# Patient Record
Sex: FEMALE | Race: BLACK OR AFRICAN AMERICAN | NOT HISPANIC OR LATINO | Employment: UNEMPLOYED | URBAN - METROPOLITAN AREA
[De-identification: names, ages, dates, MRNs, and addresses within clinical notes are randomized per-mention and may not be internally consistent; named-entity substitution may affect disease eponyms.]

---

## 2017-02-14 ENCOUNTER — HOSPITAL ENCOUNTER (OUTPATIENT)
Dept: ULTRASOUND IMAGING | Facility: HOSPITAL | Age: 47
Discharge: HOME | End: 2017-02-14
Payer: COMMERCIAL

## 2017-02-14 DIAGNOSIS — E04.1 NONTOXIC SINGLE THYROID NODULE: ICD-10-CM

## 2017-02-14 PROCEDURE — 76536 US EXAM OF HEAD AND NECK: CPT

## 2017-02-15 ENCOUNTER — ALLSCRIPTS OFFICE VISIT (OUTPATIENT)
Dept: OTHER | Facility: OTHER | Age: 47
End: 2017-02-15

## 2017-02-15 LAB — HCG, QUALITATIVE (HISTORICAL): NEGATIVE

## 2017-05-15 ENCOUNTER — ALLSCRIPTS OFFICE VISIT (OUTPATIENT)
Dept: OTHER | Facility: OTHER | Age: 47
End: 2017-05-15

## 2017-05-16 ENCOUNTER — ALLSCRIPTS OFFICE VISIT (OUTPATIENT)
Dept: OTHER | Facility: OTHER | Age: 47
End: 2017-05-16

## 2017-05-16 DIAGNOSIS — R42 DIZZINESS AND GIDDINESS: ICD-10-CM

## 2017-05-16 DIAGNOSIS — M51.36 OTHER INTERVERTEBRAL DISC DEGENERATION, LUMBAR REGION: ICD-10-CM

## 2017-05-16 DIAGNOSIS — E04.1 NONTOXIC SINGLE THYROID NODULE: ICD-10-CM

## 2018-01-10 NOTE — MISCELLANEOUS
Provider Comments  Provider Comments:   pt no show for tubal consult      Signatures   Electronically signed by : Ceci Alexandra, ; Jan 20 2016  3:30PM EST                       (Author)    Electronically signed by :  GRIS Barnes; Jan 25 2016  1:55PM EST                       (Acknowledgement)    Electronically signed by : Wolfgang Copeland MD; Jan 25 2016  1:55PM EST

## 2018-01-13 VITALS — DIASTOLIC BLOOD PRESSURE: 84 MMHG | WEIGHT: 145 LBS | BODY MASS INDEX: 26.52 KG/M2 | SYSTOLIC BLOOD PRESSURE: 150 MMHG

## 2018-01-13 VITALS
BODY MASS INDEX: 28.52 KG/M2 | WEIGHT: 155 LBS | HEIGHT: 62 IN | DIASTOLIC BLOOD PRESSURE: 78 MMHG | SYSTOLIC BLOOD PRESSURE: 112 MMHG

## 2018-01-13 NOTE — MISCELLANEOUS
Provider Comments  Provider Comments:   pt no show for annual      Signatures   Electronically signed by : Bassam Hargrove, ; Jan 18 2016 11:27AM EST                       (Author)    Electronically signed by :  GRIS Sheridan; Jan 25 2016  1:46PM EST                       (Acknowledgement)

## 2018-01-15 VITALS
TEMPERATURE: 96.3 F | HEART RATE: 68 BPM | DIASTOLIC BLOOD PRESSURE: 72 MMHG | BODY MASS INDEX: 28.19 KG/M2 | WEIGHT: 153.19 LBS | SYSTOLIC BLOOD PRESSURE: 114 MMHG | OXYGEN SATURATION: 100 % | HEIGHT: 62 IN | RESPIRATION RATE: 18 BRPM

## 2020-03-12 ENCOUNTER — LAB (OUTPATIENT)
Dept: LAB | Facility: HOSPITAL | Age: 50
End: 2020-03-12
Payer: COMMERCIAL

## 2020-03-12 ENCOUNTER — OFFICE VISIT (OUTPATIENT)
Dept: FAMILY MEDICINE CLINIC | Facility: CLINIC | Age: 50
End: 2020-03-12

## 2020-03-12 VITALS
DIASTOLIC BLOOD PRESSURE: 96 MMHG | WEIGHT: 174 LBS | BODY MASS INDEX: 32.85 KG/M2 | TEMPERATURE: 98 F | OXYGEN SATURATION: 97 % | SYSTOLIC BLOOD PRESSURE: 136 MMHG | HEART RATE: 63 BPM | RESPIRATION RATE: 16 BRPM | HEIGHT: 61 IN

## 2020-03-12 DIAGNOSIS — Z12.11 COLON CANCER SCREENING: ICD-10-CM

## 2020-03-12 DIAGNOSIS — Z87.828 HISTORY OF TORN MENISCUS OF LEFT KNEE: ICD-10-CM

## 2020-03-12 DIAGNOSIS — I10 ESSENTIAL HYPERTENSION: ICD-10-CM

## 2020-03-12 DIAGNOSIS — Z12.31 BREAST CANCER SCREENING BY MAMMOGRAM: Primary | ICD-10-CM

## 2020-03-12 DIAGNOSIS — G89.29 CHRONIC BILATERAL THORACIC BACK PAIN: ICD-10-CM

## 2020-03-12 DIAGNOSIS — M54.6 CHRONIC BILATERAL THORACIC BACK PAIN: ICD-10-CM

## 2020-03-12 DIAGNOSIS — F31.70 BIPOLAR AFFECTIVE DISORDER IN REMISSION (HCC): ICD-10-CM

## 2020-03-12 DIAGNOSIS — G89.29 CHRONIC PAIN OF LEFT KNEE: ICD-10-CM

## 2020-03-12 DIAGNOSIS — M51.36 LUMBAR DEGENERATIVE DISC DISEASE: ICD-10-CM

## 2020-03-12 DIAGNOSIS — Z72.0 TOBACCO USE: ICD-10-CM

## 2020-03-12 DIAGNOSIS — E55.9 VITAMIN D DEFICIENCY: ICD-10-CM

## 2020-03-12 DIAGNOSIS — M54.50 CHRONIC LOW BACK PAIN WITHOUT SCIATICA, UNSPECIFIED BACK PAIN LATERALITY: ICD-10-CM

## 2020-03-12 DIAGNOSIS — G89.29 CHRONIC LOW BACK PAIN WITHOUT SCIATICA, UNSPECIFIED BACK PAIN LATERALITY: ICD-10-CM

## 2020-03-12 DIAGNOSIS — Z11.3 ROUTINE SCREENING FOR STI (SEXUALLY TRANSMITTED INFECTION): ICD-10-CM

## 2020-03-12 DIAGNOSIS — E66.09 CLASS 1 OBESITY DUE TO EXCESS CALORIES WITH SERIOUS COMORBIDITY AND BODY MASS INDEX (BMI) OF 32.0 TO 32.9 IN ADULT: ICD-10-CM

## 2020-03-12 DIAGNOSIS — M17.0 PRIMARY OSTEOARTHRITIS OF BOTH KNEES: ICD-10-CM

## 2020-03-12 DIAGNOSIS — M25.562 CHRONIC PAIN OF LEFT KNEE: ICD-10-CM

## 2020-03-12 PROBLEM — F32.A ANXIETY AND DEPRESSION: Status: ACTIVE | Noted: 2018-04-26

## 2020-03-12 PROBLEM — F41.9 ANXIETY AND DEPRESSION: Status: ACTIVE | Noted: 2018-04-26

## 2020-03-12 LAB
25(OH)D3 SERPL-MCNC: 14.2 NG/ML (ref 30–100)
ALBUMIN SERPL BCP-MCNC: 3.9 G/DL (ref 3–5.2)
ALP SERPL-CCNC: 97 U/L (ref 43–122)
ALT SERPL W P-5'-P-CCNC: 11 U/L (ref 9–52)
ANION GAP SERPL CALCULATED.3IONS-SCNC: 4 MMOL/L (ref 5–14)
AST SERPL W P-5'-P-CCNC: 20 U/L (ref 14–36)
BASOPHILS # BLD AUTO: 0.1 THOUSANDS/ΜL (ref 0–0.1)
BASOPHILS NFR BLD AUTO: 3 % (ref 0–1)
BILIRUB SERPL-MCNC: 0.2 MG/DL
BUN SERPL-MCNC: 16 MG/DL (ref 5–25)
CALCIUM SERPL-MCNC: 9.2 MG/DL (ref 8.4–10.2)
CHLORIDE SERPL-SCNC: 108 MMOL/L (ref 97–108)
CO2 SERPL-SCNC: 27 MMOL/L (ref 22–30)
CREAT SERPL-MCNC: 0.95 MG/DL (ref 0.6–1.2)
EOSINOPHIL # BLD AUTO: 0.2 THOUSAND/ΜL (ref 0–0.4)
EOSINOPHIL NFR BLD AUTO: 4 % (ref 0–6)
ERYTHROCYTE [DISTWIDTH] IN BLOOD BY AUTOMATED COUNT: 15.3 %
GFR SERPL CREATININE-BSD FRML MDRD: 80 ML/MIN/1.73SQ M
GLUCOSE P FAST SERPL-MCNC: 83 MG/DL (ref 70–99)
HCT VFR BLD AUTO: 39.1 % (ref 36–46)
HCV AB SER QL: NORMAL
HGB BLD-MCNC: 13 G/DL (ref 12–16)
LYMPHOCYTES # BLD AUTO: 3.2 THOUSANDS/ΜL (ref 0.5–4)
LYMPHOCYTES NFR BLD AUTO: 55 % (ref 25–45)
MCH RBC QN AUTO: 29.7 PG (ref 26–34)
MCHC RBC AUTO-ENTMCNC: 33.2 G/DL (ref 31–36)
MCV RBC AUTO: 90 FL (ref 80–100)
MONOCYTES # BLD AUTO: 0.3 THOUSAND/ΜL (ref 0.2–0.9)
MONOCYTES NFR BLD AUTO: 6 % (ref 1–10)
NEUTROPHILS # BLD AUTO: 1.9 THOUSANDS/ΜL (ref 1.8–7.8)
NEUTS SEG NFR BLD AUTO: 33 % (ref 45–65)
PLATELET # BLD AUTO: 299 THOUSANDS/UL (ref 150–450)
PMV BLD AUTO: 9.1 FL (ref 8.9–12.7)
POTASSIUM SERPL-SCNC: 4.1 MMOL/L (ref 3.6–5)
PROT SERPL-MCNC: 7.3 G/DL (ref 5.9–8.4)
RBC # BLD AUTO: 4.36 MILLION/UL (ref 4–5.2)
SODIUM SERPL-SCNC: 139 MMOL/L (ref 137–147)
TSH SERPL DL<=0.05 MIU/L-ACNC: 1.65 UIU/ML (ref 0.47–4.68)
WBC # BLD AUTO: 5.9 THOUSAND/UL (ref 4.5–11)

## 2020-03-12 PROCEDURE — 85025 COMPLETE CBC W/AUTO DIFF WBC: CPT

## 2020-03-12 PROCEDURE — 82306 VITAMIN D 25 HYDROXY: CPT

## 2020-03-12 PROCEDURE — 3075F SYST BP GE 130 - 139MM HG: CPT | Performed by: PHYSICIAN ASSISTANT

## 2020-03-12 PROCEDURE — 84443 ASSAY THYROID STIM HORMONE: CPT

## 2020-03-12 PROCEDURE — 86803 HEPATITIS C AB TEST: CPT

## 2020-03-12 PROCEDURE — 4004F PT TOBACCO SCREEN RCVD TLK: CPT | Performed by: PHYSICIAN ASSISTANT

## 2020-03-12 PROCEDURE — 99214 OFFICE O/P EST MOD 30 MIN: CPT | Performed by: PHYSICIAN ASSISTANT

## 2020-03-12 PROCEDURE — 87389 HIV-1 AG W/HIV-1&-2 AB AG IA: CPT

## 2020-03-12 PROCEDURE — 80053 COMPREHEN METABOLIC PANEL: CPT

## 2020-03-12 PROCEDURE — 36415 COLL VENOUS BLD VENIPUNCTURE: CPT

## 2020-03-12 PROCEDURE — 3080F DIAST BP >= 90 MM HG: CPT | Performed by: PHYSICIAN ASSISTANT

## 2020-03-12 RX ORDER — NICOTINE 21 MG/24HR
1 PATCH, TRANSDERMAL 24 HOURS TRANSDERMAL EVERY 24 HOURS
Qty: 28 PATCH | Refills: 2 | Status: SHIPPED | OUTPATIENT
Start: 2020-03-12 | End: 2022-07-15 | Stop reason: ALTCHOICE

## 2020-03-12 RX ORDER — CLONIDINE 0.1 MG/24H
PATCH, EXTENDED RELEASE TRANSDERMAL
COMMUNITY
End: 2020-03-12 | Stop reason: SDUPTHER

## 2020-03-12 RX ORDER — CLONIDINE 0.1 MG/24H
1 PATCH, EXTENDED RELEASE TRANSDERMAL WEEKLY
Qty: 4 PATCH | Refills: 5 | Status: SHIPPED | OUTPATIENT
Start: 2020-03-12 | End: 2020-08-31

## 2020-03-12 RX ORDER — NICOTINE 21 MG/24HR
PATCH, TRANSDERMAL 24 HOURS TRANSDERMAL
COMMUNITY
Start: 2017-05-16 | End: 2020-03-12

## 2020-03-12 RX ORDER — PAROXETINE 10 MG/1
1 TABLET, FILM COATED ORAL DAILY
COMMUNITY
Start: 2017-02-15 | End: 2020-03-12

## 2020-03-12 RX ORDER — GABAPENTIN 600 MG/1
1200 TABLET ORAL 2 TIMES DAILY
COMMUNITY
Start: 2020-02-15 | End: 2021-04-02 | Stop reason: SDUPTHER

## 2020-03-12 RX ORDER — CYCLOBENZAPRINE HCL 10 MG
10 TABLET ORAL 3 TIMES DAILY PRN
Qty: 90 TABLET | Refills: 1 | Status: SHIPPED | OUTPATIENT
Start: 2020-03-12 | End: 2020-10-12

## 2020-03-12 RX ORDER — CYCLOBENZAPRINE HCL 10 MG
TABLET ORAL
COMMUNITY
Start: 2019-02-12 | End: 2020-03-12 | Stop reason: SDUPTHER

## 2020-03-12 NOTE — PROGRESS NOTES
Assessment/Plan:    B[facility] disorder ([facility] )  Continue seeing psych    HTN (hypertension)  Restart clonidine patch  She just stopped it a few days ago    Lumbar degenerative disc disease  She will continue flexeril and she needs to restart PT  Hs she never finished it in the past    Class 1 obesity due to excess calories with serious comorbidity and body mass index (BMI) of 32 0 to 32 9 in adult  BMI Counseling: Body mass index is 32 88 kg/m²  The BMI is above normal  Nutrition recommendations include reducing intake of saturated fat and trans fat and reducing intake of cholesterol  Tobacco use  Tobacco Cessation Counseling: Tobacco cessation counseling and education was provided  The patient is sincerely urged to quit consumption of tobacco  She is ready to quit tobacco  The numerous health risks of tobacco consumption were discussed  Prescribed the following medications: nicotine patch  Return in about 2 months (around 5/12/2020) for back   There are no Patient Instructions on file for this visit  Problem List Items Addressed This Visit        Cardiovascular and Mediastinum    HTN (hypertension)     Restart clonidine patch  She just stopped it a few days ago         Relevant Medications    cloNIDine (CATAPRES-TTS-1) 0 1 mg/24 hr    Other Relevant Orders    Comprehensive metabolic panel (Completed)       Musculoskeletal and Integument    Lumbar degenerative disc disease     She will continue flexeril and she needs to restart PT   Hs she never finished it in the past            Other    Bipolar disorder ([facility] )     Continue seeing psych         Relevant Medications    Lurasidone HCl (Latuda) 60 MG TABS    nicotine polacrilex (NICORETTE) 4 mg gum    nicotine (NICODERM CQ) 21 mg/24 hr TD 24 hr patch    Other Relevant Orders    CBC and differential (Completed)    TSH, 3rd generation (Completed)    Class 1 obesity due to excess calories with serious comorbidity and body mass index (BMI) of 32 0 to 32 9 in adult     BMI Counseling: Body mass index is 32 88 kg/m²  The BMI is above normal  Nutrition recommendations include reducing intake of saturated fat and trans fat and reducing intake of cholesterol  Tobacco use     Tobacco Cessation Counseling: Tobacco cessation counseling and education was provided  The patient is sincerely urged to quit consumption of tobacco  She is ready to quit tobacco  The numerous health risks of tobacco consumption were discussed  Prescribed the following medications: nicotine patch  Relevant Medications    nicotine (NICODERM CQ) 21 mg/24 hr TD 24 hr patch      Other Visit Diagnoses     Breast cancer screening by mammogram    -  Primary    Relevant Orders    Mammo screening bilateral w cad    Colon cancer screening        Relevant Orders    Ambulatory referral to Gastroenterology    Chronic pain of left knee        Relevant Orders    Ambulatory referral to Orthopedic Surgery    History of torn meniscus of left knee        Relevant Orders    Ambulatory referral to Orthopedic Surgery    Primary osteoarthritis of both knees        Relevant Medications    diclofenac sodium (VOLTAREN) 1 %    Chronic bilateral thoracic back pain        Relevant Medications    cyclobenzaprine (FLEXERIL) 10 mg tablet    Other Relevant Orders    Ambulatory referral to Physical Therapy    Chronic low back pain without sciatica, unspecified back pain laterality        Relevant Medications    cyclobenzaprine (FLEXERIL) 10 mg tablet    Other Relevant Orders    Ambulatory referral to Physical Therapy    Routine screening for STI (sexually transmitted infection)        Relevant Orders    Human Immunodeficiency Virus 1/2 Antigen / Antibody ( Fourth Generation) with Reflex Testing    Hepatitis C antibody (Completed)    Vitamin D deficiency        Relevant Orders    Comprehensive metabolic panel (Completed)    Vitamin D 25 hydroxy (Completed)            Subjective:     Bartolo Andrade is a 44 y o  female who  has a past medical history of Anxiety and PTSD (post-traumatic stress disorder)  who presented to the office today for follow up visit  Patient is transferring from [facility]  She had torn meniscus in left knee s/p repair in January 2019  She is still ahving a lot of pain  Her psychiatry referred her to get gel injections  She is not able to exercise to lose weigt because of this  Sometimes the left knee gives out  It does swell at times  she also still continues to get pain in her back  She is not having this for years  She did start doing physical therapy but then when she was found have a torn meniscus stop that  She was on Flexeril, gabapentin and tramadol on the past   The tramadol helped most   She has this for some time however  Pain is in lumbar and middle thoracic spine  It does not typically radiate  No bowel or bladder dysfunction  She is seeing psychiatry  She is seeing [facility]  She is doing well and is more motivated to get her life together  She wants to lose weight and quit smoking  She is doing gum and 14mcg patch   She wants to start 21mg patch again  The generic patch is always getting irritated on her arm and falling off  She does get pain in the feet  It is mostly on left foot  No tingling in the feet  No polyuria or polydipsia  She was told to get checked for diabetes  The following portions of the patient's history were reviewed and updated as appropriate:   She  has a past medical history of Anxiety and PTSD (post-traumatic stress disorder)    She   Patient Active Problem List    Diagnosis Date Noted    Class 1 obesity due to excess calories with serious comorbidity and body mass index (BMI) of 32 0 to 32 9 in adult 03/13/2020    Tobacco use 03/13/2020    HTN (hypertension) 02/12/2019    Anxiety and depression 04/26/2018    Low serum vitamin D 10/04/2015    B[facility] ([facility] ) 10/02/2015    Gastroesophageal reflux disease without esophagitis 10/02/2015    Lumbar degenerative disc disease 10/02/2015    Migraines 10/02/2015     She  has no past surgical history on file  Her family history is not on file  She  reports that she has been smoking  She has been smoking about 0 25 packs per day  She has never used smokeless tobacco  She reports that she has current or past drug history  Drug: Marijuana  She reports that she does not drink alcohol  Current Outpatient Medications   Medication Sig Dispense Refill    ALPRAZolam (XANAX) 1 mg tablet ALPRAZolam 1 MG Oral Tablet  Quantity: 120;  Refills: 0   , M D ;  Started 25-June-2015 Active      cloNIDine (CATAPRES-TTS-1) 0 1 mg/24 hr Place 1 patch (0 1 mg total) on the skin once a week 4 patch 5    cyclobenzaprine (FLEXERIL) 10 mg tablet Take 1 tablet (10 mg total) by mouth 3 (three) times a day as needed for muscle spasms 90 tablet 1    gabapentin (NEURONTIN) 600 MG tablet Take 1,200 mg by mouth 2 (two) times a day      nicotine polacrilex (NICORETTE) 4 mg gum       OMEPRAZOLE PO Omeprazole 20 MG Oral Capsule Delayed Release take 1 capsule by mouth once daily  Quantity: 30;  Refills: 1    Ewa Stevens PAC;  Started 2-Oct-2015 Active      traZODone (DESYREL) 100 mg tablet TraZODone HCl - 100 MG Oral Tablet  Quantity: 30;  Refills: 0   , M D ;  Started 17-Apr-2015 Active      diclofenac sodium (VOLTAREN) 1 % Apply 2 g topically 4 (four) times a day 100 g 5    Lurasidone HCl (Latuda) 60 MG TABS Latuda 60 mg tablet      nicotine (NICODERM CQ) 21 mg/24 hr TD 24 hr patch Place 1 patch on the skin every 24 hours 28 patch 2     No current facility-administered medications for this visit        Current Outpatient Medications on File Prior to Visit   Medication Sig    ALPRAZolam (XANAX) 1 mg tablet ALPRAZolam 1 MG Oral Tablet  Quantity: 120;  Refills: 0   , M D ;  Started 25-June-2015 Active    gabapentin (NEURONTIN) 600 MG tablet Take 1,200 mg by mouth 2 (two) times a day    nicotine polacrilex (NICORETTE) 4 mg gum     OMEPRAZOLE PO Omeprazole 20 MG Oral Capsule Delayed Release take 1 capsule by mouth once daily  Quantity: 30;  Refills: 1    Ewa Stevens PAC;  Started 2-Oct-2015 Active    traZODone (DESYREL) 100 mg tablet TraZODone HCl - 100 MG Oral Tablet  Quantity: 30;  Refills: 0   , M D ;  Started 17-Apr-2015 Active    Lurasidone HCl (Latuda) 60 MG TABS Latuda 60 mg tablet     No current facility-administered medications on file prior to visit  She has No Known Allergies       Current Outpatient Medications on File Prior to Visit   Medication Sig Dispense Refill    ALPRAZolam (XANAX) 1 mg tablet ALPRAZolam 1 MG Oral Tablet  Quantity: 120;  Refills: 0   , M D ;  Started 25-June-2015 Active      gabapentin (NEURONTIN) 600 MG tablet Take 1,200 mg by mouth 2 (two) times a day      nicotine polacrilex (NICORETTE) 4 mg gum       OMEPRAZOLE PO Omeprazole 20 MG Oral Capsule Delayed Release take 1 capsule by mouth once daily  Quantity: 30;  Refills: 1    VenancioarabellaEwa PAC;  Started 2-Oct-2015 Active      traZODone (DESYREL) 100 mg tablet TraZODone HCl - 100 MG Oral Tablet  Quantity: 30;  Refills: 0   , M D ;  Started 17-Apr-2015 Active      Lurasidone HCl (Latuda) 60 MG TABS Latuda 60 mg tablet       No current facility-administered medications on file prior to visit  Review of Systems   Constitutional: Negative for activity change, appetite change, chills, fatigue and unexpected weight change  HENT: Negative for dental problem, ear pain, hearing loss and sore throat  Eyes: Negative for visual disturbance  Respiratory: Negative for cough and wheezing  Cardiovascular: Negative for chest pain  Gastrointestinal: Negative for abdominal pain, constipation, diarrhea and vomiting  Genitourinary: Negative for difficulty urinating and dysuria  Musculoskeletal: Positive for arthralgias and back pain  Negative for myalgias  Skin: Negative for rash     Neurological: Negative for dizziness and headaches  Psychiatric/Behavioral: Negative for behavioral problems and dysphoric mood  The patient is not nervous/anxious  Objective:    /96 (BP Location: Left arm, Patient Position: Sitting, Cuff Size: Large)   Pulse 63   Temp 98 °F (36 7 °C) (Temporal)   Resp 16   Ht 5' 1" (1 549 m)   Wt 78 9 kg (174 lb)   SpO2 97%   BMI 32 88 kg/m²     Physical Exam   Constitutional: She is oriented to person, place, and time  She appears well-developed and well-nourished  No distress  HENT:   Head: Normocephalic and atraumatic  Right Ear: External ear normal    Left Ear: External ear normal    Eyes: Conjunctivae are normal    Neck: Normal range of motion  Neck supple  No thyromegaly present  Cardiovascular: Normal rate, regular rhythm and normal heart sounds  No murmur heard  Pulmonary/Chest: Effort normal and breath sounds normal  No respiratory distress  She has no wheezes  Abdominal: Soft  Bowel sounds are normal  She exhibits no distension and no mass  There is no tenderness  There is no rebound and no guarding  Musculoskeletal: She exhibits tenderness (lumbar L3-5 and T3-6)  Tender left medial knee and small amount of swelling   Lymphadenopathy:     She has no cervical adenopathy  Neurological: She is alert and oriented to person, place, and time  Psychiatric: She has a normal mood and affect  Her behavior is normal    Nursing note and vitals reviewed        Ewa Stevens PA-C  03/13/20  3:27 PM

## 2020-03-13 PROBLEM — E66.09 CLASS 1 OBESITY DUE TO EXCESS CALORIES WITH SERIOUS COMORBIDITY AND BODY MASS INDEX (BMI) OF 32.0 TO 32.9 IN ADULT: Status: ACTIVE | Noted: 2020-03-13

## 2020-03-13 PROBLEM — E66.811 CLASS 1 OBESITY DUE TO EXCESS CALORIES WITH SERIOUS COMORBIDITY AND BODY MASS INDEX (BMI) OF 32.0 TO 32.9 IN ADULT: Status: ACTIVE | Noted: 2020-03-13

## 2020-03-13 PROBLEM — Z72.0 TOBACCO USE: Status: ACTIVE | Noted: 2020-03-13

## 2020-03-13 LAB — HIV 1+2 AB+HIV1 P24 AG SERPL QL IA: NORMAL

## 2020-03-13 NOTE — ASSESSMENT & PLAN NOTE
BMI Counseling: Body mass index is 32 88 kg/m²  The BMI is above normal  Nutrition recommendations include reducing intake of saturated fat and trans fat and reducing intake of cholesterol

## 2020-03-17 DIAGNOSIS — E55.9 VITAMIN D DEFICIENCY: Primary | ICD-10-CM

## 2020-03-17 RX ORDER — ERGOCALCIFEROL 1.25 MG/1
50000 CAPSULE ORAL WEEKLY
Qty: 4 CAPSULE | Refills: 2 | Status: SHIPPED | OUTPATIENT
Start: 2020-03-17 | End: 2020-05-15

## 2020-03-18 ENCOUNTER — TELEPHONE (OUTPATIENT)
Dept: FAMILY MEDICINE CLINIC | Facility: CLINIC | Age: 50
End: 2020-03-18

## 2020-03-18 DIAGNOSIS — J30.9 ALLERGIC RHINITIS, UNSPECIFIED SEASONALITY, UNSPECIFIED TRIGGER: Primary | ICD-10-CM

## 2020-03-18 RX ORDER — FLUTICASONE PROPIONATE 50 MCG
2 SPRAY, SUSPENSION (ML) NASAL DAILY
Qty: 1 BOTTLE | Refills: 3 | Status: SHIPPED | OUTPATIENT
Start: 2020-03-18 | End: 2021-11-15

## 2020-03-25 ENCOUNTER — APPOINTMENT (OUTPATIENT)
Dept: RADIOLOGY | Facility: MEDICAL CENTER | Age: 50
End: 2020-03-25
Payer: COMMERCIAL

## 2020-03-25 ENCOUNTER — OFFICE VISIT (OUTPATIENT)
Dept: OBGYN CLINIC | Facility: MEDICAL CENTER | Age: 50
End: 2020-03-25
Payer: COMMERCIAL

## 2020-03-25 VITALS — TEMPERATURE: 97.8 F | WEIGHT: 174 LBS | HEIGHT: 61 IN | BODY MASS INDEX: 32.85 KG/M2

## 2020-03-25 DIAGNOSIS — M25.561 ACUTE PAIN OF BOTH KNEES: ICD-10-CM

## 2020-03-25 DIAGNOSIS — M17.12 PRIMARY OSTEOARTHRITIS OF LEFT KNEE: Primary | ICD-10-CM

## 2020-03-25 DIAGNOSIS — M25.562 ACUTE PAIN OF BOTH KNEES: ICD-10-CM

## 2020-03-25 DIAGNOSIS — M17.0 PRIMARY OSTEOARTHRITIS OF BOTH KNEES: ICD-10-CM

## 2020-03-25 PROCEDURE — 99243 OFF/OP CNSLTJ NEW/EST LOW 30: CPT | Performed by: ORTHOPAEDIC SURGERY

## 2020-03-25 PROCEDURE — 73564 X-RAY EXAM KNEE 4 OR MORE: CPT

## 2020-03-25 NOTE — PATIENT INSTRUCTIONS
Plan :  1  I explained the natural history of the  problem to the  patient - there is no surgical intervention necessary and should improve with nonoperative treatment  2  Activity modification - avoid hyperflexion like bending, kneeling, squatting,  stairs as  much as humanly possible  Change position frequently to straighten your leg if you are sitting for a long period of time  3  Pain should be the warning guide to your activities - both during and after exercises  Stop doing your sport or activity if you are getting significant pain  4  Ice in large trash bag or bag of frozen peas for 15 min 4x a day, if needed, for pain or swelling  Heat is not indicated  5  Take Advil, Aleve, or Tylenol on an as-needed basis for pain  6  Do the isometric quad and hamstring exercises that you were shown diligently at  home  7  Gliding activities (skiing machine, elliptical, swimming) are allowed, but do not do pounding activities  ( treadmill, aerobics, distance walking )  8  A cane in the right hand will help decrease some of the pain in the left knee   9  We will try to obtain preauthorization for viscosupplementation to the more symptomatic left knee

## 2020-03-25 NOTE — PROGRESS NOTES
Chief Complaint   Patient presents with    Left Knee - Pain           Assessment:  DJD of knees bilateral-left greater than right    Plan :  1  I explained the natural history of the  problem to the  patient - there is no surgical intervention necessary and should improve with nonoperative treatment  2  Activity modification - avoid hyperflexion like bending, kneeling, squatting,  stairs as  much as humanly possible  Change position frequently to straighten your leg if you are sitting for a long period of time  3  Pain should be the warning guide to your activities - both during and after exercises  Stop doing your sport or activity if you are getting significant pain  4  Ice in large trash bag or bag of frozen peas for 15 min 4x a day, if needed, for pain or swelling  Heat is not indicated  5  Take Advil, Aleve, or Tylenol on an as-needed basis for pain  6  Do the isometric quad and hamstring exercises that you were shown diligently at  home  7  Gliding activities (skiing machine, elliptical, swimming) are allowed, but do not do pounding activities  ( treadmill, aerobics, distance walking )  8  A cane in the right hand will help decrease some of the pain in the left knee   9  We will try to obtain preauthorization for viscosupplementation to the more symptomatic left knee  HPI:   Patient is a 64-year-old [place] American female who presents today with chief complaint of bilateral knee pain, left more significant than right  She comes for 2nd opinion and on consultation from her family doctor  On today's presentation she complains of anterior and medial knee pain that is exacerbated by damp weather, going up and down stairs, kneeling, squatting, and prolonged weight-bearing activity  She reports intermittent swelling, but denies any bruising, numbness, tingling, or mechanical symptoms    She also complains of a occasional feelings of instability, and she states that her left knee has given out on her in the past   She admits a previous surgical history that includes arthroscopic meniscectomy performed in the left knee on 1/15/2019  Following that procedure, she did have formal dry land physical therapy which she said did not help her symptoms  She had a course of aquatic therapy, with which she says she did feel improvement  She has also had cortisone injections which were not helpful, and discussed viscosupplementation injections with her previous doctor  Unfortunately, before moving forward with gel shots, she had a disagreement with her surgeon and no longer wishes to be treated under his practice  PMHx:         Past Medical History:   Diagnosis Date    Anxiety     PTSD (post-traumatic stress disorder)        History reviewed  No pertinent surgical history  History reviewed  No pertinent family history      Social History     Socioeconomic History    Marital status: Single     Spouse name: Not on file    Number of children: Not on file    Years of education: Not on file    Highest education level: Not on file   Occupational History    Not on file   Social Needs    Financial resource strain: Not on file    Food insecurity:     Worry: Not on file     Inability: Not on file    Transportation needs:     Medical: Not on file     Non-medical: Not on file   Tobacco Use    Smoking status: Light Tobacco Smoker     Packs/day: 0 25    Smokeless tobacco: Never Used   Substance and Sexual Activity    Alcohol use: No    Drug use: Yes     Types: Marijuana    Sexual activity: Not on file   Lifestyle    Physical activity:     Days per week: Not on file     Minutes per session: Not on file    Stress: Not on file   Relationships    Social connections:     Talks on phone: Not on file     Gets together: Not on file     Attends Buddhist service: Not on file     Active member of club or organization: Not on file     Attends meetings of clubs or organizations: Not on file     Relationship status: Not on file    Intimate partner violence:     Fear of current or ex partner: Not on file     Emotionally abused: Not on file     Physically abused: Not on file     Forced sexual activity: Not on file   Other Topics Concern    Not on file   Social History Narrative    Not on file       Current Outpatient Medications   Medication Sig Dispense Refill    ALPRAZolam (XANAX) 1 mg tablet ALPRAZolam 1 MG Oral Tablet  Quantity: 120;  Refills: 0   , M D ;  Started 25-June-2015 Active      cloNIDine (CATAPRES-TTS-1) 0 1 mg/24 hr Place 1 patch (0 1 mg total) on the skin once a week 4 patch 5    cyclobenzaprine (FLEXERIL) 10 mg tablet Take 1 tablet (10 mg total) by mouth 3 (three) times a day as needed for muscle spasms 90 tablet 1    diclofenac sodium (VOLTAREN) 1 % Apply 2 g topically 4 (four) times a day 100 g 5    ergocalciferol (VITAMIN D2) 50,000 units Take 1 capsule (50,000 Units total) by mouth once a week 4 capsule 2    fluticasone (FLONASE) 50 mcg/act nasal spray 2 sprays into each nostril daily 1 Bottle 3    gabapentin (NEURONTIN) 600 MG tablet Take 1,200 mg by mouth 2 (two) times a day      Lurasidone HCl (Latuda) 60 MG TABS Latuda 60 mg tablet      nicotine (NICODERM CQ) 21 mg/24 hr TD 24 hr patch Place 1 patch on the skin every 24 hours 28 patch 2    nicotine polacrilex (NICORETTE) 4 mg gum       OMEPRAZOLE PO Omeprazole 20 MG Oral Capsule Delayed Release take 1 capsule by mouth once daily  Quantity: 30;  Refills: 1    Ewa Stevens PAC;  Started 2-Oct-2015 Active      traZODone (DESYREL) 100 mg tablet TraZODone HCl - 100 MG Oral Tablet  Quantity: 30;  Refills: 0   , M D ;  Started 17-Apr-2015 Active       No current facility-administered medications for this visit  Allergies: Patient has no known allergies  ROS:  Positive for musculoskeletal complaints as noted above  The remaining 11/12 systems on the intake sheet that I reviewed were negative      PE:  Temp 97 8 °F (36 6 °C)   Ht 5' 1" (1 549 m)   Wt 78 9 kg (174 lb)   BMI 32 88 kg/m²   Constitutional: The patient was  oriented to person, place, and time  Well-developed and well-nourished  In no acute distress  HEENT: Vision intact  Hearing normal  Swallowing normal   Head: Normocephalic  Cardiovascular: Intact distal pulses  Pulse regular  Pulmonary/Chest: Effort normal  No respiratory distress  Neurological: Alert and oriented to person, place, and time  Skin: Skin is warm  Psychiatric: Normal mood and affect  Ortho Exam:    Patient ambulates with mild observable limp favoring the right lower extremity  Physical examination of the bilateral knees show mild varus deformity in both weight-bearing and nonweightbearing postures  Skin is warm and dry to touch with no signs of erythema, ecchymosis, or infection  The left knee has well-healed surgical scars consistent with previous medical history of arthroscopic meniscectomy  She demonstrates active knee ROM 5°-100° on the left lower extremity as compared to 0°-120° on the right lower extremity  She has tenderness to palpation over the medial joint line and pes anserine bursae bilaterally  Both knees are stable to varus, valgus, anterior, and posterior stress  There is no popliteal adenopathy noted bilaterally  Negative calf squeeze bilaterally  2+ TP pulse  Sensation light touch intact distally  Studies reviewed:  I personally reviewed weight-bearing x-rays of both knees  She has medial compartment narrowing much more severe in the left knee than the right with sclerosis and some flattening  She has some early osteophyte formation of the patellofemoral views with preservation of the joint space in the anterior compartment  The lateral compartment is also preserved    There were no loose bodies or lytic areas seen    Scribe Attestation    I,:   Jazmine Woo am acting as a scribe while in the presence of the attending physician :        I,:   Luis Carlos Dale MD personally performed the services described in this documentation    as scribed in my presence :

## 2020-03-25 NOTE — LETTER
March 25, 2020     Ewa Stevens PA-C  [address]  [address] [facility]    Patient: Bayron Will   YOB: 1970   Date of Visit: 3/25/2020       Dear Ms Stevens: Thank you for referring Bayron Will to me for evaluation  Below are my notes for this consultation  If you have questions, please do not hesitate to call me  I look forward to following your patient along with you  Sincerely,        Cayla Vazquez MD        CC: No Recipients  Cayla Vazquez MD  3/25/2020 12:33 PM  Sign at close encounter  Chief Complaint   Patient presents with    Left Knee - Pain           Assessment:  DJD of knees bilateral-left greater than right    Plan :  1  I explained the natural history of the  problem to the  patient - there is no surgical intervention necessary and should improve with nonoperative treatment  2  Activity modification - avoid hyperflexion like bending, kneeling, squatting,  stairs as  much as humanly possible  Change position frequently to straighten your leg if you are sitting for a long period of time  3  Pain should be the warning guide to your activities - both during and after exercises  Stop doing your sport or activity if you are getting significant pain  4  Ice in large trash bag or bag of frozen peas for 15 min 4x a day, if needed, for pain or swelling  Heat is not indicated  5  Take Advil, Aleve, or Tylenol on an as-needed basis for pain  6  Do the isometric quad and hamstring exercises that you were shown diligently at  home  7  Gliding activities (skiing machine, elliptical, swimming) are allowed, but do not do pounding activities  ( treadmill, aerobics, distance walking )  8  A cane in the right hand will help decrease some of the pain in the left knee   9  We will try to obtain preauthorization for viscosupplementation to the more symptomatic left knee      HPI:   Patient is a 66-year-old [place] American female who presents today with chief complaint of bilateral knee pain, left more significant than right  She comes for 2nd opinion and on consultation from her family doctor  On today's presentation she complains of anterior and medial knee pain that is exacerbated by damp weather, going up and down stairs, kneeling, squatting, and prolonged weight-bearing activity  She reports intermittent swelling, but denies any bruising, numbness, tingling, or mechanical symptoms  She also complains of a occasional feelings of instability, and she states that her left knee has given out on her in the past   She admits a previous surgical history that includes arthroscopic meniscectomy performed in the left knee on 1/15/2019  Following that procedure, she did have formal dry land physical therapy which she said did not help her symptoms  She had a course of aquatic therapy, with which she says she did feel improvement  She has also had cortisone injections which were not helpful, and discussed viscosupplementation injections with her previous doctor  Unfortunately, before moving forward with gel shots, she had a disagreement with her surgeon and no longer wishes to be treated under his practice  PMHx:         Past Medical History:   Diagnosis Date    Anxiety     PTSD (post-traumatic stress disorder)        History reviewed  No pertinent surgical history  History reviewed  No pertinent family history      Social History     Socioeconomic History    Marital status: Single     Spouse name: Not on file    Number of children: Not on file    Years of education: Not on file    Highest education level: Not on file   Occupational History    Not on file   Social Needs    Financial resource strain: Not on file    Food insecurity:     Worry: Not on file     Inability: Not on file    Transportation needs:     Medical: Not on file     Non-medical: Not on file   Tobacco Use    Smoking status: Light Tobacco Smoker     Packs/day: 0 25    Smokeless tobacco: Never Used Substance and Sexual Activity    Alcohol use: No    Drug use: Yes     Types: Marijuana    Sexual activity: Not on file   Lifestyle    Physical activity:     Days per week: Not on file     Minutes per session: Not on file    Stress: Not on file   Relationships    Social connections:     Talks on phone: Not on file     Gets together: Not on file     Attends Buddhism service: Not on file     Active member of club or organization: Not on file     Attends meetings of clubs or organizations: Not on file     Relationship status: Not on file    Intimate partner violence:     Fear of current or ex partner: Not on file     Emotionally abused: Not on file     Physically abused: Not on file     Forced sexual activity: Not on file   Other Topics Concern    Not on file   Social History Narrative    Not on file       Current Outpatient Medications   Medication Sig Dispense Refill    ALPRAZolam (XANAX) 1 mg tablet ALPRAZolam 1 MG Oral Tablet  Quantity: 120;  Refills: 0   , M D ;  Started 25-June-2015 Active      cloNIDine (CATAPRES-TTS-1) 0 1 mg/24 hr Place 1 patch (0 1 mg total) on the skin once a week 4 patch 5    cyclobenzaprine (FLEXERIL) 10 mg tablet Take 1 tablet (10 mg total) by mouth 3 (three) times a day as needed for muscle spasms 90 tablet 1    diclofenac sodium (VOLTAREN) 1 % Apply 2 g topically 4 (four) times a day 100 g 5    ergocalciferol (VITAMIN D2) 50,000 units Take 1 capsule (50,000 Units total) by mouth once a week 4 capsule 2    fluticasone (FLONASE) 50 mcg/act nasal spray 2 sprays into each nostril daily 1 Bottle 3    gabapentin (NEURONTIN) 600 MG tablet Take 1,200 mg by mouth 2 (two) times a day      Lurasidone HCl (Latuda) 60 MG TABS Latuda 60 mg tablet      nicotine (NICODERM CQ) 21 mg/24 hr TD 24 hr patch Place 1 patch on the skin every 24 hours 28 patch 2    nicotine polacrilex (NICORETTE) 4 mg gum       OMEPRAZOLE PO Omeprazole 20 MG Oral Capsule Delayed Release take 1 capsule by mouth once daily  Quantity: 30;  Refills: 1    Ewa Stevens PAC;  Started 2-Oct-2015 Active      traZODone (DESYREL) 100 mg tablet TraZODone HCl - 100 MG Oral Tablet  Quantity: 30;  Refills: 0   , M D ;  Started 17-Apr-2015 Active       No current facility-administered medications for this visit  Allergies: Patient has no known allergies  ROS:  Positive for musculoskeletal complaints as noted above  The remaining 11/12 systems on the intake sheet that I reviewed were negative  PE:  Temp 97 8 °F (36 6 °C)   Ht 5' 1" (1 549 m)   Wt 78 9 kg (174 lb)   BMI 32 88 kg/m²    Constitutional: The patient was  oriented to person, place, and time  Well-developed and well-nourished  In no acute distress  HEENT: Vision intact  Hearing normal  Swallowing normal   Head: Normocephalic  Cardiovascular: Intact distal pulses  Pulse regular  Pulmonary/Chest: Effort normal  No respiratory distress  Neurological: Alert and oriented to person, place, and time  Skin: Skin is warm  Psychiatric: Normal mood and affect  Ortho Exam:    Patient ambulates with mild observable limp favoring the right lower extremity  Physical examination of the bilateral knees show mild varus deformity in both weight-bearing and nonweightbearing postures  Skin is warm and dry to touch with no signs of erythema, ecchymosis, or infection  The left knee has well-healed surgical scars consistent with previous medical history of arthroscopic meniscectomy  She demonstrates active knee ROM 5°-100° on the left lower extremity as compared to 0°-120° on the right lower extremity  She has tenderness to palpation over the medial joint line and pes anserine bursae bilaterally  Both knees are stable to varus, valgus, anterior, and posterior stress  There is no popliteal adenopathy noted bilaterally  Negative calf squeeze bilaterally  2+ TP pulse  Sensation light touch intact distally      Studies reviewed:  I personally reviewed weight-bearing x-rays of both knees  She has medial compartment narrowing much more severe in the left knee than the right with sclerosis and some flattening  She has some early osteophyte formation of the patellofemoral views with preservation of the joint space in the anterior compartment  The lateral compartment is also preserved    There were no loose bodies or lytic areas seen    Scribe Attestation    I,:   Gillian Prakash am acting as a scribe while in the presence of the attending physician :        I,:   Minor Montero MD personally performed the services described in this documentation    as scribed in my presence :

## 2020-04-14 ENCOUNTER — TELEPHONE (OUTPATIENT)
Dept: FAMILY MEDICINE CLINIC | Facility: CLINIC | Age: 50
End: 2020-04-14

## 2020-04-14 DIAGNOSIS — M51.36 LUMBAR DEGENERATIVE DISC DISEASE: ICD-10-CM

## 2020-04-14 DIAGNOSIS — K21.9 GASTROESOPHAGEAL REFLUX DISEASE WITHOUT ESOPHAGITIS: Primary | ICD-10-CM

## 2020-04-14 RX ORDER — CELECOXIB 200 MG/1
200 CAPSULE ORAL DAILY
Qty: 30 CAPSULE | Refills: 2 | Status: SHIPPED | OUTPATIENT
Start: 2020-04-14 | End: 2020-05-27 | Stop reason: SDUPTHER

## 2020-04-14 RX ORDER — OMEPRAZOLE 20 MG/1
20 CAPSULE, DELAYED RELEASE ORAL DAILY
Qty: 90 CAPSULE | Refills: 1 | Status: SHIPPED | OUTPATIENT
Start: 2020-04-14 | End: 2020-05-27 | Stop reason: SDUPTHER

## 2020-04-20 ENCOUNTER — EVALUATION (OUTPATIENT)
Dept: PHYSICAL THERAPY | Facility: MEDICAL CENTER | Age: 50
End: 2020-04-20
Payer: COMMERCIAL

## 2020-04-20 DIAGNOSIS — M54.50 LUMBAR PAIN: ICD-10-CM

## 2020-04-20 DIAGNOSIS — G89.29 CHRONIC BILATERAL THORACIC BACK PAIN: Primary | ICD-10-CM

## 2020-04-20 DIAGNOSIS — M54.6 CHRONIC BILATERAL THORACIC BACK PAIN: Primary | ICD-10-CM

## 2020-04-20 PROCEDURE — 97161 PT EVAL LOW COMPLEX 20 MIN: CPT | Performed by: PHYSICAL THERAPIST

## 2020-04-27 ENCOUNTER — OFFICE VISIT (OUTPATIENT)
Dept: PHYSICAL THERAPY | Facility: MEDICAL CENTER | Age: 50
End: 2020-04-27
Payer: COMMERCIAL

## 2020-04-27 ENCOUNTER — TELEPHONE (OUTPATIENT)
Dept: PAIN MEDICINE | Facility: MEDICAL CENTER | Age: 50
End: 2020-04-27

## 2020-04-27 DIAGNOSIS — M54.50 LUMBAR PAIN: ICD-10-CM

## 2020-04-27 DIAGNOSIS — M54.6 CHRONIC BILATERAL THORACIC BACK PAIN: Primary | ICD-10-CM

## 2020-04-27 DIAGNOSIS — G89.29 CHRONIC BILATERAL THORACIC BACK PAIN: Primary | ICD-10-CM

## 2020-04-27 PROCEDURE — 97112 NEUROMUSCULAR REEDUCATION: CPT | Performed by: PHYSICAL THERAPIST

## 2020-04-27 PROCEDURE — 97140 MANUAL THERAPY 1/> REGIONS: CPT | Performed by: PHYSICAL THERAPIST

## 2020-04-27 PROCEDURE — 97110 THERAPEUTIC EXERCISES: CPT | Performed by: PHYSICAL THERAPIST

## 2020-05-04 ENCOUNTER — OFFICE VISIT (OUTPATIENT)
Dept: PHYSICAL THERAPY | Facility: MEDICAL CENTER | Age: 50
End: 2020-05-04
Payer: COMMERCIAL

## 2020-05-04 DIAGNOSIS — G89.29 CHRONIC BILATERAL THORACIC BACK PAIN: Primary | ICD-10-CM

## 2020-05-04 DIAGNOSIS — M54.50 LUMBAR PAIN: ICD-10-CM

## 2020-05-04 DIAGNOSIS — M54.6 CHRONIC BILATERAL THORACIC BACK PAIN: Primary | ICD-10-CM

## 2020-05-04 PROCEDURE — 97112 NEUROMUSCULAR REEDUCATION: CPT | Performed by: PHYSICAL THERAPIST

## 2020-05-04 PROCEDURE — 97110 THERAPEUTIC EXERCISES: CPT | Performed by: PHYSICAL THERAPIST

## 2020-05-04 PROCEDURE — 97140 MANUAL THERAPY 1/> REGIONS: CPT | Performed by: PHYSICAL THERAPIST

## 2020-05-11 ENCOUNTER — HOSPITAL ENCOUNTER (OUTPATIENT)
Dept: MAMMOGRAPHY | Facility: CLINIC | Age: 50
Discharge: HOME | End: 2020-05-11
Payer: COMMERCIAL

## 2020-05-11 ENCOUNTER — APPOINTMENT (OUTPATIENT)
Dept: PHYSICAL THERAPY | Facility: MEDICAL CENTER | Age: 50
End: 2020-05-11
Payer: COMMERCIAL

## 2020-05-11 VITALS — BODY MASS INDEX: 32.85 KG/M2 | HEIGHT: 61 IN | WEIGHT: 174 LBS

## 2020-05-11 DIAGNOSIS — Z12.31 BREAST CANCER SCREENING BY MAMMOGRAM: ICD-10-CM

## 2020-05-11 PROCEDURE — 77067 SCR MAMMO BI INCL CAD: CPT

## 2020-05-11 PROCEDURE — 77063 BREAST TOMOSYNTHESIS BI: CPT

## 2020-05-14 DIAGNOSIS — E55.9 VITAMIN D DEFICIENCY: ICD-10-CM

## 2020-05-15 RX ORDER — ERGOCALCIFEROL 1.25 MG/1
CAPSULE ORAL
Qty: 4 CAPSULE | Refills: 2 | Status: SHIPPED | OUTPATIENT
Start: 2020-05-15 | End: 2020-06-05

## 2020-05-18 ENCOUNTER — OFFICE VISIT (OUTPATIENT)
Dept: GASTROENTEROLOGY | Facility: MEDICAL CENTER | Age: 50
End: 2020-05-18
Payer: COMMERCIAL

## 2020-05-18 ENCOUNTER — TELEPHONE (OUTPATIENT)
Dept: OTHER | Facility: OTHER | Age: 50
End: 2020-05-18

## 2020-05-18 DIAGNOSIS — K21.9 GASTROESOPHAGEAL REFLUX DISEASE WITHOUT ESOPHAGITIS: Primary | ICD-10-CM

## 2020-05-18 DIAGNOSIS — Z12.11 COLON CANCER SCREENING: ICD-10-CM

## 2020-05-18 DIAGNOSIS — Z12.11 ENCOUNTER FOR SCREENING COLONOSCOPY: ICD-10-CM

## 2020-05-18 PROCEDURE — 99202 OFFICE O/P NEW SF 15 MIN: CPT | Performed by: PHYSICIAN ASSISTANT

## 2020-05-18 RX ORDER — POLYETHYLENE GLYCOL 3350 17 G/17G
POWDER, FOR SOLUTION ORAL
Qty: 238 G | Refills: 0 | Status: SHIPPED | OUTPATIENT
Start: 2020-05-18 | End: 2020-06-09 | Stop reason: ALTCHOICE

## 2020-05-26 ENCOUNTER — TELEPHONE (OUTPATIENT)
Dept: GASTROENTEROLOGY | Facility: MEDICAL CENTER | Age: 50
End: 2020-05-26

## 2020-05-27 ENCOUNTER — TELEMEDICINE (OUTPATIENT)
Dept: FAMILY MEDICINE CLINIC | Facility: CLINIC | Age: 50
End: 2020-05-27

## 2020-05-27 VITALS — BODY MASS INDEX: 33.99 KG/M2 | WEIGHT: 180 LBS | HEIGHT: 61 IN

## 2020-05-27 DIAGNOSIS — K21.9 GASTROESOPHAGEAL REFLUX DISEASE WITHOUT ESOPHAGITIS: ICD-10-CM

## 2020-05-27 DIAGNOSIS — M51.36 LUMBAR DEGENERATIVE DISC DISEASE: ICD-10-CM

## 2020-05-27 DIAGNOSIS — E66.09 CLASS 1 OBESITY DUE TO EXCESS CALORIES WITH SERIOUS COMORBIDITY AND BODY MASS INDEX (BMI) OF 32.0 TO 32.9 IN ADULT: Primary | ICD-10-CM

## 2020-05-27 DIAGNOSIS — I10 ESSENTIAL HYPERTENSION: ICD-10-CM

## 2020-05-27 PROCEDURE — 99214 OFFICE O/P EST MOD 30 MIN: CPT | Performed by: PHYSICIAN ASSISTANT

## 2020-05-27 RX ORDER — OMEPRAZOLE 20 MG/1
20 CAPSULE, DELAYED RELEASE ORAL DAILY
Qty: 90 CAPSULE | Refills: 1 | Status: SHIPPED | OUTPATIENT
Start: 2020-05-27 | End: 2021-11-15

## 2020-05-27 RX ORDER — CELECOXIB 200 MG/1
200 CAPSULE ORAL DAILY
Qty: 30 CAPSULE | Refills: 2 | Status: SHIPPED | OUTPATIENT
Start: 2020-05-27 | End: 2020-09-02

## 2020-05-29 ENCOUNTER — ANESTHESIA EVENT (OUTPATIENT)
Dept: GASTROENTEROLOGY | Facility: MEDICAL CENTER | Age: 50
End: 2020-05-29

## 2020-06-01 ENCOUNTER — OFFICE VISIT (OUTPATIENT)
Dept: PHYSICAL THERAPY | Facility: MEDICAL CENTER | Age: 50
End: 2020-06-01
Payer: COMMERCIAL

## 2020-06-01 DIAGNOSIS — M54.50 LUMBAR PAIN: ICD-10-CM

## 2020-06-01 DIAGNOSIS — G89.29 CHRONIC BILATERAL THORACIC BACK PAIN: Primary | ICD-10-CM

## 2020-06-01 DIAGNOSIS — M54.6 CHRONIC BILATERAL THORACIC BACK PAIN: Primary | ICD-10-CM

## 2020-06-01 PROCEDURE — 97140 MANUAL THERAPY 1/> REGIONS: CPT | Performed by: PHYSICAL THERAPIST

## 2020-06-02 ENCOUNTER — TELEPHONE (OUTPATIENT)
Dept: OBGYN CLINIC | Facility: CLINIC | Age: 50
End: 2020-06-02

## 2020-06-03 ENCOUNTER — OFFICE VISIT (OUTPATIENT)
Dept: OBGYN CLINIC | Facility: CLINIC | Age: 50
End: 2020-06-03

## 2020-06-03 VITALS
SYSTOLIC BLOOD PRESSURE: 120 MMHG | TEMPERATURE: 97 F | HEIGHT: 61 IN | BODY MASS INDEX: 32.47 KG/M2 | HEART RATE: 70 BPM | WEIGHT: 172 LBS | DIASTOLIC BLOOD PRESSURE: 80 MMHG

## 2020-06-03 DIAGNOSIS — Z01.419 ENCOUNTER FOR ANNUAL ROUTINE GYNECOLOGICAL EXAMINATION: Primary | ICD-10-CM

## 2020-06-03 DIAGNOSIS — Z12.11 ENCOUNTER FOR SCREENING COLONOSCOPY: ICD-10-CM

## 2020-06-03 PROCEDURE — 99396 PREV VISIT EST AGE 40-64: CPT | Performed by: NURSE PRACTITIONER

## 2020-06-03 PROCEDURE — U0003 INFECTIOUS AGENT DETECTION BY NUCLEIC ACID (DNA OR RNA); SEVERE ACUTE RESPIRATORY SYNDROME CORONAVIRUS 2 (SARS-COV-2) (CORONAVIRUS DISEASE [COVID-19]), AMPLIFIED PROBE TECHNIQUE, MAKING USE OF HIGH THROUGHPUT TECHNOLOGIES AS DESCRIBED BY CMS-2020-01-R: HCPCS

## 2020-06-04 DIAGNOSIS — E55.9 VITAMIN D DEFICIENCY: ICD-10-CM

## 2020-06-05 LAB — SARS-COV-2 RNA SPEC QL NAA+PROBE: NOT DETECTED

## 2020-06-05 RX ORDER — ERGOCALCIFEROL 1.25 MG/1
CAPSULE ORAL
Qty: 4 CAPSULE | Refills: 2 | Status: SHIPPED | OUTPATIENT
Start: 2020-06-05 | End: 2020-12-09

## 2020-06-08 ENCOUNTER — OFFICE VISIT (OUTPATIENT)
Dept: PHYSICAL THERAPY | Facility: MEDICAL CENTER | Age: 50
End: 2020-06-08
Payer: COMMERCIAL

## 2020-06-08 DIAGNOSIS — G89.29 CHRONIC BILATERAL THORACIC BACK PAIN: Primary | ICD-10-CM

## 2020-06-08 DIAGNOSIS — M54.50 LUMBAR PAIN: ICD-10-CM

## 2020-06-08 DIAGNOSIS — M54.6 CHRONIC BILATERAL THORACIC BACK PAIN: Primary | ICD-10-CM

## 2020-06-08 PROCEDURE — 97140 MANUAL THERAPY 1/> REGIONS: CPT | Performed by: PHYSICAL THERAPIST

## 2020-06-08 PROCEDURE — 97112 NEUROMUSCULAR REEDUCATION: CPT | Performed by: PHYSICAL THERAPIST

## 2020-06-08 PROCEDURE — 97110 THERAPEUTIC EXERCISES: CPT | Performed by: PHYSICAL THERAPIST

## 2020-06-09 ENCOUNTER — TELEPHONE (OUTPATIENT)
Dept: OBGYN CLINIC | Facility: MEDICAL CENTER | Age: 50
End: 2020-06-09

## 2020-06-09 ENCOUNTER — HOSPITAL ENCOUNTER (OUTPATIENT)
Dept: GASTROENTEROLOGY | Facility: MEDICAL CENTER | Age: 50
Setting detail: OUTPATIENT SURGERY
Discharge: HOME | End: 2020-06-09
Admitting: INTERNAL MEDICINE
Payer: COMMERCIAL

## 2020-06-09 ENCOUNTER — ANESTHESIA (OUTPATIENT)
Dept: GASTROENTEROLOGY | Facility: MEDICAL CENTER | Age: 50
End: 2020-06-09

## 2020-06-09 VITALS
DIASTOLIC BLOOD PRESSURE: 83 MMHG | SYSTOLIC BLOOD PRESSURE: 136 MMHG | BODY MASS INDEX: 32.47 KG/M2 | TEMPERATURE: 98.4 F | OXYGEN SATURATION: 98 % | HEIGHT: 61 IN | RESPIRATION RATE: 16 BRPM | WEIGHT: 172 LBS | HEART RATE: 73 BPM

## 2020-06-09 DIAGNOSIS — K21.9 GASTROESOPHAGEAL REFLUX DISEASE WITHOUT ESOPHAGITIS: ICD-10-CM

## 2020-06-09 DIAGNOSIS — Z12.11 ENCOUNTER FOR SCREENING COLONOSCOPY: ICD-10-CM

## 2020-06-09 PROCEDURE — 43239 EGD BIOPSY SINGLE/MULTIPLE: CPT | Performed by: INTERNAL MEDICINE

## 2020-06-09 PROCEDURE — 88305 TISSUE EXAM BY PATHOLOGIST: CPT | Performed by: PATHOLOGY

## 2020-06-09 PROCEDURE — 45380 COLONOSCOPY AND BIOPSY: CPT | Performed by: INTERNAL MEDICINE

## 2020-06-09 RX ORDER — SODIUM CHLORIDE 9 MG/ML
125 INJECTION, SOLUTION INTRAVENOUS CONTINUOUS
Status: DISCONTINUED | OUTPATIENT
Start: 2020-06-09 | End: 2020-06-13 | Stop reason: HOSPADM

## 2020-06-09 RX ORDER — PROPOFOL 10 MG/ML
INJECTION, EMULSION INTRAVENOUS AS NEEDED
Status: DISCONTINUED | OUTPATIENT
Start: 2020-06-09 | End: 2020-06-09 | Stop reason: SURG

## 2020-06-09 RX ADMIN — PROPOFOL 50 MG: 10 INJECTION, EMULSION INTRAVENOUS at 11:33

## 2020-06-09 RX ADMIN — PROPOFOL 120 MG: 10 INJECTION, EMULSION INTRAVENOUS at 11:24

## 2020-06-09 RX ADMIN — PROPOFOL 50 MG: 10 INJECTION, EMULSION INTRAVENOUS at 11:39

## 2020-06-09 RX ADMIN — PROPOFOL 50 MG: 10 INJECTION, EMULSION INTRAVENOUS at 11:30

## 2020-06-09 RX ADMIN — SODIUM CHLORIDE 125 ML/HR: 0.9 INJECTION, SOLUTION INTRAVENOUS at 11:07

## 2020-06-09 RX ADMIN — PROPOFOL 50 MG: 10 INJECTION, EMULSION INTRAVENOUS at 11:27

## 2020-06-09 RX ADMIN — PROPOFOL 50 MG: 10 INJECTION, EMULSION INTRAVENOUS at 11:36

## 2020-06-09 RX ADMIN — PROPOFOL 50 MG: 10 INJECTION, EMULSION INTRAVENOUS at 11:25

## 2020-06-11 ENCOUNTER — TELEPHONE (OUTPATIENT)
Dept: GASTROENTEROLOGY | Facility: MEDICAL CENTER | Age: 50
End: 2020-06-11

## 2020-06-11 ENCOUNTER — TELEPHONE (OUTPATIENT)
Dept: OBGYN CLINIC | Facility: MEDICAL CENTER | Age: 50
End: 2020-06-11

## 2020-06-11 DIAGNOSIS — A04.8 H. PYLORI INFECTION: Primary | ICD-10-CM

## 2020-06-11 RX ORDER — TETRACYCLINE HYDROCHLORIDE 500 MG/1
500 CAPSULE ORAL 4 TIMES DAILY
Qty: 56 CAPSULE | Refills: 0 | Status: SHIPPED | OUTPATIENT
Start: 2020-06-11 | End: 2020-06-25

## 2020-06-11 RX ORDER — METRONIDAZOLE 500 MG/1
500 TABLET ORAL EVERY 8 HOURS SCHEDULED
Qty: 42 TABLET | Refills: 0 | Status: SHIPPED | OUTPATIENT
Start: 2020-06-11 | End: 2020-06-25

## 2020-06-11 RX ORDER — BISMUTH SUBSALICYLATE 262 MG/1
262 TABLET, CHEWABLE ORAL
Qty: 56 TABLET | Refills: 0 | Status: SHIPPED | OUTPATIENT
Start: 2020-06-11 | End: 2020-10-12

## 2020-06-11 RX ORDER — OMEPRAZOLE 20 MG/1
20 CAPSULE, DELAYED RELEASE ORAL 2 TIMES DAILY
Qty: 28 CAPSULE | Refills: 0 | Status: SHIPPED | OUTPATIENT
Start: 2020-06-11 | End: 2021-11-15

## 2020-06-15 ENCOUNTER — OFFICE VISIT (OUTPATIENT)
Dept: PHYSICAL THERAPY | Facility: MEDICAL CENTER | Age: 50
End: 2020-06-15
Payer: COMMERCIAL

## 2020-06-15 DIAGNOSIS — M54.6 CHRONIC BILATERAL THORACIC BACK PAIN: Primary | ICD-10-CM

## 2020-06-15 DIAGNOSIS — M54.50 LUMBAR PAIN: ICD-10-CM

## 2020-06-15 DIAGNOSIS — G89.29 CHRONIC BILATERAL THORACIC BACK PAIN: Primary | ICD-10-CM

## 2020-06-15 PROCEDURE — 97012 MECHANICAL TRACTION THERAPY: CPT | Performed by: PHYSICAL THERAPIST

## 2020-06-15 PROCEDURE — 97112 NEUROMUSCULAR REEDUCATION: CPT | Performed by: PHYSICAL THERAPIST

## 2020-06-15 PROCEDURE — 97140 MANUAL THERAPY 1/> REGIONS: CPT | Performed by: PHYSICAL THERAPIST

## 2020-06-15 PROCEDURE — 97110 THERAPEUTIC EXERCISES: CPT | Performed by: PHYSICAL THERAPIST

## 2020-06-16 ENCOUNTER — TELEPHONE (OUTPATIENT)
Dept: OBGYN CLINIC | Facility: MEDICAL CENTER | Age: 50
End: 2020-06-16

## 2020-06-17 ENCOUNTER — OFFICE VISIT (OUTPATIENT)
Dept: OBGYN CLINIC | Facility: MEDICAL CENTER | Age: 50
End: 2020-06-17
Payer: COMMERCIAL

## 2020-06-17 ENCOUNTER — DOCUMENTATION (OUTPATIENT)
Dept: GASTROENTEROLOGY | Facility: MEDICAL CENTER | Age: 50
End: 2020-06-17

## 2020-06-17 VITALS — BODY MASS INDEX: 32.47 KG/M2 | WEIGHT: 172 LBS | HEIGHT: 61 IN | TEMPERATURE: 97.7 F

## 2020-06-17 DIAGNOSIS — M54.50 CHRONIC RIGHT-SIDED LOW BACK PAIN, UNSPECIFIED WHETHER SCIATICA PRESENT: ICD-10-CM

## 2020-06-17 DIAGNOSIS — G89.29 CHRONIC RIGHT-SIDED LOW BACK PAIN, UNSPECIFIED WHETHER SCIATICA PRESENT: ICD-10-CM

## 2020-06-17 DIAGNOSIS — M17.12 PRIMARY OSTEOARTHRITIS OF LEFT KNEE: Primary | ICD-10-CM

## 2020-06-17 PROCEDURE — 99213 OFFICE O/P EST LOW 20 MIN: CPT | Performed by: ORTHOPAEDIC SURGERY

## 2020-06-17 PROCEDURE — 20610 DRAIN/INJ JOINT/BURSA W/O US: CPT | Performed by: ORTHOPAEDIC SURGERY

## 2020-06-17 RX ORDER — HYALURONATE SODIUM 10 MG/ML
20 SYRINGE (ML) INTRAARTICULAR
Status: COMPLETED | OUTPATIENT
Start: 2020-06-17 | End: 2020-06-17

## 2020-06-17 RX ADMIN — Medication 20 MG: at 14:19

## 2020-06-18 ENCOUNTER — TELEPHONE (OUTPATIENT)
Dept: GASTROENTEROLOGY | Facility: MEDICAL CENTER | Age: 50
End: 2020-06-18

## 2020-06-22 ENCOUNTER — OFFICE VISIT (OUTPATIENT)
Dept: PHYSICAL THERAPY | Facility: MEDICAL CENTER | Age: 50
End: 2020-06-22
Payer: COMMERCIAL

## 2020-06-22 DIAGNOSIS — G89.29 CHRONIC BILATERAL THORACIC BACK PAIN: Primary | ICD-10-CM

## 2020-06-22 DIAGNOSIS — M54.6 CHRONIC BILATERAL THORACIC BACK PAIN: Primary | ICD-10-CM

## 2020-06-22 DIAGNOSIS — M54.50 LUMBAR PAIN: ICD-10-CM

## 2020-06-22 PROCEDURE — 97140 MANUAL THERAPY 1/> REGIONS: CPT | Performed by: PHYSICAL THERAPIST

## 2020-06-22 PROCEDURE — 97112 NEUROMUSCULAR REEDUCATION: CPT | Performed by: PHYSICAL THERAPIST

## 2020-06-22 PROCEDURE — 97110 THERAPEUTIC EXERCISES: CPT | Performed by: PHYSICAL THERAPIST

## 2020-06-24 ENCOUNTER — OFFICE VISIT (OUTPATIENT)
Dept: OBGYN CLINIC | Facility: MEDICAL CENTER | Age: 50
End: 2020-06-24
Payer: COMMERCIAL

## 2020-06-24 VITALS — HEIGHT: 61 IN | TEMPERATURE: 97.3 F | BODY MASS INDEX: 32.47 KG/M2 | WEIGHT: 172 LBS

## 2020-06-24 DIAGNOSIS — M17.0 PRIMARY OSTEOARTHRITIS OF BOTH KNEES: Primary | ICD-10-CM

## 2020-06-24 PROCEDURE — 20610 DRAIN/INJ JOINT/BURSA W/O US: CPT | Performed by: ORTHOPAEDIC SURGERY

## 2020-06-29 ENCOUNTER — APPOINTMENT (OUTPATIENT)
Dept: PHYSICAL THERAPY | Facility: MEDICAL CENTER | Age: 50
End: 2020-06-29
Payer: COMMERCIAL

## 2020-07-01 ENCOUNTER — APPOINTMENT (OUTPATIENT)
Dept: PHYSICAL THERAPY | Facility: MEDICAL CENTER | Age: 50
End: 2020-07-01
Payer: COMMERCIAL

## 2020-07-02 PROBLEM — E66.09 CLASS 1 OBESITY DUE TO EXCESS CALORIES WITH SERIOUS COMORBIDITY AND BODY MASS INDEX (BMI) OF 32.0 TO 32.9 IN ADULT: Status: RESOLVED | Noted: 2020-03-13 | Resolved: 2020-07-02

## 2020-07-02 PROBLEM — E66.9 OBESITY (BMI 30.0-34.9): Status: ACTIVE | Noted: 2020-03-13

## 2020-07-02 PROBLEM — E66.811 CLASS 1 OBESITY DUE TO EXCESS CALORIES WITH SERIOUS COMORBIDITY AND BODY MASS INDEX (BMI) OF 32.0 TO 32.9 IN ADULT: Status: RESOLVED | Noted: 2020-03-13 | Resolved: 2020-07-02

## 2020-07-10 ENCOUNTER — OFFICE VISIT (OUTPATIENT)
Dept: OBGYN CLINIC | Facility: MEDICAL CENTER | Age: 50
End: 2020-07-10
Payer: COMMERCIAL

## 2020-07-10 VITALS
WEIGHT: 172.8 LBS | BODY MASS INDEX: 32.62 KG/M2 | DIASTOLIC BLOOD PRESSURE: 88 MMHG | HEIGHT: 61 IN | SYSTOLIC BLOOD PRESSURE: 145 MMHG | TEMPERATURE: 97.4 F | HEART RATE: 62 BPM

## 2020-07-10 DIAGNOSIS — M17.0 PRIMARY OSTEOARTHRITIS OF BOTH KNEES: Primary | ICD-10-CM

## 2020-07-10 DIAGNOSIS — M25.562 ACUTE PAIN OF BOTH KNEES: ICD-10-CM

## 2020-07-10 DIAGNOSIS — M25.561 ACUTE PAIN OF BOTH KNEES: ICD-10-CM

## 2020-07-10 PROCEDURE — 20610 DRAIN/INJ JOINT/BURSA W/O US: CPT | Performed by: EMERGENCY MEDICINE

## 2020-07-10 RX ORDER — HYALURONATE SODIUM 10 MG/ML
20 SYRINGE (ML) INTRAARTICULAR
Status: COMPLETED | OUTPATIENT
Start: 2020-07-10 | End: 2020-07-10

## 2020-07-10 RX ADMIN — Medication 20 MG: at 15:26

## 2020-07-10 NOTE — PROGRESS NOTES
Assessment/Plan:    Diagnoses and all orders for this visit:    Primary osteoarthritis of both knees  -     Large joint arthrocentesis: L knee    Acute pain of both knees  -     Large joint arthrocentesis: L knee        Return for 4-6 weeks for steroid injection left knee  Chief Complaint:     Chief Complaint   Patient presents with    Left Knee - Follow-up       Subjective:   Patient ID: Nehal Salgado is a 44 y o  female  Patient presents for left knee Euflexxa 3/3  She notes significant improvement in the past with steroid injection      Review of Systems    The following portions of the patient's chart were reviewed and updated as appropriate:    Allergy:  No Known Allergies      Past Medical History:   Diagnosis Date    Anxiety     Class 1 obesity due to excess calories with serious comorbidity and body mass index (BMI) of 32 0 to 32 9 in adult 3/13/2020    Depression     Hypertension     PTSD (post-traumatic stress disorder)        Past Surgical History:   Procedure Laterality Date    FACIAL FRACTURE SURGERY      TENDON REPAIR         Social History     Socioeconomic History    Marital status: Single     Spouse name: Not on file    Number of children: Not on file    Years of education: Not on file    Highest education level: Not on file   Occupational History    Not on file   Social Needs    Financial resource strain: Not on file    Food insecurity:     Worry: Not on file     Inability: Not on file    Transportation needs:     Medical: Not on file     Non-medical: Not on file   Tobacco Use    Smoking status: Light Tobacco Smoker     Packs/day: 0 25    Smokeless tobacco: Never Used   Substance and Sexual Activity    Alcohol use: Yes     Comment: social    Drug use: Yes     Types: Marijuana    Sexual activity: Yes     Partners: Male     Birth control/protection: None   Lifestyle    Physical activity:     Days per week: Not on file     Minutes per session: Not on file    Stress: Not on file   Relationships    Social connections:     Talks on phone: Not on file     Gets together: Not on file     Attends Zoroastrian service: Not on file     Active member of club or organization: Not on file     Attends meetings of clubs or organizations: Not on file     Relationship status: Not on file    Intimate partner violence:     Fear of current or ex partner: Not on file     Emotionally abused: Not on file     Physically abused: Not on file     Forced sexual activity: Not on file   Other Topics Concern    Not on file   Social History Narrative    Not on file       Family History   Problem Relation Age of Onset    No Known Problems Mother     No Known Problems Father     No Known Problems Sister     No Known Problems Daughter     Lung cancer Maternal Grandmother     No Known Problems Maternal Grandfather     No Known Problems Paternal Grandmother     No Known Problems Paternal Grandfather        Medications:    Current Outpatient Medications:     ALPRAZolam (XANAX) 1 mg tablet, ALPRAZolam 1 MG Oral Tablet  Quantity: 120;  Refills: 0   , M D ;  Started 25-June-2015 Active, Disp: , Rfl:     bismuth subsalicylate (PEPTO BISMOL) 262 MG chewable tablet, Chew 1 tablet (262 mg total) 4 (four) times a day (before meals and at bedtime), Disp: 56 tablet, Rfl: 0    celecoxib (CeleBREX) 200 mg capsule, Take 1 capsule (200 mg total) by mouth daily, Disp: 30 capsule, Rfl: 2    cloNIDine (CATAPRES-TTS-1) 0 1 mg/24 hr, Place 1 patch (0 1 mg total) on the skin once a week, Disp: 4 patch, Rfl: 5    cyclobenzaprine (FLEXERIL) 10 mg tablet, Take 1 tablet (10 mg total) by mouth 3 (three) times a day as needed for muscle spasms, Disp: 90 tablet, Rfl: 1    diclofenac sodium (VOLTAREN) 1 %, Apply 2 g topically 4 (four) times a day, Disp: 100 g, Rfl: 5    ergocalciferol (VITAMIN D2) 50,000 units, take 1 capsule by mouth every week, Disp: 4 capsule, Rfl: 2    fluticasone (FLONASE) 50 mcg/act nasal spray, 2 sprays into each nostril daily, Disp: 1 Bottle, Rfl: 3    gabapentin (NEURONTIN) 600 MG tablet, Take 1,200 mg by mouth 2 (two) times a day, Disp: , Rfl:     Lurasidone HCl (Latuda) 60 MG TABS, Latuda 60 mg tablet, Disp: , Rfl:     nicotine (NICODERM CQ) 21 mg/24 hr TD 24 hr patch, Place 1 patch on the skin every 24 hours, Disp: 28 patch, Rfl: 2    nicotine polacrilex (NICORETTE) 4 mg gum, , Disp: , Rfl:     omeprazole (PriLOSEC) 20 mg delayed release capsule, Take 1 capsule (20 mg total) by mouth daily Omeprazole 20 MG Oral Capsule Delayed Release take 1 capsule by mouth once daily  Quantity: 30;  Refills: 1    Ewa Stevens PAC;  Started 2-Oct-2015 Active, Disp: 90 capsule, Rfl: 1    omeprazole (PriLOSEC) 20 mg delayed release capsule, Take 1 capsule (20 mg total) by mouth 2 (two) times a day, Disp: 28 capsule, Rfl: 0    traZODone (DESYREL) 100 mg tablet, TraZODone HCl - 100 MG Oral Tablet  Quantity: 30;  Refills: 0   , M D ;  Started 17-Apr-2015 Active, Disp: , Rfl:     Patient Active Problem List   Diagnosis    Anxiety and depression    [facility]    Gastroesophageal reflux disease without esophagitis    HTN (hypertension)    Low serum vitamin D    Lumbar degenerative disc disease    Migraines    Obesity (BMI 30 0-34  9)    Tobacco use    Primary osteoarthritis of left knee    Encounter for screening colonoscopy       Objective:  /88 (BP Location: Left arm, Patient Position: Sitting, Cuff Size: Standard)   Pulse 62   Temp (!) 97 4 °F (36 3 °C)   Ht 5' 1" (1 549 m)   Wt 78 4 kg (172 lb 12 8 oz)   LMP 05/10/2016   BMI 32 65 kg/m²     Left Knee Exam     Other   Erythema: absent            Physical Exam      Neurologic Exam    Large joint arthrocentesis: L knee  Date/Time: 7/10/2020 3:26 PM  Consent given by: patient  Site marked: site marked  Timeout: Immediately prior to procedure a time out was called to verify the correct patient, procedure, equipment, support staff and site/side marked as required   Supporting Documentation  Indications: pain   Procedure Details  Location: knee - L knee  Preparation: Patient was prepped and draped in the usual sterile fashion  Needle size: 22 G  Ultrasound guidance: no  Approach: anterolateral  Medications administered: 20 mg Sodium Hyaluronate 20 MG/2ML    Patient tolerance: patient tolerated the procedure well with no immediate complications  Dressing:  Sterile dressing applied    Euflexxa #3          I have personally reviewed the written report of the pertinent studies

## 2020-07-13 ENCOUNTER — OFFICE VISIT (OUTPATIENT)
Dept: PHYSICAL THERAPY | Facility: MEDICAL CENTER | Age: 50
End: 2020-07-13
Payer: COMMERCIAL

## 2020-07-13 DIAGNOSIS — M54.6 CHRONIC BILATERAL THORACIC BACK PAIN: Primary | ICD-10-CM

## 2020-07-13 DIAGNOSIS — G89.29 CHRONIC BILATERAL THORACIC BACK PAIN: Primary | ICD-10-CM

## 2020-07-13 DIAGNOSIS — M54.50 LUMBAR PAIN: ICD-10-CM

## 2020-07-13 PROCEDURE — 97112 NEUROMUSCULAR REEDUCATION: CPT | Performed by: PHYSICAL THERAPIST

## 2020-07-13 PROCEDURE — 97110 THERAPEUTIC EXERCISES: CPT | Performed by: PHYSICAL THERAPIST

## 2020-07-13 PROCEDURE — 97140 MANUAL THERAPY 1/> REGIONS: CPT | Performed by: PHYSICAL THERAPIST

## 2020-07-15 ENCOUNTER — TELEPHONE (OUTPATIENT)
Dept: OBGYN CLINIC | Facility: MEDICAL CENTER | Age: 50
End: 2020-07-15

## 2020-07-15 ENCOUNTER — OFFICE VISIT (OUTPATIENT)
Dept: PHYSICAL THERAPY | Facility: MEDICAL CENTER | Age: 50
End: 2020-07-15
Payer: COMMERCIAL

## 2020-07-15 DIAGNOSIS — M25.562 ACUTE PAIN OF BOTH KNEES: ICD-10-CM

## 2020-07-15 DIAGNOSIS — M54.6 CHRONIC BILATERAL THORACIC BACK PAIN: Primary | ICD-10-CM

## 2020-07-15 DIAGNOSIS — G89.29 CHRONIC BILATERAL THORACIC BACK PAIN: Primary | ICD-10-CM

## 2020-07-15 DIAGNOSIS — M54.50 LUMBAR PAIN: ICD-10-CM

## 2020-07-15 DIAGNOSIS — M17.0 PRIMARY OSTEOARTHRITIS OF BOTH KNEES: Primary | ICD-10-CM

## 2020-07-15 DIAGNOSIS — M25.561 ACUTE PAIN OF BOTH KNEES: ICD-10-CM

## 2020-07-15 PROCEDURE — 97112 NEUROMUSCULAR REEDUCATION: CPT | Performed by: PHYSICAL THERAPIST

## 2020-07-15 PROCEDURE — 97110 THERAPEUTIC EXERCISES: CPT | Performed by: PHYSICAL THERAPIST

## 2020-07-15 PROCEDURE — 97140 MANUAL THERAPY 1/> REGIONS: CPT | Performed by: PHYSICAL THERAPIST

## 2020-07-15 NOTE — TELEPHONE ENCOUNTER
Patient came in to check on records, no records have been received  Patient will call for them      Dr Celia Tyler- patient is requesting PT for her legs stated she is having some pain and feels PT would help   Would need referral

## 2020-07-15 NOTE — PROGRESS NOTES
Daily Note     Today's date: 7/15/2020  Patient name: Frantz Anguiano  : 1970  MRN: [id number]  Referring provider: Brooklyn Hernandes PA-C  Dx:   Encounter Diagnosis     ICD-10-CM    1  Chronic bilateral thoracic back pain M54 6     G89 29    2  Lumbar pain M54 5                   Subjective: Nola Lesch reports that her neck has been feeling very good, low back feels stiff today       Objective: See treatment diary below      Assessment: Tolerated treatment well  Patient with reduced low back and mid back stiffness reported today  Able to increase scapular exercises without increase or spasm in back  Progress sa able      Plan: Continue per plan of care        Precautions: None      Manuals 4/27 5/4 6/1 6/8 6/15 6/22 7/13 7/15     Supine mid thoracic PA thrust AF AF    AF AF AF     UT release   AF AF AF  AF AF     SOR   AF AF AF  AF AF     Seated CTJ thrust    AF AF        Neuro Re-Ed             Bridges 2X10 3X10    3X10 purple 3X10   purple   3X10     Supine clamshells Green  3X10 Green  3X10    Purple  3X10 purple  3X10 purple  3x10     Scapular retractions Pink  3X10 Pink  3X10  Green  3X10 Green  3X10 [id number] green 3X10 green  3X10     Mod plank with hip abduciton      2X 10       TB extension        Blue  3x10                               Ther Ex             LTR 10 sec  X10 10 secX  10  10 sec  X10  10 sec  X 10 10 sec   X 10  10 sec   X10 10 sec  X 10      Sidelying lumbar rotation stretch 10 sec  X10 10 sec  X10           Thoracic rotation 30 30  30 30 30 30 30     Hip adduction  5 sec  X20           Thoracic extension    10 sec  X 10 10 sec   X10  10 sec   10 10 sec  X 10 10 sec  X x10                                             Ther Activity                                       Gait Training                                       Modalities             traction     65#/ 30#  10 min  70#  35#  10 min

## 2020-07-24 ENCOUNTER — OFFICE VISIT (OUTPATIENT)
Dept: PHYSICAL THERAPY | Facility: MEDICAL CENTER | Age: 50
End: 2020-07-24
Payer: COMMERCIAL

## 2020-07-24 DIAGNOSIS — M54.6 CHRONIC BILATERAL THORACIC BACK PAIN: Primary | ICD-10-CM

## 2020-07-24 DIAGNOSIS — G89.29 CHRONIC BILATERAL THORACIC BACK PAIN: Primary | ICD-10-CM

## 2020-07-24 PROCEDURE — 97012 MECHANICAL TRACTION THERAPY: CPT | Performed by: PHYSICAL THERAPIST

## 2020-07-24 PROCEDURE — 97110 THERAPEUTIC EXERCISES: CPT | Performed by: PHYSICAL THERAPIST

## 2020-07-24 PROCEDURE — 97140 MANUAL THERAPY 1/> REGIONS: CPT | Performed by: PHYSICAL THERAPIST

## 2020-07-24 PROCEDURE — 97010 HOT OR COLD PACKS THERAPY: CPT | Performed by: PHYSICAL THERAPIST

## 2020-07-24 NOTE — PROGRESS NOTES
Daily Note     Today's date: 2020  Patient name: Kendrick Kaminski  : 1970  MRN: [id number]  Referring provider: Paul Armendariz PA-C  Dx:   Encounter Diagnosis     ICD-10-CM    1  Chronic bilateral thoracic back pain M54 6     G89 29                   Subjective: Pt reports having mild middle and low back pain today  Objective: See treatment diary below      Assessment: Tolerated treatment well  Patient would benefit from continued PT  Pt exhibits good lumbar mobility and mild hypomobility in cervical and thoracic spine  Pt gets relief from manual therapy and traction  Continue to progress strengthening and mobility as tolerated  Plan: Continue per plan of care        Precautions: None      Manuals 4/27 5/4 6/1 6/8 6/15 6/22 7/13 7/15 7/24    Supine mid thoracic PA thrust AF AF    AF AF AF     UT release   AF AF AF  AF AF CK    SOR   AF AF AF  AF AF CK    Seated CTJ thrust    AF AF    CK    Neuro Re-Ed             Bridges 2X10 3X10    3X10 purple 3X10   purple   3X10 3x10    Supine clamshells Green  3X10 Green  3X10    Purple  3X10 purple  3X10 purple  3x10 purple 3x10    Scapular retractions Pink  3X10 Pink  3X10  Green  3X10 Green  3X10 [id number] green 3X10 green  3X10     Mod plank with hip abduciton      2X 10       TB extension        Blue  3x10                               Ther Ex             LTR 10 sec  X10 10 secX  10  10 sec  X10  10 sec  X 10 10 sec   X 10  10 sec   X10 10 sec  X 10  10x10    Sidelying lumbar rotation stretch 10 sec  X10 10 sec  X10           Thoracic rotation 30 30  30 30 30 30 30 30    Hip adduction  5 sec  X20           Thoracic extension    10 sec  X 10 10 sec   X10  10 sec   10 10 sec  X 10 10 sec  X x10                                             Ther Activity                                       Gait Training                                       Modalities             traction     65#/ 30#  10 min  70#  35#  10 min    70/30, 15min

## 2020-07-27 ENCOUNTER — APPOINTMENT (OUTPATIENT)
Dept: PHYSICAL THERAPY | Facility: MEDICAL CENTER | Age: 50
End: 2020-07-27
Payer: COMMERCIAL

## 2020-08-31 ENCOUNTER — OFFICE VISIT (OUTPATIENT)
Dept: OBGYN CLINIC | Facility: MEDICAL CENTER | Age: 50
End: 2020-08-31
Payer: COMMERCIAL

## 2020-08-31 VITALS
DIASTOLIC BLOOD PRESSURE: 87 MMHG | HEART RATE: 64 BPM | BODY MASS INDEX: 32.47 KG/M2 | HEIGHT: 61 IN | SYSTOLIC BLOOD PRESSURE: 153 MMHG | WEIGHT: 172 LBS

## 2020-08-31 DIAGNOSIS — M25.561 CHRONIC PAIN OF BOTH KNEES: Primary | ICD-10-CM

## 2020-08-31 DIAGNOSIS — M25.562 CHRONIC PAIN OF LEFT KNEE: ICD-10-CM

## 2020-08-31 DIAGNOSIS — M25.562 CHRONIC PAIN OF BOTH KNEES: Primary | ICD-10-CM

## 2020-08-31 DIAGNOSIS — M17.0 PRIMARY OSTEOARTHRITIS OF BOTH KNEES: ICD-10-CM

## 2020-08-31 DIAGNOSIS — I10 ESSENTIAL HYPERTENSION: ICD-10-CM

## 2020-08-31 DIAGNOSIS — G89.29 CHRONIC PAIN OF BOTH KNEES: Primary | ICD-10-CM

## 2020-08-31 DIAGNOSIS — G89.29 CHRONIC PAIN OF LEFT KNEE: ICD-10-CM

## 2020-08-31 PROCEDURE — 3008F BODY MASS INDEX DOCD: CPT | Performed by: NURSE PRACTITIONER

## 2020-08-31 PROCEDURE — 99213 OFFICE O/P EST LOW 20 MIN: CPT | Performed by: EMERGENCY MEDICINE

## 2020-08-31 PROCEDURE — 3077F SYST BP >= 140 MM HG: CPT | Performed by: EMERGENCY MEDICINE

## 2020-08-31 PROCEDURE — 4004F PT TOBACCO SCREEN RCVD TLK: CPT | Performed by: EMERGENCY MEDICINE

## 2020-08-31 PROCEDURE — 20610 DRAIN/INJ JOINT/BURSA W/O US: CPT | Performed by: EMERGENCY MEDICINE

## 2020-08-31 PROCEDURE — 3079F DIAST BP 80-89 MM HG: CPT | Performed by: EMERGENCY MEDICINE

## 2020-08-31 PROCEDURE — 3008F BODY MASS INDEX DOCD: CPT | Performed by: EMERGENCY MEDICINE

## 2020-08-31 PROCEDURE — 3008F BODY MASS INDEX DOCD: CPT | Performed by: ORTHOPAEDIC SURGERY

## 2020-08-31 RX ORDER — CLONIDINE 0.1 MG/24H
PATCH, EXTENDED RELEASE TRANSDERMAL
Qty: 4 PATCH | Refills: 1 | Status: SHIPPED | OUTPATIENT
Start: 2020-08-31 | End: 2020-10-21

## 2020-08-31 RX ORDER — TRIAMCINOLONE ACETONIDE 40 MG/ML
40 INJECTION, SUSPENSION INTRA-ARTICULAR; INTRAMUSCULAR
Status: COMPLETED | OUTPATIENT
Start: 2020-08-31 | End: 2020-08-31

## 2020-08-31 RX ORDER — LIDOCAINE HYDROCHLORIDE 10 MG/ML
4 INJECTION, SOLUTION INFILTRATION; PERINEURAL
Status: COMPLETED | OUTPATIENT
Start: 2020-08-31 | End: 2020-08-31

## 2020-08-31 RX ADMIN — LIDOCAINE HYDROCHLORIDE 4 ML: 10 INJECTION, SOLUTION INFILTRATION; PERINEURAL at 13:38

## 2020-08-31 RX ADMIN — TRIAMCINOLONE ACETONIDE 40 MG: 40 INJECTION, SUSPENSION INTRA-ARTICULAR; INTRAMUSCULAR at 13:38

## 2020-08-31 NOTE — PATIENT INSTRUCTIONS
Check out Arthritis  org      Arthritis   AMBULATORY CARE:   Arthritis  is a disease that causes inflammation in one or more joints  There are many types of arthritis, such as osteoarthritis, rheumatoid arthritis, and septic arthritis  Some types cause inflammation in the joints  Other types wear away the cartilage between joints  This makes the bones of the joint rub together when you move the joint  Your symptoms may be constant, or symptoms may come and go  Arthritis often gets worse over time and can cause permanent joint damage  Common signs and symptoms of arthritis:   · Pain, swelling, or stiffness in the joint    · Limited range of motion in the joint    · Warmth or redness over the joint    · Tenderness when you touch the joint    · Stiff joints in the morning that loosen with movement    · A creaking or grinding sound when you move the joint    · Fever  Seek care immediately if:   · You have a fever and severe joint pain or swelling  · You cannot move the affected joint  · You have severe joint pain you cannot tolerate  Contact your healthcare provider if:   · Your pain or swelling does not get better with treatment  · You have questions or concerns about your condition or care  Treatment  will depend on the type of arthritis you have and if it is severe  You may need any of the following:  · Acetaminophen  decreases pain and fever  It is available without a doctor's order  Ask how much to take and how often to take it  Follow directions  Acetaminophen can cause liver damage if not taken correctly  · NSAIDs , such as ibuprofen, help decrease swelling, pain, and fever  This medicine is available with or without a doctor's order  NSAIDs can cause stomach bleeding or kidney problems in certain people  If you take blood thinner medicine, always ask your healthcare provider if NSAIDs are safe for you  Always read the medicine label and follow directions      · Steroid medicine  helps reduce swelling and pain  · Surgery  may be needed to repair or replace a damaged joint  Manage arthritis:   · Rest your painful joint so it can heal   Your healthcare provider may recommend crutches or a walker if the affected joint is in a leg  · Apply ice or heat to the joint  Both can help decrease swelling and pain  Ice may also help prevent tissue damage  Use an ice pack, or put crushed ice in a plastic bag  Cover it with a towel and place it on your joint for 15 to 20 minutes every hour or as directed  You can apply heat for 20 minutes every 2 hours  Heat treatment includes hot packs or heat lamps  · Elevate your joint  Elevation helps reduce swelling and pain  Raise your joint above the level of your heart as often as you can  Prop your painful joint on pillows to keep it above your heart comfortably  · Go to physical or occupational therapy as directed  A physical therapist can teach you exercises to improve flexibility and range of motion  You may also be shown non-weight-bearing exercises that are safe for your joints, such as swimming  Exercise can help keep your joints flexible and reduce pain  An occupational therapist can help you learn to do your daily activities when your joints are stiff or sore  · Maintain a healthy weight  Extra weight puts increased pressure on your joints  Ask your healthcare provider what you should weigh  If you need to lose weight, he can help you create a weight loss program  Weight loss can help reduce pain and increase your ability to do your activities  The amount of exercise you do may vary each day, depending on your symptoms  · Wear flat or low-heeled shoes  This will help decrease pain and reduce pressure on your ankle, knee, and hip joints  · Use support devices as directed  You may be given splints to wear on your hands to help your joints rest and to decrease inflammation   While you sleep, use a pillow that is firm enough to support your neck and head  Other equipment  that may help you move and prevent falls:  · Orthotic shoes or insoles  help support your feet when you walk  · Crutches, a cane, or a walker  may help decrease your risk for falling  They also decrease stress on affected joints  · Devices to prevent falls  include raised toilet seats and bathtub bars to help you get up from sitting  Handrails can be placed in areas where you need balance and support  Follow up with your healthcare provider or rheumatologist as directed:  Write down your questions so you remember to ask them during your visits  © 2017 [employer]  Information is for End User's use only and may not be sold, redistributed or otherwise used for commercial purposes  All illustrations and images included in CareNotes® are the copyrighted property of [employer] , [employer]  or [employer]  The above information is an  only  It is not intended as medical advice for individual conditions or treatments  Talk to your doctor, nurse or pharmacist before following any medical regimen to see if it is safe and effective for you  Steroid Joint Injection   AMBULATORY CARE:   What you need to know about steroid joint injection:  A steroid joint injection is a procedure to inject steroid medicine into a joint  Steroid medicine decreases pain and inflammation  The injection may also contain an anesthetic (numbing medicine) to decrease pain  It may be done to treat conditions such as arthritis, gout, or carpal tunnel syndrome  The injections may be given in your knee, ankle, shoulder, elbow, wrist, or ankle  How to prepare for steroid joint injection:  Your healthcare provider will talk to you about how to prepare for this procedure  He will tell you what medicines to take or not take on the day of your procedure  You may need to stop taking blood thinners several days before your procedure     What will happen during steroid joint injection:  You may be given local anesthesia to numb the area where the injection will be given  With local anesthesia, you may still feel pressure during the procedure, but you should not feel any pain  Your healthcare provider may use ultrasound or fluoroscopy (a type of x-ray) to guide the needle to the right area  He will then inject the steroid into your joint  A bandage will be placed on the injection site  What will happen after steroid joint injection:  You may have redness, warmth, or sweating in your face and chest right after the steroid injection  Steroids can affect blood sugar levels  If you have diabetes, you should check your blood sugars closely in the first 24 hours after your procedure  Risks of steroid joint injection:  You may get an infection in your joint  The injection may also cause more pain during the first 24 to 36 hours  You may need more than one injection to feel pain relief  The skin near the injection site may be damaged and become discolored or indented  This can happen if the steroid is placed too close to your skin  A tendon near the injection site may rupture or a nerve can be damaged  Contact your healthcare provider if:   · You have fever or chills  · You have redness or swelling in the injection site  · You have more pain than usual in your joint for more than 72 hours  · You have questions or concerns about your condition or care  Medicines:   · Pain medicine  may be given  Ask how to take this medicine safely  · Take your medicine as directed  Contact your healthcare provider if you think your medicine is not helping or if you have side effects  Tell him or her if you are allergic to any medicine  Keep a list of the medicines, vitamins, and herbs you take  Include the amounts, and when and why you take them  Bring the list or the pill bottles to follow-up visits  Carry your medicine list with you in case of an emergency    Self-care:   · Leave the bandage on for 8 to 12 hours  Care for your wound as directed  · Rest the area  as directed  You may need to decrease weight on certain joints, such as the knee, for a period of time  Ask when you can return to your daily activities  · Elevate  your limb where the steroid injection was given  Elevate the limb above the level of your heart as often as you can  This will help decrease swelling and pain  Prop your limb on pillows or blankets to keep it elevated comfortably  · Apply ice  on your joint for 15 to 20 minutes every hour or as directed  Use an ice pack, or put crushed ice in a plastic bag  Cover it with a towel  Ice helps prevent tissue damage and decreases swelling and pain  Follow up with your healthcare provider as directed:  Write down your questions so you remember to ask them during your visits  © 2017 [employer] Information is for End User's use only and may not be sold, redistributed or otherwise used for commercial purposes  All illustrations and images included in CareNotes® are the copyrighted property of [employer]  or [employer]  The above information is an  only  It is not intended as medical advice for individual conditions or treatments  Talk to your doctor, nurse or pharmacist before following any medical regimen to see if it is safe and effective for you

## 2020-09-02 DIAGNOSIS — M51.36 LUMBAR DEGENERATIVE DISC DISEASE: ICD-10-CM

## 2020-09-02 RX ORDER — CELECOXIB 200 MG/1
CAPSULE ORAL
Qty: 30 CAPSULE | Refills: 2 | Status: SHIPPED | OUTPATIENT
Start: 2020-09-02 | End: 2020-12-09

## 2020-10-12 ENCOUNTER — OFFICE VISIT (OUTPATIENT)
Dept: FAMILY MEDICINE CLINIC | Facility: CLINIC | Age: 50
End: 2020-10-12

## 2020-10-12 VITALS
HEIGHT: 61 IN | RESPIRATION RATE: 16 BRPM | OXYGEN SATURATION: 98 % | HEART RATE: 76 BPM | DIASTOLIC BLOOD PRESSURE: 86 MMHG | BODY MASS INDEX: 33.23 KG/M2 | WEIGHT: 176 LBS | SYSTOLIC BLOOD PRESSURE: 148 MMHG | TEMPERATURE: 97 F

## 2020-10-12 DIAGNOSIS — I10 ESSENTIAL HYPERTENSION: ICD-10-CM

## 2020-10-12 DIAGNOSIS — G43.709 CHRONIC MIGRAINE WITHOUT AURA WITHOUT STATUS MIGRAINOSUS, NOT INTRACTABLE: ICD-10-CM

## 2020-10-12 DIAGNOSIS — Z23 ENCOUNTER FOR IMMUNIZATION: Primary | ICD-10-CM

## 2020-10-12 DIAGNOSIS — M51.36 LUMBAR DEGENERATIVE DISC DISEASE: ICD-10-CM

## 2020-10-12 DIAGNOSIS — M17.12 PRIMARY OSTEOARTHRITIS OF LEFT KNEE: ICD-10-CM

## 2020-10-12 DIAGNOSIS — Z72.0 TOBACCO USE: ICD-10-CM

## 2020-10-12 DIAGNOSIS — E66.9 OBESITY (BMI 30.0-34.9): ICD-10-CM

## 2020-10-12 PROCEDURE — 4004F PT TOBACCO SCREEN RCVD TLK: CPT | Performed by: PHYSICIAN ASSISTANT

## 2020-10-12 PROCEDURE — 90682 RIV4 VACC RECOMBINANT DNA IM: CPT

## 2020-10-12 PROCEDURE — 90471 IMMUNIZATION ADMIN: CPT

## 2020-10-12 PROCEDURE — 99214 OFFICE O/P EST MOD 30 MIN: CPT | Performed by: PHYSICIAN ASSISTANT

## 2020-10-12 PROCEDURE — 3008F BODY MASS INDEX DOCD: CPT | Performed by: PHYSICIAN ASSISTANT

## 2020-10-12 RX ORDER — TOPIRAMATE 25 MG/1
25 TABLET ORAL DAILY
Qty: 30 TABLET | Refills: 2 | Status: SHIPPED | OUTPATIENT
Start: 2020-10-12 | End: 2021-01-11

## 2020-10-12 RX ORDER — TIZANIDINE 2 MG/1
2 TABLET ORAL EVERY 8 HOURS PRN
Qty: 90 TABLET | Refills: 1 | Status: SHIPPED | OUTPATIENT
Start: 2020-10-12 | End: 2021-03-12 | Stop reason: SDUPTHER

## 2020-10-20 DIAGNOSIS — I10 ESSENTIAL HYPERTENSION: ICD-10-CM

## 2020-10-21 RX ORDER — CLONIDINE 0.1 MG/24H
PATCH, EXTENDED RELEASE TRANSDERMAL
Qty: 4 PATCH | Refills: 5 | Status: SHIPPED | OUTPATIENT
Start: 2020-10-21 | End: 2021-04-06

## 2020-10-22 ENCOUNTER — TELEPHONE (OUTPATIENT)
Dept: FAMILY MEDICINE CLINIC | Facility: CLINIC | Age: 50
End: 2020-10-22

## 2020-10-26 ENCOUNTER — OFFICE VISIT (OUTPATIENT)
Dept: OBGYN CLINIC | Facility: MEDICAL CENTER | Age: 50
End: 2020-10-26
Payer: COMMERCIAL

## 2020-10-26 VITALS
DIASTOLIC BLOOD PRESSURE: 87 MMHG | BODY MASS INDEX: 32.1 KG/M2 | SYSTOLIC BLOOD PRESSURE: 147 MMHG | HEIGHT: 61 IN | HEART RATE: 77 BPM | WEIGHT: 170 LBS

## 2020-10-26 DIAGNOSIS — M25.562 CHRONIC PAIN OF BOTH KNEES: Primary | ICD-10-CM

## 2020-10-26 DIAGNOSIS — G89.29 CHRONIC PAIN OF BOTH KNEES: Primary | ICD-10-CM

## 2020-10-26 DIAGNOSIS — M17.0 PRIMARY OSTEOARTHRITIS OF BOTH KNEES: ICD-10-CM

## 2020-10-26 DIAGNOSIS — M25.561 CHRONIC PAIN OF BOTH KNEES: Primary | ICD-10-CM

## 2020-10-26 PROCEDURE — 3077F SYST BP >= 140 MM HG: CPT | Performed by: EMERGENCY MEDICINE

## 2020-10-26 PROCEDURE — 3008F BODY MASS INDEX DOCD: CPT | Performed by: PHYSICIAN ASSISTANT

## 2020-10-26 PROCEDURE — 3008F BODY MASS INDEX DOCD: CPT | Performed by: EMERGENCY MEDICINE

## 2020-10-26 PROCEDURE — 99213 OFFICE O/P EST LOW 20 MIN: CPT | Performed by: EMERGENCY MEDICINE

## 2020-10-26 PROCEDURE — 3079F DIAST BP 80-89 MM HG: CPT | Performed by: EMERGENCY MEDICINE

## 2020-12-07 DIAGNOSIS — E55.9 VITAMIN D DEFICIENCY: ICD-10-CM

## 2020-12-08 ENCOUNTER — TELEPHONE (OUTPATIENT)
Dept: OBGYN CLINIC | Facility: HOSPITAL | Age: 50
End: 2020-12-08

## 2020-12-08 NOTE — TELEPHONE ENCOUNTER
Patient is calling wondering if she would still be able to go to PT after her injections on Thursday

## 2020-12-09 DIAGNOSIS — M51.36 LUMBAR DEGENERATIVE DISC DISEASE: ICD-10-CM

## 2020-12-09 RX ORDER — ERGOCALCIFEROL 1.25 MG/1
CAPSULE ORAL
Qty: 4 CAPSULE | Refills: 2 | Status: SHIPPED | OUTPATIENT
Start: 2020-12-09 | End: 2021-03-12

## 2020-12-09 RX ORDER — CELECOXIB 200 MG/1
CAPSULE ORAL
Qty: 30 CAPSULE | Refills: 2 | Status: SHIPPED | OUTPATIENT
Start: 2020-12-09 | End: 2021-11-15

## 2020-12-11 ENCOUNTER — TELEPHONE (OUTPATIENT)
Dept: FAMILY MEDICINE CLINIC | Facility: CLINIC | Age: 50
End: 2020-12-11

## 2020-12-11 DIAGNOSIS — R09.81 SINUS CONGESTION: Primary | ICD-10-CM

## 2020-12-11 NOTE — TELEPHONE ENCOUNTER
I called patient and relayed message  She is having issues with the injections so I suggested she made an appt to discuss  She is scheduled for Monday

## 2021-01-11 DIAGNOSIS — G43.709 CHRONIC MIGRAINE WITHOUT AURA WITHOUT STATUS MIGRAINOSUS, NOT INTRACTABLE: ICD-10-CM

## 2021-01-11 RX ORDER — TOPIRAMATE 25 MG/1
TABLET ORAL
Qty: 30 TABLET | Refills: 3 | Status: SHIPPED | OUTPATIENT
Start: 2021-01-11 | End: 2021-05-05

## 2021-01-20 ENCOUNTER — OFFICE VISIT (OUTPATIENT)
Dept: OBGYN CLINIC | Facility: MEDICAL CENTER | Age: 51
End: 2021-01-20
Payer: COMMERCIAL

## 2021-01-20 VITALS
SYSTOLIC BLOOD PRESSURE: 128 MMHG | WEIGHT: 170 LBS | HEART RATE: 106 BPM | HEIGHT: 61 IN | BODY MASS INDEX: 32.1 KG/M2 | DIASTOLIC BLOOD PRESSURE: 85 MMHG

## 2021-01-20 DIAGNOSIS — G89.29 CHRONIC PAIN OF BOTH KNEES: Primary | ICD-10-CM

## 2021-01-20 DIAGNOSIS — M54.50 CHRONIC RIGHT-SIDED LOW BACK PAIN, UNSPECIFIED WHETHER SCIATICA PRESENT: ICD-10-CM

## 2021-01-20 DIAGNOSIS — M62.559 ATROPHY OF QUADRICEPS FEMORIS MUSCLE: ICD-10-CM

## 2021-01-20 DIAGNOSIS — M25.562 CHRONIC PAIN OF BOTH KNEES: Primary | ICD-10-CM

## 2021-01-20 DIAGNOSIS — G89.29 CHRONIC RIGHT-SIDED LOW BACK PAIN, UNSPECIFIED WHETHER SCIATICA PRESENT: ICD-10-CM

## 2021-01-20 DIAGNOSIS — M25.561 CHRONIC PAIN OF BOTH KNEES: Primary | ICD-10-CM

## 2021-01-20 DIAGNOSIS — M17.0 PRIMARY OSTEOARTHRITIS OF BOTH KNEES: ICD-10-CM

## 2021-01-20 PROCEDURE — 99213 OFFICE O/P EST LOW 20 MIN: CPT | Performed by: EMERGENCY MEDICINE

## 2021-01-20 PROCEDURE — 4004F PT TOBACCO SCREEN RCVD TLK: CPT | Performed by: EMERGENCY MEDICINE

## 2021-01-20 PROCEDURE — 20610 DRAIN/INJ JOINT/BURSA W/O US: CPT | Performed by: EMERGENCY MEDICINE

## 2021-01-20 RX ORDER — HYALURONATE SODIUM 10 MG/ML
20 SYRINGE (ML) INTRAARTICULAR
Status: COMPLETED | OUTPATIENT
Start: 2021-01-20 | End: 2021-01-20

## 2021-01-20 RX ADMIN — Medication 20 MG: at 15:47

## 2021-01-20 NOTE — PROGRESS NOTES
Assessment/Plan:    Diagnoses and all orders for this visit:    Chronic pain of both knees  -     Ambulatory referral to Physical Therapy; Future    Primary osteoarthritis of both knees  -     Ambulatory referral to Physical Therapy; Future    Chronic right-sided low back pain, unspecified whether sciatica present  -     Ambulatory referral to Physical Therapy; Future    Atrophy of quadriceps femoris muscle    Other orders  -     Large joint arthrocentesis    Patient to see Pain Management for chronic back pain  She does have left quadriceps muscle atrophy and extension weakness on exam   This could be contributing to her OA pain  She will restart land PT, continue aqua therapy  Return for as scheduled  B/L Euflexxa 2/3    Chief Complaint:     Chief Complaint   Patient presents with    Left Knee - Follow-up    Right Knee - Follow-up       Subjective:   Patient ID: Megha Jj is a 44 y o  female  Patient returns for b/l knee Euflexxa injections 1/3  She only had 1-2 days of benefit from steroid injection 8/2020  She is participating in water therapy, hopes to restart land PT  She does have chronic back pain and is seeing Pain Management February Previous note:  Patient returns with improvement of b/l knee pains, she has been benefiting from medial  brace and [facility]  She did undergo Left knee Visco injections this summer and left knee steroid injection in August   She says her right knee is bothering her more than her left  Previous note:  Patient returns for chronic left knee pain and osteoarthritis last seen she received of viscosupplementation injections which provided mild benefit  She is here today for a steroid injection  She has not started formal physical therapy as of yet  Review of Systems    The following portions of the patient's chart were reviewed and updated as appropriate:    Allergy:  No Known Allergies      Past Medical History: Diagnosis Date    Anxiety     Class 1 obesity due to excess calories with serious comorbidity and body mass index (BMI) of 32 0 to 32 9 in adult 3/13/2020    Depression     Hypertension     PTSD (post-traumatic stress disorder)        Past Surgical History:   Procedure Laterality Date    FACIAL FRACTURE SURGERY      TENDON REPAIR         Social History     Socioeconomic History    Marital status: Single     Spouse name: Not on file    Number of children: Not on file    Years of education: Not on file    Highest education level: Not on file   Occupational History    Not on file   Social Needs    Financial resource strain: Not on file    Food insecurity     Worry: Not on file     Inability: Not on file    Transportation needs     Medical: Not on file     Non-medical: Not on file   Tobacco Use    Smoking status: Light Tobacco Smoker     Packs/day: 0 25    Smokeless tobacco: Never Used   Substance and Sexual Activity    Alcohol use: Yes     Comment: social    Drug use: Yes     Types: Marijuana    Sexual activity: Yes     Partners: Male     Birth control/protection: None   Lifestyle    Physical activity     Days per week: Not on file     Minutes per session: Not on file    Stress: Not on file   Relationships    Social connections     Talks on phone: Not on file     Gets together: Not on file     Attends [employer] service: Not on file     Active member of club or organization: Not on file     Attends meetings of clubs or organizations: Not on file     Relationship status: Not on file    Intimate partner violence     Fear of current or ex partner: Not on file     Emotionally abused: Not on file     Physically abused: Not on file     Forced sexual activity: Not on file   Other Topics Concern    Not on file   Social History Narrative    Not on file       Family History   Problem Relation Age of Onset    No Known Problems Mother     No Known Problems Father     No Known Problems Sister     No Known Problems Daughter     Lung cancer Maternal Grandmother     No Known Problems Maternal Grandfather     No Known Problems Paternal Grandmother     No Known Problems Paternal Grandfather        Medications:    Current Outpatient Medications:     ALPRAZolam (XANAX) 1 mg tablet, ALPRAZolam 1 MG Oral Tablet  Quantity: 120;  Refills: 0   , M D ;  Started 25-June-2015 Active, Disp: , Rfl:     celecoxib (CeleBREX) 200 mg capsule, take 1 capsule by mouth once daily, Disp: 30 capsule, Rfl: 2    cloNIDine (CATAPRES-TTS-1) 0 1 mg/24 hr, APPLY 1 PATCH ON THE SKIN ONCE A WEEK, Disp: 4 patch, Rfl: 5    diclofenac sodium (VOLTAREN) 1 %, Apply 2 g topically 4 (four) times a day, Disp: 100 g, Rfl: 5    ergocalciferol (VITAMIN D2) 50,000 units, take 1 capsule by mouth every week, Disp: 4 capsule, Rfl: 2    fluticasone (FLONASE) 50 mcg/act nasal spray, 2 sprays into each nostril daily, Disp: 1 Bottle, Rfl: 3    gabapentin (NEURONTIN) 600 MG tablet, Take 1,200 mg by mouth 2 (two) times a day, Disp: , Rfl:     Lurasidone HCl (Latuda) 60 MG TABS, Latuda 60 mg tablet, Disp: , Rfl:     nicotine (NICODERM CQ) 21 mg/24 hr TD 24 hr patch, Place 1 patch on the skin every 24 hours, Disp: 28 patch, Rfl: 2    nicotine polacrilex (NICORETTE) 4 mg gum, , Disp: , Rfl:     omeprazole (PriLOSEC) 20 mg delayed release capsule, Take 1 capsule (20 mg total) by mouth daily Omeprazole 20 MG Oral Capsule Delayed Release take 1 capsule by mouth once daily  Quantity: 30;  Refills: 1    Ewa Stevens PAC;  Started 2-Oct-2015 Active, Disp: 90 capsule, Rfl: 1    omeprazole (PriLOSEC) 20 mg delayed release capsule, Take 1 capsule (20 mg total) by mouth 2 (two) times a day, Disp: 28 capsule, Rfl: 0    tiZANidine (ZANAFLEX) 2 mg tablet, Take 1 tablet (2 mg total) by mouth every 8 (eight) hours as needed for muscle spasms, Disp: 90 tablet, Rfl: 1    topiramate (TOPAMAX) 25 mg tablet, take 1 tablet by mouth once daily, Disp: 30 tablet, Rfl: 3   traZODone (DESYREL) 100 mg tablet, TraZODone HCl - 100 MG Oral Tablet  Quantity: 30;  Refills: 0   , M D ;  Started 17-Apr-2015 Active, Disp: , Rfl:     Patient Active Problem List   Diagnosis    Anxiety and depression    Bipolar disorder ([facility] [address] )    Gastroesophageal reflux disease without esophagitis    HTN (hypertension)    Low serum vitamin D    Lumbar degenerative disc disease    Migraines    Obesity (BMI 30 0-34  9)    Tobacco use    Primary osteoarthritis of left knee    Encounter for screening colonoscopy       Objective:  /85   Pulse (!) 106   Ht 5' 1" (1 549 m)   Wt 77 1 kg (170 lb)   LMP 05/10/2016   BMI 32 12 kg/m²     Right Knee Exam     Range of Motion   The patient has normal right knee ROM  Other   Erythema: absent      Left Knee Exam     Range of Motion   The patient has normal left knee ROM  Other   Erythema: absent    Comments:  Muscle wasting quadriceps with knee extension weakness             Physical Exam      Neurologic Exam    Large joint arthrocentesis: bilateral knee  Universal Protocol:  Consent: Verbal consent obtained  Risks and benefits: risks, benefits and alternatives were discussed  Consent given by: patient  Time out: Immediately prior to procedure a "time out" was called to verify the correct patient, procedure, equipment, support staff and site/side marked as required    Timeout called at: 1/20/2021 3:44 PM   Patient understanding: patient states understanding of the procedure being performed  Test results: test results available and properly labeled  Site marked: the operative site was marked  Patient identity confirmed: verbally with patient    Supporting Documentation  Indications: pain   Procedure Details  Location: knee - bilateral knee  Preparation: Patient was prepped and draped in the usual sterile fashion  Needle size: 22 G  Ultrasound guidance: no  Approach: anterolateral    Medications (Right): 20 mg Sodium Hyaluronate 20 MG/2MLMedications (Left): 20 mg Sodium Hyaluronate 20 MG/2ML   Patient tolerance: patient tolerated the procedure well with no immediate complications  Dressing:  Sterile dressing applied          I have personally reviewed the written report of the pertinent studies

## 2021-01-27 ENCOUNTER — OFFICE VISIT (OUTPATIENT)
Dept: OBGYN CLINIC | Facility: MEDICAL CENTER | Age: 51
End: 2021-01-27
Payer: COMMERCIAL

## 2021-01-27 VITALS
WEIGHT: 170 LBS | SYSTOLIC BLOOD PRESSURE: 148 MMHG | DIASTOLIC BLOOD PRESSURE: 65 MMHG | HEIGHT: 61 IN | HEART RATE: 45 BPM | BODY MASS INDEX: 32.1 KG/M2

## 2021-01-27 DIAGNOSIS — G89.29 CHRONIC PAIN OF BOTH KNEES: Primary | ICD-10-CM

## 2021-01-27 DIAGNOSIS — M25.562 CHRONIC PAIN OF BOTH KNEES: Primary | ICD-10-CM

## 2021-01-27 DIAGNOSIS — M25.561 CHRONIC PAIN OF BOTH KNEES: Primary | ICD-10-CM

## 2021-01-27 DIAGNOSIS — M17.0 PRIMARY OSTEOARTHRITIS OF BOTH KNEES: ICD-10-CM

## 2021-01-27 PROCEDURE — 20610 DRAIN/INJ JOINT/BURSA W/O US: CPT | Performed by: EMERGENCY MEDICINE

## 2021-01-27 PROCEDURE — 3008F BODY MASS INDEX DOCD: CPT | Performed by: EMERGENCY MEDICINE

## 2021-01-27 RX ORDER — HYALURONATE SODIUM 10 MG/ML
20 SYRINGE (ML) INTRAARTICULAR
Status: COMPLETED | OUTPATIENT
Start: 2021-01-27 | End: 2021-01-27

## 2021-01-27 RX ADMIN — Medication 20 MG: at 14:21

## 2021-01-27 NOTE — PROGRESS NOTES
Assessment/Plan:    Diagnoses and all orders for this visit:    Chronic pain of both knees  -     Large joint arthrocentesis: bilateral knee    Primary osteoarthritis of both knees  -     Large joint arthrocentesis: bilateral knee        No follow-ups on file  Chief Complaint:     Chief Complaint   Patient presents with    Right Knee - Follow-up    Left Knee - Follow-up       Subjective:   Patient ID: Valentin Arreola is a 44 y o  female  Patient presents for Euflexxa 2/3 bilateral knees, she did notice increased pain of the right knee however no fevers chills or other worrisome symptoms      Review of Systems    The following portions of the patient's chart were reviewed and updated as appropriate:    Allergy:  No Known Allergies      Past Medical History:   Diagnosis Date    Anxiety     Class 1 obesity due to excess calories with serious comorbidity and body mass index (BMI) of 32 0 to 32 9 in adult 3/13/2020    Depression     Hypertension     PTSD (post-traumatic stress disorder)        Past Surgical History:   Procedure Laterality Date    FACIAL FRACTURE SURGERY      TENDON REPAIR         Social History     Socioeconomic History    Marital status: Single     Spouse name: Not on file    Number of children: Not on file    Years of education: Not on file    Highest education level: Not on file   Occupational History    Not on file   Social Needs    Financial resource strain: Not on file    Food insecurity     Worry: Not on file     Inability: Not on file    Transportation needs     Medical: Not on file     Non-medical: Not on file   Tobacco Use    Smoking status: Light Tobacco Smoker     Packs/day: 0 25    Smokeless tobacco: Never Used   Substance and Sexual Activity    Alcohol use: Yes     Comment: social    Drug use: Yes     Types: Marijuana    Sexual activity: Yes     Partners: Male     Birth control/protection: None   Lifestyle    Physical activity     Days per week: Not on file Minutes per session: Not on file    Stress: Not on file   Relationships    Social connections     Talks on phone: Not on file     Gets together: Not on file     Attends Confucianism service: Not on file     Active member of club or organization: Not on file     Attends meetings of clubs or organizations: Not on file     Relationship status: Not on file    Intimate partner violence     Fear of current or ex partner: Not on file     Emotionally abused: Not on file     Physically abused: Not on file     Forced sexual activity: Not on file   Other Topics Concern    Not on file   Social History Narrative    Not on file       Family History   Problem Relation Age of Onset    No Known Problems Mother     No Known Problems Father     No Known Problems Sister     No Known Problems Daughter     Lung cancer Maternal Grandmother     No Known Problems Maternal Grandfather     No Known Problems Paternal Grandmother     No Known Problems Paternal Grandfather        Medications:    Current Outpatient Medications:     ALPRAZolam (XANAX) 1 mg tablet, ALPRAZolam 1 MG Oral Tablet  Quantity: 120;  Refills: 0   , M D ;  Started 25-June-2015 Active, Disp: , Rfl:     celecoxib (CeleBREX) 200 mg capsule, take 1 capsule by mouth once daily, Disp: 30 capsule, Rfl: 2    cloNIDine (CATAPRES-TTS-1) 0 1 mg/24 hr, APPLY 1 PATCH ON THE SKIN ONCE A WEEK, Disp: 4 patch, Rfl: 5    diclofenac sodium (VOLTAREN) 1 %, Apply 2 g topically 4 (four) times a day, Disp: 100 g, Rfl: 5    ergocalciferol (VITAMIN D2) 50,000 units, take 1 capsule by mouth every week, Disp: 4 capsule, Rfl: 2    fluticasone (FLONASE) 50 mcg/act nasal spray, 2 sprays into each nostril daily, Disp: 1 Bottle, Rfl: 3    gabapentin (NEURONTIN) 600 MG tablet, Take 1,200 mg by mouth 2 (two) times a day, Disp: , Rfl:     Lurasidone HCl (Latuda) 60 MG TABS, Latuda 60 mg tablet, Disp: , Rfl:     nicotine (NICODERM CQ) 21 mg/24 hr TD 24 hr patch, Place 1 patch on the skin every 24 hours, Disp: 28 patch, Rfl: 2    nicotine polacrilex (NICORETTE) 4 mg gum, , Disp: , Rfl:     omeprazole (PriLOSEC) 20 mg delayed release capsule, Take 1 capsule (20 mg total) by mouth daily Omeprazole 20 MG Oral Capsule Delayed Release take 1 capsule by mouth once daily  Quantity: 30;  Refills: 1    Ewa Stevens PAC;  Started 2-Oct-2015 Active, Disp: 90 capsule, Rfl: 1    omeprazole (PriLOSEC) 20 mg delayed release capsule, Take 1 capsule (20 mg total) by mouth 2 (two) times a day, Disp: 28 capsule, Rfl: 0    tiZANidine (ZANAFLEX) 2 mg tablet, Take 1 tablet (2 mg total) by mouth every 8 (eight) hours as needed for muscle spasms, Disp: 90 tablet, Rfl: 1    topiramate (TOPAMAX) 25 mg tablet, take 1 tablet by mouth once daily, Disp: 30 tablet, Rfl: 3    traZODone (DESYREL) 100 mg tablet, TraZODone HCl - 100 MG Oral Tablet  Quantity: 30;  Refills: 0   , M D ;  Started 17-Apr-2015 Active, Disp: , Rfl:     Patient Active Problem List   Diagnosis    Anxiety and depression    Bipolar disorder ([facility] [address] )    Gastroesophageal reflux disease without esophagitis    HTN (hypertension)    Low serum vitamin D    Lumbar degenerative disc disease    Migraines    Obesity (BMI 30 0-34  9)    Tobacco use    Primary osteoarthritis of left knee    Encounter for screening colonoscopy       Objective:  /65   Pulse (!) 45   Ht 5' 1" (1 549 m)   Wt 77 1 kg (170 lb)   LMP 05/10/2016   BMI 32 12 kg/m²     Right Knee Exam     Other   Erythema: absent      Left Knee Exam     Other   Erythema: absent            Physical Exam      Neurologic Exam    Large joint arthrocentesis: bilateral knee  Universal Protocol:  Consent: Verbal consent obtained  Risks and benefits: risks, benefits and alternatives were discussed  Consent given by: patient  Time out: Immediately prior to procedure a "time out" was called to verify the correct patient, procedure, equipment, support staff and site/side marked as required    Timeout called at: 1/27/2021 2:20 PM   Patient understanding: patient states understanding of the procedure being performed  Test results: test results available and properly labeled  Site marked: the operative site was marked  Patient identity confirmed: verbally with patient    Supporting Documentation  Indications: pain   Procedure Details  Location: knee - bilateral knee  Preparation: Patient was prepped and draped in the usual sterile fashion  Needle size: 22 G  Ultrasound guidance: no  Approach: anterolateral    Medications (Right): 20 mg Sodium Hyaluronate 20 MG/2MLMedications (Left): 20 mg Sodium Hyaluronate 20 MG/2ML   Patient tolerance: patient tolerated the procedure well with no immediate complications  Dressing:  Sterile dressing applied          I have personally reviewed the written report of the pertinent studies

## 2021-01-29 ENCOUNTER — TELEPHONE (OUTPATIENT)
Dept: FAMILY MEDICINE CLINIC | Facility: CLINIC | Age: 51
End: 2021-01-29

## 2021-01-29 NOTE — TELEPHONE ENCOUNTER
SIGNATURES NEEDED FOR [facility] FORM RECEIVED VIA FAX  WILL BE PLACED IN YOUR BIN AT ASSIGNED DELIVERY TIMES        PT/OT/ST Forms

## 2021-02-10 ENCOUNTER — OFFICE VISIT (OUTPATIENT)
Dept: OBGYN CLINIC | Facility: MEDICAL CENTER | Age: 51
End: 2021-02-10
Payer: COMMERCIAL

## 2021-02-10 ENCOUNTER — TELEPHONE (OUTPATIENT)
Dept: FAMILY MEDICINE CLINIC | Facility: CLINIC | Age: 51
End: 2021-02-10

## 2021-02-10 VITALS
BODY MASS INDEX: 32.1 KG/M2 | HEIGHT: 61 IN | DIASTOLIC BLOOD PRESSURE: 88 MMHG | SYSTOLIC BLOOD PRESSURE: 149 MMHG | HEART RATE: 91 BPM | WEIGHT: 170 LBS

## 2021-02-10 DIAGNOSIS — M25.561 CHRONIC PAIN OF BOTH KNEES: Primary | ICD-10-CM

## 2021-02-10 DIAGNOSIS — M25.562 CHRONIC PAIN OF BOTH KNEES: Primary | ICD-10-CM

## 2021-02-10 DIAGNOSIS — M17.0 PRIMARY OSTEOARTHRITIS OF BOTH KNEES: ICD-10-CM

## 2021-02-10 DIAGNOSIS — G89.29 CHRONIC PAIN OF BOTH KNEES: Primary | ICD-10-CM

## 2021-02-10 PROCEDURE — 20610 DRAIN/INJ JOINT/BURSA W/O US: CPT | Performed by: EMERGENCY MEDICINE

## 2021-02-10 RX ORDER — HYALURONATE SODIUM 10 MG/ML
20 SYRINGE (ML) INTRAARTICULAR
Status: COMPLETED | OUTPATIENT
Start: 2021-02-10 | End: 2021-02-10

## 2021-02-10 RX ADMIN — Medication 20 MG: at 15:51

## 2021-02-10 NOTE — PROGRESS NOTES
Assessment/Plan:    Diagnoses and all orders for this visit:    Chronic pain of both knees  -     Large joint arthrocentesis: bilateral knee    Primary osteoarthritis of both knees  -     Large joint arthrocentesis: bilateral knee    No benefit from previous steroid injections  Call in 5 months if patient wishes to repeat visco    Return if symptoms worsen or fail to improve  Chief Complaint:     Chief Complaint   Patient presents with    Left Knee - Follow-up    Right Knee - Follow-up       Subjective:   Patient ID: Mary Kendall is a 44 y o  female  B/L euflexxa 3/3      Review of Systems    The following portions of the patient's chart were reviewed and updated as appropriate:    Allergy:  No Known Allergies      Past Medical History:   Diagnosis Date    Anxiety     Class 1 obesity due to excess calories with serious comorbidity and body mass index (BMI) of 32 0 to 32 9 in adult 3/13/2020    Depression     Hypertension     PTSD (post-traumatic stress disorder)        Past Surgical History:   Procedure Laterality Date    FACIAL FRACTURE SURGERY      TENDON REPAIR         Social History     Socioeconomic History    Marital status: Single     Spouse name: Not on file    Number of children: Not on file    Years of education: Not on file    Highest education level: Not on file   Occupational History    Not on file   Social Needs    Financial resource strain: Not on file    Food insecurity     Worry: Not on file     Inability: Not on file    Transportation needs     Medical: Not on file     Non-medical: Not on file   Tobacco Use    Smoking status: Light Tobacco Smoker     Packs/day: 0 25    Smokeless tobacco: Never Used   Substance and Sexual Activity    Alcohol use: Yes     Comment: social    Drug use: Yes     Types: Marijuana    Sexual activity: Yes     Partners: Male     Birth control/protection: None   Lifestyle    Physical activity     Days per week: Not on file     Minutes per session: Not on file    Stress: Not on file   Relationships    Social connections     Talks on phone: Not on file     Gets together: Not on file     Attends Mormon service: Not on file     Active member of club or organization: Not on file     Attends meetings of clubs or organizations: Not on file     Relationship status: Not on file    Intimate partner violence     Fear of current or ex partner: Not on file     Emotionally abused: Not on file     Physically abused: Not on file     Forced sexual activity: Not on file   Other Topics Concern    Not on file   Social History Narrative    Not on file       Family History   Problem Relation Age of Onset    No Known Problems Mother     No Known Problems Father     No Known Problems Sister     No Known Problems Daughter     Lung cancer Maternal Grandmother     No Known Problems Maternal Grandfather     No Known Problems Paternal Grandmother     No Known Problems Paternal Grandfather        Medications:    Current Outpatient Medications:     ALPRAZolam (XANAX) 1 mg tablet, ALPRAZolam 1 MG Oral Tablet  Quantity: 120;  Refills: 0   , M D ;  Started 25-June-2015 Active, Disp: , Rfl:     celecoxib (CeleBREX) 200 mg capsule, take 1 capsule by mouth once daily, Disp: 30 capsule, Rfl: 2    cloNIDine (CATAPRES-TTS-1) 0 1 mg/24 hr, APPLY 1 PATCH ON THE SKIN ONCE A WEEK, Disp: 4 patch, Rfl: 5    diclofenac sodium (VOLTAREN) 1 %, Apply 2 g topically 4 (four) times a day, Disp: 100 g, Rfl: 5    ergocalciferol (VITAMIN D2) 50,000 units, take 1 capsule by mouth every week, Disp: 4 capsule, Rfl: 2    fluticasone (FLONASE) 50 mcg/act nasal spray, 2 sprays into each nostril daily, Disp: 1 Bottle, Rfl: 3    gabapentin (NEURONTIN) 600 MG tablet, Take 1,200 mg by mouth 2 (two) times a day, Disp: , Rfl:     Lurasidone HCl (Latuda) 60 MG TABS, Latuda 60 mg tablet, Disp: , Rfl:     nicotine (NICODERM CQ) 21 mg/24 hr TD 24 hr patch, Place 1 patch on the skin every 24 hours, Disp: 28 patch, Rfl: 2    nicotine polacrilex (NICORETTE) 4 mg gum, , Disp: , Rfl:     omeprazole (PriLOSEC) 20 mg delayed release capsule, Take 1 capsule (20 mg total) by mouth daily Omeprazole 20 MG Oral Capsule Delayed Release take 1 capsule by mouth once daily  Quantity: 30;  Refills: 1    Ewa Stevens PAC;  Started 2-Oct-2015 Active, Disp: 90 capsule, Rfl: 1    omeprazole (PriLOSEC) 20 mg delayed release capsule, Take 1 capsule (20 mg total) by mouth 2 (two) times a day, Disp: 28 capsule, Rfl: 0    tiZANidine (ZANAFLEX) 2 mg tablet, Take 1 tablet (2 mg total) by mouth every 8 (eight) hours as needed for muscle spasms, Disp: 90 tablet, Rfl: 1    topiramate (TOPAMAX) 25 mg tablet, take 1 tablet by mouth once daily, Disp: 30 tablet, Rfl: 3    traZODone (DESYREL) 100 mg tablet, TraZODone HCl - 100 MG Oral Tablet  Quantity: 30;  Refills: 0   , M D ;  Started 17-Apr-2015 Active, Disp: , Rfl:     Patient Active Problem List   Diagnosis    Anxiety and depression    B[facility] disorder ([facility] [address] )    Gastroesophageal reflux disease without esophagitis    HTN (hypertension)    Low serum vitamin D    Lumbar degenerative disc disease    Migraines    Obesity (BMI 30 0-34  9)    Tobacco use    Primary osteoarthritis of left knee    Encounter for screening colonoscopy    Primary osteoarthritis of both knees    Chronic pain of both knees       Objective:  /88   Pulse 91   Ht 5' 1" (1 549 m)   Wt 77 1 kg (170 lb)   LMP 05/10/2016   BMI 32 12 kg/m²     Right Knee Exam     Other   Erythema: absent      Left Knee Exam     Other   Erythema: absent            Physical Exam      Neurologic Exam    Large joint arthrocentesis: bilateral knee  Universal Protocol:  Consent: Verbal consent obtained    Risks and benefits: risks, benefits and alternatives were discussed  Consent given by: patient  Time out: Immediately prior to procedure a "time out" was called to verify the correct patient, procedure, equipment, support staff and site/side marked as required  Timeout called at: 2/10/2021 3:50 PM   Patient understanding: patient states understanding of the procedure being performed  Test results: test results available and properly labeled  Site marked: the operative site was marked  Patient identity confirmed: verbally with patient    Supporting Documentation  Indications: pain   Procedure Details  Location: knee - bilateral knee  Preparation: Patient was prepped and draped in the usual sterile fashion  Needle size: 22 G  Ultrasound guidance: no  Approach: anterolateral    Medications (Right): 20 mg Sodium Hyaluronate 20 MG/2MLMedications (Left): 20 mg Sodium Hyaluronate 20 MG/2ML   Patient tolerance: patient tolerated the procedure well with no immediate complications  Dressing:  Sterile dressing applied          I have personally reviewed the written report of the pertinent studies

## 2021-02-10 NOTE — TELEPHONE ENCOUNTER
SIGNATURES NEEDED FOR [facility]  FORM RECEIVED VIA FAX  WILL BE PLACED IN YOUR BIN AT ASSIGNED DELIVERY TIMES        ENCOUNTER # [id number]

## 2021-02-12 ENCOUNTER — CONSULT (OUTPATIENT)
Dept: PAIN MEDICINE | Facility: MEDICAL CENTER | Age: 51
End: 2021-02-12
Payer: COMMERCIAL

## 2021-02-12 VITALS
TEMPERATURE: 98 F | BODY MASS INDEX: 32.66 KG/M2 | DIASTOLIC BLOOD PRESSURE: 106 MMHG | SYSTOLIC BLOOD PRESSURE: 168 MMHG | HEART RATE: 101 BPM | RESPIRATION RATE: 16 BRPM | HEIGHT: 61 IN | WEIGHT: 173 LBS

## 2021-02-12 DIAGNOSIS — G57.01 PIRIFORMIS SYNDROME OF RIGHT SIDE: ICD-10-CM

## 2021-02-12 DIAGNOSIS — M46.1 SACROILIITIS (HCC): Primary | ICD-10-CM

## 2021-02-12 PROCEDURE — 99244 OFF/OP CNSLTJ NEW/EST MOD 40: CPT | Performed by: PHYSICAL MEDICINE & REHABILITATION

## 2021-02-12 PROCEDURE — 4004F PT TOBACCO SCREEN RCVD TLK: CPT | Performed by: PHYSICAL MEDICINE & REHABILITATION

## 2021-02-12 NOTE — PATIENT INSTRUCTIONS
Piriformis Syndrome   WHAT YOU NEED TO KNOW:   Piriformis syndrome is sciatic nerve pain caused by an injured or overused piriformis muscle  This is a muscle inside your buttocks that helps you move your leg  The pain is caused when this muscle pinches your sciatic nerve  You may feel the pain in your hip or down your leg  DISCHARGE INSTRUCTIONS:   Medicines: You may need any of the following:  · Prescription medicines  may be used to relax your muscles and decrease pain and swelling  · NSAIDs  help decrease swelling and pain  This medicine is available with or without a doctor's order  NSAIDs can cause stomach bleeding or kidney problems in certain people  If you take blood thinner medicine, always ask your healthcare provider if NSAIDs are safe for you  Always read the medicine label and follow directions  · Acetaminophen  decreases pain  It is available without a doctor's order  Ask how much to take and how often to take it  Follow directions  Acetaminophen can cause liver damage if not taken correctly  · Take your medicine as directed  Contact your healthcare provider if you think your medicine is not helping or if you have side effects  Tell him or her if you are allergic to any medicine  Keep a list of the medicines, vitamins, and herbs you take  Include the amounts, and when and why you take them  Bring the list or the pill bottles to follow-up visits  Carry your medicine list with you in case of an emergency  Follow up with your healthcare provider as directed: You may need to return for more tests  You may also be referred to a physical therapist  Write down your questions so you remember to ask them during your visits  Manage your symptoms of piriformis syndrome:   · Rest as directed  Avoid activities that make your pain worse  · Apply ice to the buttock on your injured side  Use an ice pack, or put crushed ice in a plastic bag  Cover it with a towel   Leave the ice on for 15 to 20 minutes every hour, or as directed  Ice helps prevent tissue damage and decreases swelling and pain  · Apply heat to the buttock on your injured side  Use heating pads for 20 to 30 minutes every 2 hours for as many days as directed  Heat helps decrease pain and muscle spasms  · Stretch as directed  Lie on your back with your knees bent  Place the ankle of your injured leg on the knee of your other leg  Gently pull your bent leg toward your chest, until you feel a stretch in the buttock of your injured leg  A physical therapist may show you other exercises to stretch and strengthen your hip muscles  Contact your healthcare provider if:   · Your pain worsens or returns, even with treatment  · You have questions or concerns about your condition or care  Return to the emergency department if:   · You cannot move your leg or foot  © Copyright [address] Information is for End User's use only and may not be sold, redistributed or otherwise used for commercial purposes  All illustrations and images included in CareNotes® are the copyrighted property of A D A M [employer] Inc  or [facility] [employer]   The above information is an  only  It is not intended as medical advice for individual conditions or treatments  Talk to your doctor, nurse or pharmacist before following any medical regimen to see if it is safe and effective for you

## 2021-02-12 NOTE — PROGRESS NOTES
Assessment  1  Sacroiliitis ([facility] [address] )    2  Piriformis syndrome of right side        Plan  Ms Edgar Soto   Is a pleasant 47-year-old female who presents for initial evaluation regarding several years duration of low back pain with radiating symptoms into the right buttock as well as isolated neck pain  During today's evaluation her worst pain appears to be coming from the low back and buttock  She is currently demonstrating clinical evidence of suspected low back pain that is multifactorial in nature with a majority of her pain generators appear to be coming from the SI joint as well as the  Piriformis musculature  At this time we will   1  Plan for a right-sided SI joint injection as well as piriformis injection under fluoro guidance  2  Advised to continue with Tylenol no more than 3000 mg per day  3  Complete risks and benefits including bleeding, infection, tissue reaction, nerve injury and allergic reaction were discussed  The approach was demonstrated using models and literature was provided  Verbal and written consent was obtained  My impressions and treatment recommendations were discussed in detail with the patient who verbalized understanding and had no further questions  Discharge instructions were provided  I personally saw and examined the patient and I agree with the above discussed plan of care  Orders Placed This Encounter   Procedures    [place] spine and pain procedure     Standing Status:   Future     Standing Expiration Date:   2/12/2025     Order Specific Question:   Reason for Exam:     Answer:   Right SIJ and right piriformis inj     Order Specific Question:   Is the patient pregnant? Answer:   No     Order Specific Question:   Anticoagulant hold needed? Answer:   No     No orders of the defined types were placed in this encounter        History of Present Illness    Janine Saeed is a 44 y o  female Presents to [facility] for initial evaluation regarding several years duration of neck, midback and low back pain  Patient denies any significant inciting event or recent trauma  Today reports severe pain rated 7 to 10/10 and interfering with daily activities  Pain is constant 100% of the time with no specific pattern of morning, afternoon or evening  Describes symptoms of sharp, throbbing, aching pain  Also complains of upper and lower extremity weakness but denies falls  Uses a cane for ambulation  Pain is worse with standing, bending, sitting, walking, exercise  She has had moderate relief with nerve injections, physical therapy, chiropractic treatments and no significant relief with heat  Patient has tried a multitude of oral medications including opiates MS Contin, oxycodone, Darvocet as well as over-the-counter NSAIDs including Tylenol, Celebrex, ibuprofen and membrane stabilizing agents amitriptyline, gabapentin, carbamazepine  Presents today for initial evaluation  I have personally reviewed and/or updated the patient's past medical history, past surgical history, family history, social history, current medications, allergies, and vital signs today  Review of Systems   Constitutional: Positive for unexpected weight change  Negative for fever  HENT: Negative for trouble swallowing  Eyes: Negative for visual disturbance  Respiratory: Negative for shortness of breath and wheezing  Cardiovascular: Negative for chest pain and palpitations  Gastrointestinal: Negative for constipation, diarrhea, nausea and vomiting  Endocrine: Negative for cold intolerance, heat intolerance and polydipsia  Genitourinary: Negative for difficulty urinating and frequency  Musculoskeletal: Positive for myalgias, neck pain and neck stiffness  Negative for arthralgias, gait problem and joint swelling  Skin: Negative for rash  Neurological: Positive for dizziness and headaches  Negative for seizures, syncope and weakness     Hematological: Does not bruise/bleed easily  Psychiatric/Behavioral: Positive for dysphoric mood  The patient is nervous/anxious  All other systems reviewed and are negative  Patient Active Problem List   Diagnosis    Anxiety and depression    Bipolar disorder ([address] )    Gastroesophageal reflux disease without esophagitis    HTN (hypertension)    Low serum vitamin D    Lumbar degenerative disc disease    Migraines    Obesity (BMI 30 0-34  9)    Tobacco use    Primary osteoarthritis of left knee    Encounter for screening colonoscopy    Primary osteoarthritis of both knees    Chronic pain of both knees       Past Medical History:   Diagnosis Date    Anxiety     Arthritis     Chronic neck and back pain     Class 1 obesity due to excess calories with serious comorbidity and body mass index (BMI) of 32 0 to 32 9 in adult 3/13/2020    Depression     GERD (gastroesophageal reflux disease)     Hypertension     PTSD (post-traumatic stress disorder)        Past Surgical History:   Procedure Laterality Date    FACIAL FRACTURE SURGERY      FRACTURE SURGERY      ORTHOPEDIC SURGERY      TENDON REPAIR         Family History   Problem Relation Age of Onset    No Known Problems Mother     No Known Problems Father     No Known Problems Sister     No Known Problems Daughter     Lung cancer Maternal Grandmother     No Known Problems Maternal Grandfather     No Known Problems Paternal Grandmother     No Known Problems Paternal Grandfather        Social History     Occupational History    Not on file   Tobacco Use    Smoking status: Light Tobacco Smoker     Packs/day: 0 25    Smokeless tobacco: Never Used   Substance and Sexual Activity    Alcohol use: Yes     Comment: social    Drug use: Yes     Types: Marijuana    Sexual activity: Yes     Partners: Male     Birth control/protection: None       Current Outpatient Medications on File Prior to Visit   Medication Sig    ALPRAZolam (XANAX) 1 mg tablet ALPRAZolam 1 MG Oral Tablet Quantity: 120;  Refills: 0   , M D ;  Started 25-June-2015 Active    celecoxib (CeleBREX) 200 mg capsule take 1 capsule by mouth once daily    cloNIDine (CATAPRES-TTS-1) 0 1 mg/24 hr APPLY 1 PATCH ON THE SKIN ONCE A WEEK    diclofenac sodium (VOLTAREN) 1 % Apply 2 g topically 4 (four) times a day    ergocalciferol (VITAMIN D2) 50,000 units take 1 capsule by mouth every week    fluticasone (FLONASE) 50 mcg/act nasal spray 2 sprays into each nostril daily    gabapentin (NEURONTIN) 600 MG tablet Take 1,200 mg by mouth 2 (two) times a day    Lurasidone HCl (Latuda) 60 MG TABS Latuda 60 mg tablet    nicotine (NICODERM CQ) 21 mg/24 hr TD 24 hr patch Place 1 patch on the skin every 24 hours    nicotine polacrilex (NICORETTE) 4 mg gum     omeprazole (PriLOSEC) 20 mg delayed release capsule Take 1 capsule (20 mg total) by mouth daily Omeprazole 20 MG Oral Capsule Delayed Release take 1 capsule by mouth once daily  Quantity: 30;  Refills: 1    Ewa Stevens PAC;  Started 2-Oct-2015 Active    tiZANidine (ZANAFLEX) 2 mg tablet Take 1 tablet (2 mg total) by mouth every 8 (eight) hours as needed for muscle spasms    topiramate (TOPAMAX) 25 mg tablet take 1 tablet by mouth once daily    traZODone (DESYREL) 100 mg tablet TraZODone HCl - 100 MG Oral Tablet  Quantity: 30;  Refills: 0   , M D ;  Started 17-Apr-2015 Active    omeprazole (PriLOSEC) 20 mg delayed release capsule Take 1 capsule (20 mg total) by mouth 2 (two) times a day (Patient not taking: Reported on 2/12/2021)     No current facility-administered medications on file prior to visit  No Known Allergies    Physical Exam    BP (!) 168/106   Pulse 101   Temp 98 °F (36 7 °C)   Resp 16   Ht 5' 1" (1 549 m)   Wt 78 5 kg (173 lb)   LMP 05/10/2016   BMI 32 69 kg/m²     Constitutional: normal, well developed, well nourished, alert, in no distress and non-toxic and no overt pain behavior    Eyes: anicteric  HEENT: grossly intact  Neck: supple, symmetric, trachea midline and no masses   Pulmonary:even and unlabored  Cardiovascular:No edema or pitting edema present  Skin:Normal without rashes or lesions and well hydrated  Psychiatric:Mood and affect appropriate  Neurologic:  Cranial nerves grossly intact  Musculoskeletal:  Gait normal, tenderness to palpation right-sided lumbar paraspinals and distal to PSIS as well as glutes, decreased active and passive range of motion with lumbar flexion and extension limited by pain, MMT 5/5 bilateral lower extremities, sensation grossly intact to light touch, DTRs within normal limits,  POSITIVE Tammie finger Right-sided  POSITIVE Roberto's test  Right-sided  POSITIVE Gaenslen's maneuver  Right-sided   POSITIVE  FADIR right-sided    Imaging  Impression:  1  No acute cervical spine abnormality  Mild degenerative changes  Workstation:[id number]   Result Narrative   Three-view cervical spine    Clinical history: Injury, fall    5 views of the cervical spine are performed  There is relative maintenance of  normal vertebral body height and alignment  There is disc space narrowing at  C5-C6 with small anterior osteophytes  No prevertebral soft tissue swelling is  evident  Interspinous distance is well-preserved  There is mild posterior  spondylosis at C4-C5 and C5-C6  There is uncovertebral hypertrophy at several  levels  The neural foramen appear widely patent at all levels except for mild  left-sided neural foraminal narrowing at C4-C5  No odontoid fracture seen  The  imaged lung apices are clear  Other Result Information   Interface, Rad Results In - 07/20/2018  2:07 PM EDT  Three-view cervical spine    Clinical history: Injury, fall    5 views of the cervical spine are performed  There is relative maintenance of  normal vertebral body height and alignment  There is disc space narrowing at  C5-C6 with small anterior osteophytes  No prevertebral soft tissue swelling is  evident  Interspinous distance is well-preserved   There is mild posterior  spondylosis at C4-C5 and C5-C6  There is uncovertebral hypertrophy at several  levels  The neural foramen appear widely patent at all levels except for mild  left-sided neural foraminal narrowing at C4-C5  No odontoid fracture seen  The  imaged lung apices are clear  IMPRESSION:  Impression:  1  No acute cervical spine abnormality  Mild degenerative changes

## 2021-03-05 ENCOUNTER — HOSPITAL ENCOUNTER (OUTPATIENT)
Dept: RADIOLOGY | Facility: MEDICAL CENTER | Age: 51
Discharge: HOME | End: 2021-03-05
Attending: PHYSICAL MEDICINE & REHABILITATION | Admitting: PHYSICAL MEDICINE & REHABILITATION
Payer: COMMERCIAL

## 2021-03-05 VITALS
OXYGEN SATURATION: 98 % | DIASTOLIC BLOOD PRESSURE: 95 MMHG | TEMPERATURE: 97.5 F | HEART RATE: 98 BPM | RESPIRATION RATE: 20 BRPM | SYSTOLIC BLOOD PRESSURE: 149 MMHG

## 2021-03-05 DIAGNOSIS — M46.1 SACROILIITIS (HCC): ICD-10-CM

## 2021-03-05 DIAGNOSIS — G57.01 PIRIFORMIS SYNDROME OF RIGHT SIDE: ICD-10-CM

## 2021-03-05 PROCEDURE — NC001 PR NO CHARGE: Performed by: PHYSICAL MEDICINE & REHABILITATION

## 2021-03-05 PROCEDURE — 27096 INJECT SACROILIAC JOINT: CPT | Performed by: PHYSICAL MEDICINE & REHABILITATION

## 2021-03-05 PROCEDURE — 20552 NJX 1/MLT TRIGGER POINT 1/2: CPT | Performed by: PHYSICAL MEDICINE & REHABILITATION

## 2021-03-05 RX ORDER — LIDOCAINE HYDROCHLORIDE 10 MG/ML
5 INJECTION, SOLUTION EPIDURAL; INFILTRATION; INTRACAUDAL; PERINEURAL ONCE
Status: COMPLETED | OUTPATIENT
Start: 2021-03-05 | End: 2021-03-05

## 2021-03-05 RX ORDER — BUPIVACAINE HCL/PF 2.5 MG/ML
10 VIAL (ML) INJECTION ONCE
Status: COMPLETED | OUTPATIENT
Start: 2021-03-05 | End: 2021-03-05

## 2021-03-05 RX ORDER — METHYLPREDNISOLONE ACETATE 80 MG/ML
80 INJECTION, SUSPENSION INTRA-ARTICULAR; INTRALESIONAL; INTRAMUSCULAR; PARENTERAL; SOFT TISSUE ONCE
Status: COMPLETED | OUTPATIENT
Start: 2021-03-05 | End: 2021-03-05

## 2021-03-05 RX ADMIN — Medication 4 ML: at 12:01

## 2021-03-05 RX ADMIN — METHYLPREDNISOLONE ACETATE 80 MG: 80 INJECTION, SUSPENSION INTRA-ARTICULAR; INTRALESIONAL; INTRAMUSCULAR; PARENTERAL; SOFT TISSUE at 12:01

## 2021-03-05 RX ADMIN — IOHEXOL 2 ML: 300 INJECTION, SOLUTION INTRAVENOUS at 12:00

## 2021-03-05 RX ADMIN — LIDOCAINE HYDROCHLORIDE 2 ML: 10 INJECTION, SOLUTION EPIDURAL; INFILTRATION; INTRACAUDAL; PERINEURAL at 11:59

## 2021-03-05 NOTE — DISCHARGE INSTRUCTIONS
Steroid Joint Injection   WHAT YOU NEED TO KNOW:   A steroid joint injection is a procedure to inject steroid medicine into a joint  Steroid medicine decreases pain and inflammation  The injection may also contain an anesthetic (numbing medicine) to decrease pain  It may be done to treat conditions such as arthritis, gout, or carpal tunnel syndrome  The injections may be given in your knee, ankle, shoulder, elbow, wrist, ankle or sacroiliac joint  1  Do not apply heat to any area that is numb  If you have discomfort or soreness at the injection site, you may apply ice today, 20 minutes on and 20 minutes off  Tomorrow you may use ice or warm, moist heat  Do not apply ice or heat directly to the skin  2  You may have an increase or change in the discomfort for 36-48 hours after your treatment  Apply ice and continue with any pain medicine you have been prescribed  3  Do not do anything strenuous today  You may shower, but no tub baths or hot tubs today  You may resume your normal activities tomorrow, but do not overdo it  Resume normal activities slowly when you are feeling better  4  If you experience redness, drainage or swelling at the injection site, or if you develop a fever above 100 degrees, please call [facility] at [phone] or go to the Emergency Room  5  Continue to take all routine medicines prescribed by your primary care physician unless otherwise instructed by our staff  Most blood thinners should be started again according to your regularly scheduled dosing  If you have any questions, please give our office a call  If you have a problem specifically related to your procedure, please call our office at [phone]  Problems not related to your procedure should be directed to your primary care physician

## 2021-03-05 NOTE — H&P
History of Present Illness: The patient is a 44 y o  female who presents with complaints of Right-sided low back and buttock pain    Patient Active Problem List   Diagnosis    Anxiety and depression    Bipolar disorder ([address] )    Gastroesophageal reflux disease without esophagitis    HTN (hypertension)    Low serum vitamin D    Lumbar degenerative disc disease    Migraines    Obesity (BMI 30 0-34  9)    Tobacco use    Primary osteoarthritis of left knee    Encounter for screening colonoscopy    Primary osteoarthritis of both knees    Chronic pain of both knees       Past Medical History:   Diagnosis Date    Anxiety     Arthritis     Chronic neck and back pain     Class 1 obesity due to excess calories with serious comorbidity and body mass index (BMI) of 32 0 to 32 9 in adult 3/13/2020    Depression     GERD (gastroesophageal reflux disease)     Hypertension     PTSD (post-traumatic stress disorder)        Past Surgical History:   Procedure Laterality Date    FACIAL FRACTURE SURGERY      FRACTURE SURGERY      ORTHOPEDIC SURGERY      TENDON REPAIR           Current Outpatient Medications:     ALPRAZolam (XANAX) 1 mg tablet, ALPRAZolam 1 MG Oral Tablet  Quantity: 120;  Refills: 0   , M D ;  Started 25-June-2015 Active, Disp: , Rfl:     celecoxib (CeleBREX) 200 mg capsule, take 1 capsule by mouth once daily, Disp: 30 capsule, Rfl: 2    cloNIDine (CATAPRES-TTS-1) 0 1 mg/24 hr, APPLY 1 PATCH ON THE SKIN ONCE A WEEK, Disp: 4 patch, Rfl: 5    diclofenac sodium (VOLTAREN) 1 %, Apply 2 g topically 4 (four) times a day, Disp: 100 g, Rfl: 5    ergocalciferol (VITAMIN D2) 50,000 units, take 1 capsule by mouth every week, Disp: 4 capsule, Rfl: 2    fluticasone (FLONASE) 50 mcg/act nasal spray, 2 sprays into each nostril daily, Disp: 1 Bottle, Rfl: 3    gabapentin (NEURONTIN) 600 MG tablet, Take 1,200 mg by mouth 2 (two) times a day, Disp: , Rfl:     Lurasidone HCl (Latuda) 60 MG TABS, Latuda 60 mg tablet, Disp: , Rfl:     nicotine (NICODERM CQ) 21 mg/24 hr TD 24 hr patch, Place 1 patch on the skin every 24 hours, Disp: 28 patch, Rfl: 2    nicotine polacrilex (NICORETTE) 4 mg gum, , Disp: , Rfl:     omeprazole (PriLOSEC) 20 mg delayed release capsule, Take 1 capsule (20 mg total) by mouth daily Omeprazole 20 MG Oral Capsule Delayed Release take 1 capsule by mouth once daily  Quantity: 30;  Refills: 1    Ewa Stevens PAC;  Started 2-Oct-2015 Active, Disp: 90 capsule, Rfl: 1    omeprazole (PriLOSEC) 20 mg delayed release capsule, Take 1 capsule (20 mg total) by mouth 2 (two) times a day (Patient not taking: Reported on 2/12/2021), Disp: 28 capsule, Rfl: 0    tiZANidine (ZANAFLEX) 2 mg tablet, Take 1 tablet (2 mg total) by mouth every 8 (eight) hours as needed for muscle spasms, Disp: 90 tablet, Rfl: 1    topiramate (TOPAMAX) 25 mg tablet, take 1 tablet by mouth once daily, Disp: 30 tablet, Rfl: 3    traZODone (DESYREL) 100 mg tablet, TraZODone HCl - 100 MG Oral Tablet  Quantity: 30;  Refills: 0   , M D ;  Started 17-Apr-2015 Active, Disp: , Rfl:     Current Facility-Administered Medications:     bupivacaine (PF) (MARCAINE) 0 25 % injection 10 mL, 10 mL, Intra-articular, Once, Janell Halsted, DO    iohexol (OMNIPAQUE) 300 mg/mL injection 50 mL, 50 mL, Intra-articular, Once, Janell Halsted, DO    lidocaine (PF) (XYLOCAINE-MPF) 1 % injection 5 mL, 5 mL, Infiltration, Once, Janell Halsted, DO    methylPREDNISolone acetate (DEPO-MEDROL) injection 80 mg, 80 mg, Intra-articular, Once, Janell Halsted, DO    No Known Allergies    Physical Exam: There were no vitals filed for this visit    General: Awake, Alert, Oriented x 3, Mood and affect appropriate  Respiratory: Respirations even and unlabored  Cardiovascular: Peripheral pulses intact; no edema  Musculoskeletal Exam:  Tenderness to palpation right-sided lumbar paraspinals and distal to PSIS    ASA Score: 2   Patient has been made explicitly aware that the injection will consist of steroid which may cause a temporary suppression in immune system activity  This, in turn, may increase the patient's risk of patricia corona virus  He was advised to continue with social distancing and self quarantine  Patient wishes to proceed with the injection       Assessment:   1  Sacroiliitis ([address] )    2   Piriformis syndrome of right side        Plan: Right SIJ and right piriformis inj

## 2021-03-08 ENCOUNTER — OFFICE VISIT (OUTPATIENT)
Dept: FAMILY MEDICINE CLINIC | Facility: CLINIC | Age: 51
End: 2021-03-08

## 2021-03-08 VITALS
HEART RATE: 58 BPM | BODY MASS INDEX: 33.99 KG/M2 | WEIGHT: 180 LBS | HEIGHT: 61 IN | SYSTOLIC BLOOD PRESSURE: 140 MMHG | DIASTOLIC BLOOD PRESSURE: 100 MMHG | TEMPERATURE: 98 F | RESPIRATION RATE: 16 BRPM | OXYGEN SATURATION: 99 %

## 2021-03-08 DIAGNOSIS — L50.1 IDIOPATHIC URTICARIA: Primary | ICD-10-CM

## 2021-03-08 DIAGNOSIS — R42 DIZZINESS: ICD-10-CM

## 2021-03-08 PROCEDURE — 3080F DIAST BP >= 90 MM HG: CPT | Performed by: FAMILY MEDICINE

## 2021-03-08 PROCEDURE — 3008F BODY MASS INDEX DOCD: CPT | Performed by: PHYSICAL MEDICINE & REHABILITATION

## 2021-03-08 PROCEDURE — 99213 OFFICE O/P EST LOW 20 MIN: CPT | Performed by: FAMILY MEDICINE

## 2021-03-08 PROCEDURE — 4004F PT TOBACCO SCREEN RCVD TLK: CPT | Performed by: FAMILY MEDICINE

## 2021-03-08 PROCEDURE — 3077F SYST BP >= 140 MM HG: CPT | Performed by: FAMILY MEDICINE

## 2021-03-08 PROCEDURE — 3008F BODY MASS INDEX DOCD: CPT | Performed by: FAMILY MEDICINE

## 2021-03-08 RX ORDER — LORATADINE 10 MG/1
10 TABLET ORAL 2 TIMES DAILY
Qty: 60 TABLET | Refills: 3 | Status: SHIPPED | OUTPATIENT
Start: 2021-03-08 | End: 2021-11-15

## 2021-03-08 RX ORDER — MECLIZINE HYDROCHLORIDE 25 MG/1
25 TABLET ORAL EVERY 8 HOURS PRN
Qty: 30 TABLET | Refills: 0 | Status: SHIPPED | OUTPATIENT
Start: 2021-03-08 | End: 2021-03-16 | Stop reason: SDUPTHER

## 2021-03-08 RX ORDER — ALPRAZOLAM 0.5 MG/1
0.5 TABLET ORAL 3 TIMES DAILY PRN
COMMUNITY
Start: 2021-02-27 | End: 2021-04-12 | Stop reason: SDUPTHER

## 2021-03-08 NOTE — PROGRESS NOTES
Assessment/Plan:    Idiopathic urticaria  - No raised lesions suggestive of hives were seen on physical examination however by history it is possible that this is what patient may have been experiencing  Will trial patient on Claritin 10 mg BID, no indication for allergy testing at this time in the absence of specific triggers based on choosing wisely campaign  Dizziness  - No abnormalities on physical exam and negative Roddie Degree although patient did experiencing reproducible dizziness when turning to the right side  Patient did have positive orthostatic vitals signs from lying to sitting  Patient advised to keep hydrated and may also use compression stockings  Will also trial patient on Meclizine  Diagnoses and all orders for this visit:    Idiopathic urticaria  -     loratadine (CLARITIN) 10 mg tablet; Take 1 tablet (10 mg total) by mouth 2 (two) times a day    Dizziness  -     meclizine (ANTIVERT) 25 mg tablet; Take 1 tablet (25 mg total) by mouth every 8 (eight) hours as needed for dizziness    Other orders  -     ALPRAZolam (XANAX) 0 5 mg tablet; Take 0 5 mg by mouth 3 (three) times a day as needed          Subjective:      Patient ID: John Santiago is a 44 y o  female  HPI  John Santiago is a 44year old female who presents today for a same-day visit  Today patient is concerned that she may be allergic to something  For the past few months she has noticed that she breaks into "bumps" which are incredibly itchy and scratches at continuously whenever she gets them  These lesions then spontaneously resolve  She cannot recall any specific trigger such as changes in soaps, detergents, foods etc  Patient also states that she has been experiencing intermittent episodes of dizziness whenever she bends down to  an object  She denies any head trauma recent ear infection or ringing of the ears  Review of Systems   Constitutional: Negative  HENT: Negative  Eyes: Negative      Respiratory: Negative  Cardiovascular: Negative  Gastrointestinal: Negative  Genitourinary: Negative  Musculoskeletal: Negative  Skin: Positive for rash  Neurological: Positive for dizziness  Psychiatric/Behavioral: Negative  Objective:      /100 (BP Location: Left arm, Patient Position: Sitting, Cuff Size: Large)   Pulse 58   Temp 98 °F (36 7 °C) (Temporal)   Resp 16   Ht 5' 1" (1 549 m)   Wt 81 6 kg (180 lb)   LMP 05/10/2016   SpO2 99%   BMI 34 01 kg/m²          Physical Exam  Constitutional:       General: She is not in acute distress  Appearance: Normal appearance  She is obese  She is not ill-appearing  HENT:      Head: Normocephalic and atraumatic  Mouth/Throat:      Mouth: Mucous membranes are moist    Eyes:      General:         Right eye: No discharge  Left eye: No discharge  Extraocular Movements: Extraocular movements intact  Pupils: Pupils are equal, round, and reactive to light  Neck:      Musculoskeletal: Normal range of motion  Cardiovascular:      Rate and Rhythm: Normal rate  Pulses: Normal pulses  Pulmonary:      Effort: Pulmonary effort is normal  No respiratory distress  Breath sounds: No wheezing  Abdominal:      Palpations: Abdomen is soft  Tenderness: There is no abdominal tenderness  Musculoskeletal: Normal range of motion  Skin:     Comments: No raised lesions noted but scattered areas of excoriation and hyperpigmentation seen on the back  Neurological:      General: No focal deficit present  Mental Status: She is alert and oriented to person, place, and time  Cranial Nerves: No cranial nerve deficit  Sensory: No sensory deficit  Motor: No weakness  Coordination: Coordination normal       Deep Tendon Reflexes: Reflexes normal       Comments: Carroll-Hallpike maneuver performed which was negative however patient did experience reproducible dizziness when turned to the right side

## 2021-03-09 PROBLEM — L50.1 IDIOPATHIC URTICARIA: Status: ACTIVE | Noted: 2021-03-09

## 2021-03-09 PROBLEM — R42 DIZZINESS: Status: ACTIVE | Noted: 2021-03-09

## 2021-03-09 NOTE — ASSESSMENT & PLAN NOTE
- No abnormalities on physical exam and negative Fayrene Axon although patient did experiencing reproducible dizziness when turning to the right side  Patient did have positive orthostatic vitals signs from lying to sitting  Patient advised to keep hydrated and may also use compression stockings  Will also trial patient on Meclizine

## 2021-03-09 NOTE — ASSESSMENT & PLAN NOTE
- No raised lesions suggestive of hives were seen on physical examination however by history it is possible that this is what patient may have been experiencing  Will trial patient on Claritin 10 mg BID, no indication for allergy testing at this time in the absence of specific triggers based on choosing wisely campaign

## 2021-03-10 DIAGNOSIS — E55.9 VITAMIN D DEFICIENCY: ICD-10-CM

## 2021-03-12 ENCOUNTER — TELEPHONE (OUTPATIENT)
Dept: PAIN MEDICINE | Facility: CLINIC | Age: 51
End: 2021-03-12

## 2021-03-12 DIAGNOSIS — M51.36 LUMBAR DEGENERATIVE DISC DISEASE: ICD-10-CM

## 2021-03-12 RX ORDER — CELECOXIB 200 MG/1
CAPSULE ORAL
Qty: 30 CAPSULE | Refills: 2 | OUTPATIENT
Start: 2021-03-12

## 2021-03-12 RX ORDER — TIZANIDINE 2 MG/1
2 TABLET ORAL EVERY 8 HOURS PRN
Qty: 90 TABLET | Refills: 1 | Status: SHIPPED | OUTPATIENT
Start: 2021-03-12 | End: 2021-12-10

## 2021-03-12 RX ORDER — ERGOCALCIFEROL 1.25 MG/1
CAPSULE ORAL
Qty: 4 CAPSULE | Refills: 2 | Status: SHIPPED | OUTPATIENT
Start: 2021-03-12 | End: 2021-05-28

## 2021-03-12 NOTE — TELEPHONE ENCOUNTER
When last here BP was really high  I dont recommend celebrex  Any anti-inflammatory can actually increase her blood pressure more

## 2021-03-14 DIAGNOSIS — R42 DIZZINESS: ICD-10-CM

## 2021-03-15 NOTE — TELEPHONE ENCOUNTER
Can you please ask patient if she has been taking the Meclizine and if so has it been helping?  Thank you

## 2021-03-16 DIAGNOSIS — R42 DIZZINESS: ICD-10-CM

## 2021-03-16 RX ORDER — MECLIZINE HYDROCHLORIDE 25 MG/1
25 TABLET ORAL EVERY 8 HOURS PRN
Qty: 30 TABLET | Refills: 0 | Status: SHIPPED | OUTPATIENT
Start: 2021-03-16 | End: 2021-11-15

## 2021-03-16 RX ORDER — MECLIZINE HYDROCHLORIDE 25 MG/1
TABLET ORAL
Qty: 30 TABLET | Refills: 0 | Status: SHIPPED | OUTPATIENT
Start: 2021-03-16 | End: 2021-04-07

## 2021-03-16 NOTE — TELEPHONE ENCOUNTER
Nida Damon,     I saw your patient for a same day visit and she was complaining of dizziness which seemed to be positional  I started her on Meclizine and she says it has been helping her       Thanks,     Monique Keene

## 2021-04-02 ENCOUNTER — TELEPHONE (OUTPATIENT)
Dept: FAMILY MEDICINE CLINIC | Facility: CLINIC | Age: 51
End: 2021-04-02

## 2021-04-02 DIAGNOSIS — F31.70 BIPOLAR AFFECTIVE DISORDER IN REMISSION (HCC): Primary | ICD-10-CM

## 2021-04-02 NOTE — TELEPHONE ENCOUNTER
She gets all these meds from psych   Can you find out if there is a problem with her psychiatrist  If she says there is no doctor then you need to call their office and make sure that is true

## 2021-04-02 NOTE — TELEPHONE ENCOUNTER
Pt called stating that [facility]/Psych doctor is on vacation for over a month and she was informed to ask for refill for meds through us until his reuturn  ALPRAZolam (XANAX) 0 5 mg tablet    traZODone (DESYREL) 100 mg tablet    gabapentin (NEURONTIN) 600 MG tablet     Lurasidone HCl (Latuda) 60 MG TABS       Please advice if this is possible     Correct pharmacy on file

## 2021-04-06 DIAGNOSIS — I10 ESSENTIAL HYPERTENSION: ICD-10-CM

## 2021-04-06 RX ORDER — CLONIDINE 0.1 MG/24H
PATCH, EXTENDED RELEASE TRANSDERMAL
Qty: 4 PATCH | Refills: 5 | Status: SHIPPED | OUTPATIENT
Start: 2021-04-06 | End: 2022-03-01

## 2021-04-07 DIAGNOSIS — R42 DIZZINESS: ICD-10-CM

## 2021-04-07 RX ORDER — GABAPENTIN 600 MG/1
1200 TABLET ORAL 2 TIMES DAILY
Qty: 60 TABLET | Refills: 0 | Status: SHIPPED | OUTPATIENT
Start: 2021-04-07 | End: 2021-04-19

## 2021-04-07 RX ORDER — ALPRAZOLAM 0.5 MG/1
0.5 TABLET ORAL 3 TIMES DAILY PRN
Qty: 45 TABLET | Refills: 0 | Status: SHIPPED | OUTPATIENT
Start: 2021-04-07 | End: 2021-11-15

## 2021-04-07 RX ORDER — MECLIZINE HYDROCHLORIDE 25 MG/1
TABLET ORAL
Qty: 30 TABLET | Refills: 0 | Status: SHIPPED | OUTPATIENT
Start: 2021-04-07 | End: 2021-04-19

## 2021-04-07 RX ORDER — LURASIDONE HYDROCHLORIDE 60 MG/1
1 TABLET, FILM COATED ORAL DAILY
Qty: 15 TABLET | Refills: 0 | Status: SHIPPED | OUTPATIENT
Start: 2021-04-07

## 2021-04-07 RX ORDER — TRAZODONE HYDROCHLORIDE 100 MG/1
100 TABLET ORAL
Qty: 15 TABLET | Refills: 0 | Status: SHIPPED | OUTPATIENT
Start: 2021-04-07 | End: 2021-04-12 | Stop reason: SDUPTHER

## 2021-04-09 ENCOUNTER — TELEPHONE (OUTPATIENT)
Dept: FAMILY MEDICINE CLINIC | Facility: CLINIC | Age: 51
End: 2021-04-09

## 2021-04-09 NOTE — TELEPHONE ENCOUNTER
Patient called requesting her refill on     ALPRAZolam (XANAX) 0 5 mg tablet      traZODone (DESYREL) 100 mg tablet     Patient states the pharmacy has not received the prescription   I personally called pharmacy and they said they have not received anything    Please advice

## 2021-04-12 ENCOUNTER — TELEPHONE (OUTPATIENT)
Dept: PAIN MEDICINE | Facility: MEDICAL CENTER | Age: 51
End: 2021-04-12

## 2021-04-12 DIAGNOSIS — F31.70 BIPOLAR AFFECTIVE DISORDER IN REMISSION (HCC): ICD-10-CM

## 2021-04-12 RX ORDER — ALPRAZOLAM 0.5 MG/1
0.5 TABLET ORAL 3 TIMES DAILY PRN
Qty: 45 TABLET | Refills: 0 | Status: SHIPPED | OUTPATIENT
Start: 2021-04-12

## 2021-04-12 RX ORDER — TRAZODONE HYDROCHLORIDE 100 MG/1
100 TABLET ORAL
Qty: 15 TABLET | Refills: 0 | Status: SHIPPED | OUTPATIENT
Start: 2021-04-12 | End: 2021-04-13 | Stop reason: SDUPTHER

## 2021-04-13 ENCOUNTER — TELEPHONE (OUTPATIENT)
Dept: FAMILY MEDICINE CLINIC | Facility: CLINIC | Age: 51
End: 2021-04-13

## 2021-04-13 DIAGNOSIS — F31.70 BIPOLAR AFFECTIVE DISORDER IN REMISSION (HCC): ICD-10-CM

## 2021-04-13 RX ORDER — TRAZODONE HYDROCHLORIDE 100 MG/1
100 TABLET ORAL
Qty: 15 TABLET | Refills: 0 | Status: SHIPPED | OUTPATIENT
Start: 2021-04-13

## 2021-04-13 NOTE — TELEPHONE ENCOUNTER
Pharmacy called stating the trazadone that was sent over states qty of 15  But in the [facility] states qty of 30  Asking if you can send a new rx over with clairifcation   Thank you

## 2021-04-17 DIAGNOSIS — F31.70 BIPOLAR AFFECTIVE DISORDER IN REMISSION (HCC): ICD-10-CM

## 2021-04-17 DIAGNOSIS — R42 DIZZINESS: ICD-10-CM

## 2021-04-18 DIAGNOSIS — F31.70 BIPOLAR AFFECTIVE DISORDER IN REMISSION (HCC): ICD-10-CM

## 2021-04-19 ENCOUNTER — OFFICE VISIT (OUTPATIENT)
Dept: BARIATRICS | Facility: CLINIC | Age: 51
End: 2021-04-19
Payer: COMMERCIAL

## 2021-04-19 VITALS
BODY MASS INDEX: 32.02 KG/M2 | SYSTOLIC BLOOD PRESSURE: 150 MMHG | TEMPERATURE: 97.6 F | HEIGHT: 62 IN | HEART RATE: 61 BPM | DIASTOLIC BLOOD PRESSURE: 100 MMHG | WEIGHT: 174 LBS

## 2021-04-19 DIAGNOSIS — Z72.0 TOBACCO USE: ICD-10-CM

## 2021-04-19 DIAGNOSIS — E66.9 OBESITY (BMI 30.0-34.9): Primary | ICD-10-CM

## 2021-04-19 DIAGNOSIS — K21.9 GASTROESOPHAGEAL REFLUX DISEASE WITHOUT ESOPHAGITIS: ICD-10-CM

## 2021-04-19 DIAGNOSIS — F31.11 BIPOLAR AFFECTIVE DISORDER, CURRENTLY MANIC, MILD (HCC): ICD-10-CM

## 2021-04-19 DIAGNOSIS — E66.09 CLASS 1 OBESITY DUE TO EXCESS CALORIES WITH SERIOUS COMORBIDITY AND BODY MASS INDEX (BMI) OF 32.0 TO 32.9 IN ADULT: ICD-10-CM

## 2021-04-19 DIAGNOSIS — I10 ESSENTIAL HYPERTENSION: ICD-10-CM

## 2021-04-19 DIAGNOSIS — G43.909 MIGRAINES: ICD-10-CM

## 2021-04-19 PROCEDURE — 99244 OFF/OP CNSLTJ NEW/EST MOD 40: CPT | Performed by: PHYSICIAN ASSISTANT

## 2021-04-19 PROCEDURE — 4004F PT TOBACCO SCREEN RCVD TLK: CPT | Performed by: PHYSICIAN ASSISTANT

## 2021-04-19 PROCEDURE — 3077F SYST BP >= 140 MM HG: CPT | Performed by: PHYSICIAN ASSISTANT

## 2021-04-19 PROCEDURE — 3008F BODY MASS INDEX DOCD: CPT | Performed by: PHYSICIAN ASSISTANT

## 2021-04-19 PROCEDURE — 3080F DIAST BP >= 90 MM HG: CPT | Performed by: PHYSICIAN ASSISTANT

## 2021-04-19 PROCEDURE — 3008F BODY MASS INDEX DOCD: CPT | Performed by: FAMILY MEDICINE

## 2021-04-19 RX ORDER — LURASIDONE HYDROCHLORIDE 60 MG/1
TABLET, FILM COATED ORAL
Qty: 15 TABLET | Refills: 0 | OUTPATIENT
Start: 2021-04-19

## 2021-04-19 RX ORDER — MECLIZINE HYDROCHLORIDE 25 MG/1
TABLET ORAL
Qty: 30 TABLET | Refills: 0 | Status: SHIPPED | OUTPATIENT
Start: 2021-04-19 | End: 2021-04-29

## 2021-04-19 RX ORDER — GABAPENTIN 600 MG/1
TABLET ORAL
Qty: 60 TABLET | Refills: 0 | Status: SHIPPED | OUTPATIENT
Start: 2021-04-19 | End: 2021-05-03

## 2021-04-19 NOTE — ASSESSMENT & PLAN NOTE
Trying to quit   recommended patient consider Wellbutrin for weight loss, bipolar and smoking cessation

## 2021-04-19 NOTE — PROGRESS NOTES
Assessment/Plan:    Obesity (BMI 30 0-34 9)  -Discussed options of HealthyCORE-Intensive Lifestyle Intervention Program, Very Low Calorie Diet-VLCD and Conservative Program and the role of weight loss medications   -Initial weight loss goal of 5-10% weight loss for improved health  -Screening labs  Recommend checking lab coverage before having labs drawn  -NEEDS Lipids, tsh, a1c, cmp, fasting insulin  - STOP BANG-3/8  -Patient is interested in pursuing Conservative Program   -Discussed gabapentin is a weight gaining medication    Goals:  Food log (ie ) www myfitnesspal com,sparkpeople  com,loseit com,calorieking  com,etc  baritastic-weigh and measure food   No sugary beverages  At least 64oz of water daily  -cut out calorie beverages   Increase physical activity by 10 minutes daily  Gradually increase physical activity to a goal of 5 days per week for 30 minutes of MODERATE intensity PLUS 2 days per week of FULL BODY resistance training -3-4 days of aqua therapy/aerobics   3916-2927 calories per day  Talk top psych about wellbutrin for smoking cessation     Bipolar disorder ([facility] [address] )  Taking xanax and latuda   -continue management with prescribing provider      Migraines  Taking topamax  -continue management with prescribing provider      Gastroesophageal reflux disease without esophagitis  Taking prilosec  -should improve with weight loss, dietary, and lifestyle changes  -continue management with prescribing provider      HTN (hypertension)  Using clonidone patch  -should improve with weight loss, dietary, and lifestyle changes  -continue management with prescribing provider  -patient to monitor bp at home and if consistently above 140/90 needs to follow up with pcp  -discussed if patient develops shortness of breath, chest pain, lightheadedness, dizziness, blurred vision, severe headache or extremity weakness needs to report to the ER immediately       Tobacco use  Trying to quit   recommended patient consider Wellbutrin for weight loss, bipolar and smoking cessation        Follow up in approximately 2 months with Non-Surgical Physician/Advanced Practitioner  Diagnoses and all orders for this visit:    Obesity (BMI 30 0-34  9)    Class 1 obesity due to excess calories with serious comorbidity and body mass index (BMI) of 32 0 to 32 9 in adult  -     Ambulatory referral to Weight Management    Essential hypertension  -     Ambulatory referral to Weight Management    Bipolar affective disorder, currently manic, mild (HCC)    Migraines    Gastroesophageal reflux disease without esophagitis    Tobacco use          Subjective:   Chief Complaint   Patient presents with    Consult     mwm consult       Patient ID: Kiera Beaulieu  is a 44 y o  female with excess weight/obesity here to pursue weight management  Past Medical History:   Diagnosis Date    Anxiety     Arthritis     Chronic neck and back pain     Class 1 obesity due to excess calories with serious comorbidity and body mass index (BMI) of 32 0 to 32 9 in adult 3/13/2020    Depression     GERD (gastroesophageal reflux disease)     Hypertension     PTSD (post-traumatic stress disorder)        HPI:  Obesity/Excess Weight:  Severity: Moderate  Onset: For the last 4-5 years     Modifiers: Diet and Exercise  Contributing factors: Stress/Emotional Eating, Menopause and portion size   Associated symptoms: increased joint pain, decreased self esteem and depression    Goals: 140-145 lbs   Hydration: 24 oz of water, 1-2 cups of juice, 3 cups of iced tea, 2 liter per week, 3-4 cups of coffee with sugar and cream per week    Alcohol: 3 drinks per month     Colonoscopy-Completed 06/09/2020      The following portions of the patient's history were reviewed and updated as appropriate: allergies, current medications, past family history, past medical history, past social history, past surgical history and problem list     Review of Systems   HENT: Negative for sore throat  Respiratory: Negative for cough and shortness of breath  Cardiovascular: Negative for chest pain and palpitations  Gastrointestinal: Negative for abdominal pain, constipation, diarrhea, nausea and vomiting         + GERD-controlled with meds    Endocrine: Negative for cold intolerance and heat intolerance  Genitourinary: Negative for dysuria  Musculoskeletal: Positive for arthralgias (knees) and back pain  Skin: Negative for rash  Neurological: Positive for headaches (taking topamax )  Psychiatric/Behavioral: Negative for suicidal ideas (denies HI)  + Depression and anxiety-controlled        Objective:    /100 (BP Location: Left arm, Patient Position: Sitting, Cuff Size: Large)   Pulse 61   Temp 97 6 °F (36 4 °C)   Ht 5' 2" (1 575 m)   Wt 78 9 kg (174 lb)   LMP 05/10/2016   BMI 31 83 kg/m²     Physical Exam  Vitals signs and nursing note reviewed  Constitutional   General appearance: Abnormal   well developed and obese  Eyes No conjunctival pallor  Ears, Nose, Mouth, and Throat Bilateral external ears-no discharge, edema, or erythema    Pulmonary   Respiratory effort: No increased work of breathing or signs of respiratory distress  Auscultation of lungs: Clear to auscultation, equal breath sounds bilaterally, no wheezes, no rales, no rhonci  Cardiovascular   Auscultation of heart: Normal rate and rhythm, normal S1 and S2, without murmurs  Examination of extremities for edema and/or varicosities: Normal   no edema  Abdomen   Abdomen: Abnormal   The abdomen was obese  Bowel sounds were normal  The abdomen was soft and nontender     Musculoskeletal   Gait and station: Normal     Psychiatric   Orientation to person, place and time: Normal     Affect: appropriate

## 2021-04-19 NOTE — ASSESSMENT & PLAN NOTE
-Discussed options of HealthyCORE-Intensive Lifestyle Intervention Program, Very Low Calorie Diet-VLCD and Conservative Program and the role of weight loss medications   -Initial weight loss goal of 5-10% weight loss for improved health  -Screening labs  Recommend checking lab coverage before having labs drawn  -NEEDS Lipids, tsh, a1c, cmp, fasting insulin  - STOP BANG-3/8  -Patient is interested in pursuing Conservative Program   -Discussed gabapentin is a weight gaining medication    Goals:  Food log (ie ) www myfitnesspal com,sparkpeople  com,loseit com,calorieking  com,etc  baritastic-weigh and measure food   No sugary beverages  At least 64oz of water daily  -cut out calorie beverages   Increase physical activity by 10 minutes daily   Gradually increase physical activity to a goal of 5 days per week for 30 minutes of MODERATE intensity PLUS 2 days per week of FULL BODY resistance training -3-4 days of aqua therapy/aerobics   [id number] calories per day  Talk top psych about wellbutrin for smoking cessation

## 2021-04-19 NOTE — ASSESSMENT & PLAN NOTE
Using clonidone patch  -should improve with weight loss, dietary, and lifestyle changes  -continue management with prescribing provider  -patient to monitor bp at home and if consistently above 140/90 needs to follow up with pcp  -discussed if patient develops shortness of breath, chest pain, lightheadedness, dizziness, blurred vision, severe headache or extremity weakness needs to report to the ER immediately

## 2021-04-22 DIAGNOSIS — F31.70 BIPOLAR AFFECTIVE DISORDER IN REMISSION (HCC): ICD-10-CM

## 2021-04-22 RX ORDER — ALPRAZOLAM 0.5 MG/1
TABLET ORAL
Qty: 45 TABLET | OUTPATIENT
Start: 2021-04-22

## 2021-04-22 NOTE — TELEPHONE ENCOUNTER
I didn't not refill psych meds because they had said her psychiatrist was coming back on the 19th so she needs to call them

## 2021-04-23 DIAGNOSIS — F31.70 BIPOLAR AFFECTIVE DISORDER IN REMISSION (HCC): ICD-10-CM

## 2021-04-23 RX ORDER — TRAZODONE HYDROCHLORIDE 100 MG/1
TABLET ORAL
Qty: 15 TABLET | Refills: 0 | OUTPATIENT
Start: 2021-04-23

## 2021-04-29 DIAGNOSIS — R42 DIZZINESS: ICD-10-CM

## 2021-04-29 RX ORDER — MECLIZINE HYDROCHLORIDE 25 MG/1
TABLET ORAL
Qty: 30 TABLET | Refills: 0 | Status: SHIPPED | OUTPATIENT
Start: 2021-04-29 | End: 2021-05-13

## 2021-05-03 DIAGNOSIS — F31.70 BIPOLAR AFFECTIVE DISORDER IN REMISSION (HCC): ICD-10-CM

## 2021-05-03 RX ORDER — GABAPENTIN 600 MG/1
TABLET ORAL
Qty: 60 TABLET | Refills: 0 | Status: SHIPPED | OUTPATIENT
Start: 2021-05-03 | End: 2021-05-19

## 2021-05-05 DIAGNOSIS — G43.709 CHRONIC MIGRAINE WITHOUT AURA WITHOUT STATUS MIGRAINOSUS, NOT INTRACTABLE: ICD-10-CM

## 2021-05-05 RX ORDER — TOPIRAMATE 25 MG/1
TABLET ORAL
Qty: 30 TABLET | Refills: 0 | Status: SHIPPED | OUTPATIENT
Start: 2021-05-05 | End: 2021-06-03

## 2021-05-11 DIAGNOSIS — R42 DIZZINESS: ICD-10-CM

## 2021-05-13 RX ORDER — MECLIZINE HYDROCHLORIDE 25 MG/1
TABLET ORAL
Qty: 30 TABLET | Refills: 0 | Status: SHIPPED | OUTPATIENT
Start: 2021-05-13 | End: 2021-06-01

## 2021-05-17 ENCOUNTER — OFFICE VISIT (OUTPATIENT)
Dept: OBGYN CLINIC | Facility: MEDICAL CENTER | Age: 51
End: 2021-05-17
Payer: COMMERCIAL

## 2021-05-17 ENCOUNTER — APPOINTMENT (OUTPATIENT)
Dept: RADIOLOGY | Age: 51
End: 2021-05-17
Payer: COMMERCIAL

## 2021-05-17 VITALS
BODY MASS INDEX: 32.1 KG/M2 | SYSTOLIC BLOOD PRESSURE: 120 MMHG | HEART RATE: 66 BPM | WEIGHT: 170 LBS | DIASTOLIC BLOOD PRESSURE: 67 MMHG | HEIGHT: 61 IN

## 2021-05-17 DIAGNOSIS — M17.0 PRIMARY OSTEOARTHRITIS OF BOTH KNEES: ICD-10-CM

## 2021-05-17 DIAGNOSIS — G89.29 CHRONIC PAIN OF BOTH KNEES: Primary | ICD-10-CM

## 2021-05-17 DIAGNOSIS — M25.562 CHRONIC PAIN OF BOTH KNEES: ICD-10-CM

## 2021-05-17 DIAGNOSIS — M25.561 CHRONIC PAIN OF BOTH KNEES: Primary | ICD-10-CM

## 2021-05-17 DIAGNOSIS — G89.29 CHRONIC PAIN OF BOTH KNEES: ICD-10-CM

## 2021-05-17 DIAGNOSIS — M25.561 CHRONIC PAIN OF BOTH KNEES: ICD-10-CM

## 2021-05-17 DIAGNOSIS — M25.562 CHRONIC PAIN OF BOTH KNEES: Primary | ICD-10-CM

## 2021-05-17 PROCEDURE — 99213 OFFICE O/P EST LOW 20 MIN: CPT | Performed by: EMERGENCY MEDICINE

## 2021-05-17 NOTE — PROGRESS NOTES
Assessment/Plan:    Diagnoses and all orders for this visit:    Chronic pain of both knees  -     MRI knee left  wo contrast; Future  -     Ambulatory referral to Orthopedic Surgery; Future  -     Cancel: XR knee 3 vw left non injury; Future  -     Cancel: XR knee 3 vw right non injury; Future  -     XR knee 3 vw right non injury; Future  -     XR knee 3 vw left non injury; Future    Primary osteoarthritis of both knees  -     MRI knee left  wo contrast; Future  -     Ambulatory referral to Orthopedic Surgery; Future  -     Cancel: XR knee 3 vw left non injury; Future  -     Cancel: XR knee 3 vw right non injury; Future  -     XR knee 3 vw right non injury; Future  -     XR knee 3 vw left non injury; Future      Patient would like to meet with the orthopedic surgeon to discuss operative intervention given her chronic knee pain  Patient has undergone steroid and viscosupplementation injections with minimal to mild improvement  She has participated in physical therapy in the past as well and taken medications for her pain  MRI of the left knee to evaluate for instability of the knee with a positive Lachman's test as well as to evaluate osteoarthritis  We have obtained updated Xrays b/l knees with Dr Elzbieta Garcia protocol  Return if symptoms worsen or fail to improve  Chief Complaint:     Chief Complaint   Patient presents with    Right Knee - Pain    Left Knee - Pain       Subjective:   Patient ID: Estrella Sykes is a 44 y o  female  Patient returns for bilateral knee pain and osteoarthritis left greater than right she states she has had no significant benefit from prior course cook steroid injections and viscosupplementation injections  She is very frustrated in feeling down due to her pain and limitations  Patient notes her knee keeps hyper extending and gives out on her        Review of Systems    The following portions of the patient's chart were reviewed and updated as appropriate: Allergy:  No Known Allergies      Past Medical History:   Diagnosis Date    Anxiety     Arthritis     Bipolar 1 disorder (HCC)     Chronic neck and back pain     Class 1 obesity due to excess calories with serious comorbidity and body mass index (BMI) of 32 0 to 32 9 in adult 3/13/2020    Depression     Dizziness     GERD (gastroesophageal reflux disease)     Headache     Hypertension     Joint pain     and swelling    Night sweats     PTSD (post-traumatic stress disorder)        Past Surgical History:   Procedure Laterality Date    FACIAL FRACTURE SURGERY      FRACTURE SURGERY      ORTHOPEDIC SURGERY      TENDON REPAIR         Social History     Socioeconomic History    Marital status: Single     Spouse name: Not on file    Number of children: Not on file    Years of education: Not on file    Highest education level: Not on file   Occupational History    Not on file   Social Needs    Financial resource strain: Not on file    Food insecurity     Worry: Not on file     Inability: Not on file    Transportation needs     Medical: Not on file     Non-medical: Not on file   Tobacco Use    Smoking status: Light Tobacco Smoker     Packs/day: 0 25    Smokeless tobacco: Never Used   Substance and Sexual Activity    Alcohol use: Yes     Comment: social    Drug use: Yes     Types: Marijuana    Sexual activity: Yes     Partners: Male     Birth control/protection: None   Lifestyle    Physical activity     Days per week: Not on file     Minutes per session: Not on file    Stress: Not on file   Relationships    Social connections     Talks on phone: Not on file     Gets together: Not on file     Attends Anabaptism service: Not on file     Active member of club or organization: Not on file     Attends meetings of clubs or organizations: Not on file     Relationship status: Not on file    Intimate partner violence     Fear of current or ex partner: Not on file     Emotionally abused: Not on file Physically abused: Not on file     Forced sexual activity: Not on file   Other Topics Concern    Not on file   Social History Narrative    Not on file       Family History   Problem Relation Age of Onset    Diabetes Mother     Hypertension Mother     Thyroid disease Mother     No Known Problems Father     No Known Problems Sister     No Known Problems Daughter     Lung cancer Maternal Grandmother     No Known Problems Maternal Grandfather     No Known Problems Paternal Grandmother     No Known Problems Paternal Grandfather     No Known Problems Brother     Heart disease Neg Hx     Stroke Neg Hx        Medications:    Current Outpatient Medications:     ALPRAZolam (XANAX) 0 5 mg tablet, Take 1 tablet (0 5 mg total) by mouth 3 (three) times a day as needed for anxiety, Disp: 45 tablet, Rfl: 0    cloNIDine (CATAPRES-TTS-1) 0 1 mg/24 hr, APPLY 1 PATCH ON THE SKIN ONCE A WEEK, Disp: 4 patch, Rfl: 5    diclofenac sodium (VOLTAREN) 1 %, Apply 2 g topically 4 (four) times a day, Disp: 100 g, Rfl: 5    ergocalciferol (VITAMIN D2) 50,000 units, take 1 capsule by mouth every week, Disp: 4 capsule, Rfl: 2    fluticasone (FLONASE) 50 mcg/act nasal spray, 2 sprays into each nostril daily, Disp: 1 Bottle, Rfl: 3    gabapentin (NEURONTIN) 600 MG tablet, take 2 tablet by mouth twice a day, Disp: 60 tablet, Rfl: 0    loratadine (CLARITIN) 10 mg tablet, Take 1 tablet (10 mg total) by mouth 2 (two) times a day, Disp: 60 tablet, Rfl: 3    Lurasidone HCl (Latuda) 60 MG TABS, Take 1 tablet (60 mg total) by mouth daily, Disp: 15 tablet, Rfl: 0    meclizine (ANTIVERT) 25 mg tablet, Take 1 tablet (25 mg total) by mouth every 8 (eight) hours as needed for dizziness, Disp: 30 tablet, Rfl: 0    meclizine (ANTIVERT) 25 mg tablet, take 1 tablet by mouth every 8 hours if needed for dizziness, Disp: 30 tablet, Rfl: 0    nicotine (NICODERM CQ) 21 mg/24 hr TD 24 hr patch, Place 1 patch on the skin every 24 hours, Disp: 28 patch, Rfl: 2    nicotine polacrilex (NICORETTE) 4 mg gum, , Disp: , Rfl:     omeprazole (PriLOSEC) 20 mg delayed release capsule, Take 1 capsule (20 mg total) by mouth daily Omeprazole 20 MG Oral Capsule Delayed Release take 1 capsule by mouth once daily  Quantity: 30;  Refills: 1    Ewa Stevens PAC;  Started 2-Oct-2015 Active, Disp: 90 capsule, Rfl: 1    tiZANidine (ZANAFLEX) 2 mg tablet, Take 1 tablet (2 mg total) by mouth every 8 (eight) hours as needed for muscle spasms, Disp: 90 tablet, Rfl: 1    topiramate (TOPAMAX) 25 mg tablet, take 1 tablet by mouth once daily, Disp: 30 tablet, Rfl: 0    traZODone (DESYREL) 100 mg tablet, Take 1 tablet (100 mg total) by mouth daily at bedtime, Disp: 15 tablet, Rfl: 0    ALPRAZolam (XANAX) 0 5 mg tablet, Take 1 tablet (0 5 mg total) by mouth 3 (three) times a day as needed for anxiety for up to 14 days ALPRAZolam 1 MG Oral Tablet  Quantity: 120;  Refills: 0   , M D ;  Started 25-June-2015 Active, Disp: 45 tablet, Rfl: 0    celecoxib (CeleBREX) 200 mg capsule, take 1 capsule by mouth once daily (Patient not taking: Reported on 4/19/2021), Disp: 30 capsule, Rfl: 2    omeprazole (PriLOSEC) 20 mg delayed release capsule, Take 1 capsule (20 mg total) by mouth 2 (two) times a day (Patient not taking: Reported on 2/12/2021), Disp: 28 capsule, Rfl: 0    Patient Active Problem List   Diagnosis    Anxiety and depression    Bipolar disorder ([facility] [address] )    Gastroesophageal reflux disease without esophagitis    HTN (hypertension)    Low serum vitamin D    Lumbar degenerative disc disease    Migraines    Obesity (BMI 30 0-34  9)    Tobacco use    Primary osteoarthritis of left knee    Encounter for screening colonoscopy    Primary osteoarthritis of both knees    Chronic pain of both knees    Sacroiliitis (HCC)    Piriformis syndrome of right side    Idiopathic urticaria    Dizziness       Objective:  /67   Pulse 66   Ht 5' 1" (1 549 m)   Wt 77 1 kg (170 lb) LMP 05/10/2016   BMI 32 12 kg/m²     Left Knee Exam     Tenderness   Left knee tenderness location: Diffusely tender to palpation  Range of Motion   The patient has normal left knee ROM  Tests   Lachman:  Anterior - positive        Other   Erythema: absent  Swelling: mild            Physical Exam      Neurologic Exam    Procedures    I have personally reviewed the written report of the pertinent studies

## 2021-05-19 DIAGNOSIS — F31.70 BIPOLAR AFFECTIVE DISORDER IN REMISSION (HCC): ICD-10-CM

## 2021-05-19 RX ORDER — GABAPENTIN 600 MG/1
TABLET ORAL
Qty: 60 TABLET | Refills: 0 | Status: SHIPPED | OUTPATIENT
Start: 2021-05-19 | End: 2021-06-05

## 2021-05-28 DIAGNOSIS — E55.9 VITAMIN D DEFICIENCY: ICD-10-CM

## 2021-05-28 RX ORDER — ERGOCALCIFEROL 1.25 MG/1
CAPSULE ORAL
Qty: 4 CAPSULE | Refills: 2 | Status: SHIPPED | OUTPATIENT
Start: 2021-05-28 | End: 2022-07-13 | Stop reason: ALTCHOICE

## 2021-05-31 DIAGNOSIS — R42 DIZZINESS: ICD-10-CM

## 2021-06-01 RX ORDER — MECLIZINE HYDROCHLORIDE 25 MG/1
TABLET ORAL
Qty: 30 TABLET | Refills: 0 | Status: SHIPPED | OUTPATIENT
Start: 2021-06-01 | End: 2021-06-10

## 2021-06-02 DIAGNOSIS — G43.709 CHRONIC MIGRAINE WITHOUT AURA WITHOUT STATUS MIGRAINOSUS, NOT INTRACTABLE: ICD-10-CM

## 2021-06-03 RX ORDER — TOPIRAMATE 25 MG/1
TABLET ORAL
Qty: 30 TABLET | Refills: 0 | Status: SHIPPED | OUTPATIENT
Start: 2021-06-03 | End: 2021-07-08

## 2021-06-04 DIAGNOSIS — F31.70 BIPOLAR AFFECTIVE DISORDER IN REMISSION (HCC): ICD-10-CM

## 2021-06-05 RX ORDER — GABAPENTIN 600 MG/1
TABLET ORAL
Qty: 60 TABLET | Refills: 0 | Status: SHIPPED | OUTPATIENT
Start: 2021-06-05 | End: 2021-11-15

## 2021-06-07 ENCOUNTER — HOSPITAL ENCOUNTER (OUTPATIENT)
Dept: MRI IMAGING | Facility: HOSPITAL | Age: 51
Discharge: HOME | End: 2021-06-07
Attending: EMERGENCY MEDICINE
Payer: COMMERCIAL

## 2021-06-07 DIAGNOSIS — M17.0 PRIMARY OSTEOARTHRITIS OF BOTH KNEES: ICD-10-CM

## 2021-06-07 DIAGNOSIS — M25.561 CHRONIC PAIN OF BOTH KNEES: ICD-10-CM

## 2021-06-07 DIAGNOSIS — G89.29 CHRONIC PAIN OF BOTH KNEES: ICD-10-CM

## 2021-06-07 DIAGNOSIS — M25.562 CHRONIC PAIN OF BOTH KNEES: ICD-10-CM

## 2021-06-07 PROCEDURE — G1004 CDSM NDSC: HCPCS

## 2021-06-07 PROCEDURE — 73721 MRI JNT OF LWR EXTRE W/O DYE: CPT

## 2021-06-10 DIAGNOSIS — R42 DIZZINESS: ICD-10-CM

## 2021-06-10 RX ORDER — MECLIZINE HYDROCHLORIDE 25 MG/1
TABLET ORAL
Qty: 30 TABLET | Refills: 0 | Status: SHIPPED | OUTPATIENT
Start: 2021-06-10 | End: 2021-06-21

## 2021-06-14 ENCOUNTER — OFFICE VISIT (OUTPATIENT)
Dept: OBGYN CLINIC | Facility: MEDICAL CENTER | Age: 51
End: 2021-06-14
Payer: COMMERCIAL

## 2021-06-14 VITALS
TEMPERATURE: 97.8 F | HEIGHT: 61 IN | SYSTOLIC BLOOD PRESSURE: 130 MMHG | BODY MASS INDEX: 32.1 KG/M2 | DIASTOLIC BLOOD PRESSURE: 79 MMHG | HEART RATE: 84 BPM | WEIGHT: 170 LBS

## 2021-06-14 DIAGNOSIS — G89.29 CHRONIC PAIN OF BOTH KNEES: ICD-10-CM

## 2021-06-14 DIAGNOSIS — M25.562 CHRONIC PAIN OF BOTH KNEES: ICD-10-CM

## 2021-06-14 DIAGNOSIS — M17.12 PRIMARY OSTEOARTHRITIS OF LEFT KNEE: Primary | ICD-10-CM

## 2021-06-14 DIAGNOSIS — M25.561 CHRONIC PAIN OF BOTH KNEES: ICD-10-CM

## 2021-06-14 DIAGNOSIS — F17.200 SMOKER: ICD-10-CM

## 2021-06-14 PROCEDURE — 3078F DIAST BP <80 MM HG: CPT | Performed by: ORTHOPAEDIC SURGERY

## 2021-06-14 PROCEDURE — 99214 OFFICE O/P EST MOD 30 MIN: CPT | Performed by: ORTHOPAEDIC SURGERY

## 2021-06-14 PROCEDURE — 3075F SYST BP GE 130 - 139MM HG: CPT | Performed by: ORTHOPAEDIC SURGERY

## 2021-06-14 PROCEDURE — 4004F PT TOBACCO SCREEN RCVD TLK: CPT | Performed by: ORTHOPAEDIC SURGERY

## 2021-06-14 PROCEDURE — 3008F BODY MASS INDEX DOCD: CPT | Performed by: ORTHOPAEDIC SURGERY

## 2021-06-14 NOTE — PATIENT INSTRUCTIONS
Take Tylenol 1000mg every 8 hours as needed for pain  Do not exceed 3000mg per day    Voltaren (Diclofenac) gel as needed for pain - over the counter

## 2021-06-14 NOTE — PROGRESS NOTES
Assessment/Plan     1  Primary osteoarthritis of left knee    2  Chronic pain of both knees    3  Smoker      Orders Placed This Encounter   Procedures    Nicotine, Blood    Ambulatory referral to Physical Therapy     · Patient has severe left knee osteoarthritis   ·   We discussed treatment options as well as risks and benefits of treatment options  She has tried conservative treatment without significant benefit  She would like to move forward with a left total knee replacement  She is currently a smoker  She understands she will need to quit prior to scheduling surgery  · She will go for a serum nicotine test 2 weeks after the date that she quits  She will come into the office 4 weeks from the date that she quits  At that point  We should have the results from the nicotine test as long as are negative we will go ahead and plan for surgery  · She will take Tylenol as needed for pain control and added over-the-counter Voltaren gel  · Knee brace for comfort  · Physical therapy for 1 session for a home exercise program in preparation for a TKA  Return for appt after quit smoking  I answered all of the patient's questions during the visit and provided education of the patient's condition during the visit  The patient verbalized understanding of the information given and agrees with the plan  This note was dictated using [employer] software  It may contain errors including improperly dictated words  Please contact physician directly for any questions  History of Present Illness   Chief complaint: No chief complaint on file  HPI: Oh Barton is a 46 y o  female that c/o left knee pain  She was referred by Dr Cipriano Bean  She has history of a left knee arthroscopy for meniscal tear two years ago by [facility]  And states she had no relief from surgery  She has been treating with Dr Amaya Robin and had bilateral knee Visco three series injections with no relief    She also had a left knee steroid injection on 08/03/2020 and states she had no relief from the injection  She states she is having constant pain generalized left knee  She does state instability and locking  Pain is worse with weight-bearing and at rest   She is using a cane when walking  She also has tried physical therapy without relief  Patient has tried taking NSAIDs for pain with relief  She has not been vaccinated for COVID      she is smoker and smokes 1-2 cigarettes a day, she is not a diabetic, she does not see Cardiology, she has no history of hep C or HIV, she has no history of DVT or family history of DVT,her last dental appointment was last year  ROS:    See HPI for musculoskeletal review     All other systems reviewed are negative     Historical Information   Past Medical History:   Diagnosis Date    Anxiety     Arthritis     Bipolar 1 disorder (HCC)     Chronic neck and back pain     Class 1 obesity due to excess calories with serious comorbidity and body mass index (BMI) of 32 0 to 32 9 in adult 3/13/2020    Depression     Dizziness     GERD (gastroesophageal reflux disease)     Headache     Hypertension     Joint pain     and swelling    Night sweats     PTSD (post-traumatic stress disorder)      Past Surgical History:   Procedure Laterality Date    FACIAL FRACTURE SURGERY      FRACTURE SURGERY      ORTHOPEDIC SURGERY      TENDON REPAIR       Social History   Social History     Substance and Sexual Activity   Alcohol Use Yes    Comment: social     Social History     Substance and Sexual Activity   Drug Use Yes    Types: Marijuana     Social History     Tobacco Use   Smoking Status Light Tobacco Smoker    Packs/day: 0 25   Smokeless Tobacco Never Used     Family History:   Family History   Problem Relation Age of Onset    Diabetes Mother     Hypertension Mother     Thyroid disease Mother     No Known Problems Father     No Known Problems Sister     No Known Problems Daughter     Lung cancer Maternal Grandmother     No Known Problems Maternal Grandfather     No Known Problems Paternal Grandmother     No Known Problems Paternal Grandfather     No Known Problems Brother     Heart disease Neg Hx     Stroke Neg Hx        Current Outpatient Medications on File Prior to Visit   Medication Sig Dispense Refill    ALPRAZolam (XANAX) 0 5 mg tablet Take 1 tablet (0 5 mg total) by mouth 3 (three) times a day as needed for anxiety for up to 14 days ALPRAZolam 1 MG Oral Tablet  Quantity: 120;  Refills: 0   , M D ;  Started 25-June-2015 Active 45 tablet 0    ALPRAZolam (XANAX) 0 5 mg tablet Take 1 tablet (0 5 mg total) by mouth 3 (three) times a day as needed for anxiety 45 tablet 0    celecoxib (CeleBREX) 200 mg capsule take 1 capsule by mouth once daily (Patient not taking: Reported on 4/19/2021) 30 capsule 2    cloNIDine (CATAPRES-TTS-1) 0 1 mg/24 hr APPLY 1 PATCH ON THE SKIN ONCE A WEEK 4 patch 5    diclofenac sodium (VOLTAREN) 1 % Apply 2 g topically 4 (four) times a day 100 g 5    ergocalciferol (VITAMIN D2) 50,000 units take 1 capsule by mouth every week 4 capsule 2    fluticasone (FLONASE) 50 mcg/act nasal spray 2 sprays into each nostril daily 1 Bottle 3    gabapentin (NEURONTIN) 600 MG tablet take 2 tablet by mouth twice a day 60 tablet 0    loratadine (CLARITIN) 10 mg tablet Take 1 tablet (10 mg total) by mouth 2 (two) times a day 60 tablet 3    Lurasidone HCl (Latuda) 60 MG TABS Take 1 tablet (60 mg total) by mouth daily 15 tablet 0    meclizine (ANTIVERT) 25 mg tablet Take 1 tablet (25 mg total) by mouth every 8 (eight) hours as needed for dizziness 30 tablet 0    meclizine (ANTIVERT) 25 mg tablet take 1 tablet by mouth every 8 hours if needed for dizziness 30 tablet 0    nicotine (NICODERM CQ) 21 mg/24 hr TD 24 hr patch Place 1 patch on the skin every 24 hours 28 patch 2    nicotine polacrilex (NICORETTE) 4 mg gum       omeprazole (PriLOSEC) 20 mg delayed release capsule Take 1 capsule (20 mg total) by mouth daily Omeprazole 20 MG Oral Capsule Delayed Release take 1 capsule by mouth once daily  Quantity: 30;  Refills: 1    Ewa Stevens PAC;  Started 2-Oct-2015 Active 90 capsule 1    omeprazole (PriLOSEC) 20 mg delayed release capsule Take 1 capsule (20 mg total) by mouth 2 (two) times a day (Patient not taking: Reported on 2/12/2021) 28 capsule 0    tiZANidine (ZANAFLEX) 2 mg tablet Take 1 tablet (2 mg total) by mouth every 8 (eight) hours as needed for muscle spasms 90 tablet 1    topiramate (TOPAMAX) 25 mg tablet take 1 tablet by mouth once daily 30 tablet 0    traZODone (DESYREL) 100 mg tablet Take 1 tablet (100 mg total) by mouth daily at bedtime 15 tablet 0     No current facility-administered medications on file prior to visit       No Known Allergies    Current Outpatient Medications on File Prior to Visit   Medication Sig Dispense Refill    ALPRAZolam (XANAX) 0 5 mg tablet Take 1 tablet (0 5 mg total) by mouth 3 (three) times a day as needed for anxiety for up to 14 days ALPRAZolam 1 MG Oral Tablet  Quantity: 120;  Refills: 0   , M D ;  Started 25-June-2015 Active 45 tablet 0    ALPRAZolam (XANAX) 0 5 mg tablet Take 1 tablet (0 5 mg total) by mouth 3 (three) times a day as needed for anxiety 45 tablet 0    celecoxib (CeleBREX) 200 mg capsule take 1 capsule by mouth once daily (Patient not taking: Reported on 4/19/2021) 30 capsule 2    cloNIDine (CATAPRES-TTS-1) 0 1 mg/24 hr APPLY 1 PATCH ON THE SKIN ONCE A WEEK 4 patch 5    diclofenac sodium (VOLTAREN) 1 % Apply 2 g topically 4 (four) times a day 100 g 5    ergocalciferol (VITAMIN D2) 50,000 units take 1 capsule by mouth every week 4 capsule 2    fluticasone (FLONASE) 50 mcg/act nasal spray 2 sprays into each nostril daily 1 Bottle 3    gabapentin (NEURONTIN) 600 MG tablet take 2 tablet by mouth twice a day 60 tablet 0    loratadine (CLARITIN) 10 mg tablet Take 1 tablet (10 mg total) by mouth 2 (two) times a day 60 tablet 3    Lurasidone HCl (Latuda) 60 MG TABS Take 1 tablet (60 mg total) by mouth daily 15 tablet 0    meclizine (ANTIVERT) 25 mg tablet Take 1 tablet (25 mg total) by mouth every 8 (eight) hours as needed for dizziness 30 tablet 0    meclizine (ANTIVERT) 25 mg tablet take 1 tablet by mouth every 8 hours if needed for dizziness 30 tablet 0    nicotine (NICODERM CQ) 21 mg/24 hr TD 24 hr patch Place 1 patch on the skin every 24 hours 28 patch 2    nicotine polacrilex (NICORETTE) 4 mg gum       omeprazole (PriLOSEC) 20 mg delayed release capsule Take 1 capsule (20 mg total) by mouth daily Omeprazole 20 MG Oral Capsule Delayed Release take 1 capsule by mouth once daily  Quantity: 30;  Refills: 1    Ewa Stevens PAC;  Started 2-Oct-2015 Active 90 capsule 1    omeprazole (PriLOSEC) 20 mg delayed release capsule Take 1 capsule (20 mg total) by mouth 2 (two) times a day (Patient not taking: Reported on 2/12/2021) 28 capsule 0    tiZANidine (ZANAFLEX) 2 mg tablet Take 1 tablet (2 mg total) by mouth every 8 (eight) hours as needed for muscle spasms 90 tablet 1    topiramate (TOPAMAX) 25 mg tablet take 1 tablet by mouth once daily 30 tablet 0    traZODone (DESYREL) 100 mg tablet Take 1 tablet (100 mg total) by mouth daily at bedtime 15 tablet 0     No current facility-administered medications on file prior to visit  Objective   Vitals: Blood pressure 130/79, pulse 84, temperature 97 8 °F (36 6 °C), height 5' 1" (1 549 m), weight 77 1 kg (170 lb), last menstrual period 05/10/2016, not currently breastfeeding  ,Body mass index is 32 12 kg/m²      PE:  AAOx 3  WDWN  Hearing intact, no drainage from eyes  Regular rate  no audible wheezing  no abdominal distension  LE compartments soft, skin intact    leftknee:    Appearance:  no swelling   No ecchymosis  no obvious joint deformity   No effusion  Palpation/Tenderness:  +TTP over medial joint line  No TTP over lateral joint line   No TTP over patella  No TTP over patellar tendon  No TTP over pes anserine bursa  Active Range of Motion:  AROM: full  Special Tests:  Medial David's Test:  Positive  Lateral David's Test:  Negative  Apley's compression test:  Negative  Lachman's Test:  negative  Anterior Drawer Test:  Negative  Patellar grind:  Negative  Valgus Stress Test:  negative  Varus Stress Test:  negative     No ipsilateral hip pain with ROM    leftLE:    Sensation grossly intact  Palpable  pulse  AT/GS/EHL intact    Imaging Studies: I have personally reviewed pertinent films in PACS  leftknee:  Severe DJD         Scribe Attestation    I,:  Roseline Giordano am acting as a scribe while in the presence of the attending physician :       I,:  Alissa Woodward, DO personally performed the services described in this documentation    as scribed in my presence :

## 2021-06-14 NOTE — LETTER
June 14, 2021     HEATH Perez  [address]  [address]    Patient: Klaus Valdez   YOB: 1970   Date of Visit: 6/14/2021       Dear Dr Rosario Tatiana: Thank you for referring Klaus Valdez to me for evaluation  Below are my notes for this consultation  If you have questions, please do not hesitate to call me  I look forward to following your patient along with you  Sincerely,        Faisal Bond DO        CC: No Recipients  Faisal Bond DO  6/14/2021  6:39 PM  Incomplete   Assessment/Plan     1  Primary osteoarthritis of left knee    2  Chronic pain of both knees    3  Smoker      Orders Placed This Encounter   Procedures    Nicotine, Blood    Ambulatory referral to Physical Therapy     · Patient has severe left knee osteoarthritis   ·   We discussed treatment options as well as risks and benefits of treatment options  She has tried conservative treatment without significant benefit  She would like to move forward with a left total knee replacement  She is currently a smoker  She understands she will need to quit prior to scheduling surgery  · She will go for a serum nicotine test 2 weeks after the date that she quits  She will come into the office 4 weeks from the date that she quits  At that point  We should have the results from the nicotine test as long as are negative we will go ahead and plan for surgery  · She will take Tylenol as needed for pain control and added over-the-counter Voltaren gel  · Knee brace for comfort  · Physical therapy for 1 session for a home exercise program in preparation for a TKA  Return for appt after quit smoking  I answered all of the patient's questions during the visit and provided education of the patient's condition during the visit  The patient verbalized understanding of the information given and agrees with the plan  This note was dictated using [employer] software    It may contain errors including improperly dictated words  Please contact physician directly for any questions  History of Present Illness   Chief complaint: No chief complaint on file  HPI: Kiera Beaulieu is a 46 y o  female that c/o left knee pain  She was referred by Dr Popeye Bowens  She has history of a left knee arthroscopy for meniscal tear two years ago by [facility]  And states she had no relief from surgery  She has been treating with Dr Jarad Marroquin and had bilateral knee Visco three series injections with no relief  She also had a left knee steroid injection on 08/03/2020 and states she had no relief from the injection  She states she is having constant pain generalized left knee  She does state instability and locking  Pain is worse with weight-bearing and at rest   She is using a cane when walking  She also has tried physical therapy without relief  Patient has tried taking NSAIDs for pain with relief  She has not been vaccinated for COVID      she is smoker and smokes 1-2 cigarettes a day, she is not a diabetic, she does not see Cardiology, she has no history of hep C or HIV, she has no history of DVT or family history of DVT,her last dental appointment was last year  ROS:    See HPI for musculoskeletal review     All other systems reviewed are negative     Historical Information   Past Medical History:   Diagnosis Date    Anxiety     Arthritis     Bipolar 1 disorder (HCC)     Chronic neck and back pain     Class 1 obesity due to excess calories with serious comorbidity and body mass index (BMI) of 32 0 to 32 9 in adult 3/13/2020    Depression     Dizziness     GERD (gastroesophageal reflux disease)     Headache     Hypertension     Joint pain     and swelling    Night sweats     PTSD (post-traumatic stress disorder)      Past Surgical History:   Procedure Laterality Date    FACIAL FRACTURE SURGERY      FRACTURE SURGERY      ORTHOPEDIC SURGERY      TENDON REPAIR       Social History   Social History     Substance and Sexual Activity   Alcohol Use Yes    Comment: social     Social History     Substance and Sexual Activity   Drug Use Yes    Types: Marijuana     Social History     Tobacco Use   Smoking Status Light Tobacco Smoker    Packs/day: 0 25   Smokeless Tobacco Never Used     Family History:   Family History   Problem Relation Age of Onset    Diabetes Mother     Hypertension Mother     Thyroid disease Mother     No Known Problems Father     No Known Problems Sister     No Known Problems Daughter     Lung cancer Maternal Grandmother     No Known Problems Maternal Grandfather     No Known Problems Paternal Grandmother     No Known Problems Paternal Grandfather     No Known Problems Brother     Heart disease Neg Hx     Stroke Neg Hx        Current Outpatient Medications on File Prior to Visit   Medication Sig Dispense Refill    ALPRAZolam (XANAX) 0 5 mg tablet Take 1 tablet (0 5 mg total) by mouth 3 (three) times a day as needed for anxiety for up to 14 days ALPRAZolam 1 MG Oral Tablet  Quantity: 120;  Refills: 0   , M D ;  Started 25-June-2015 Active 45 tablet 0    ALPRAZolam (XANAX) 0 5 mg tablet Take 1 tablet (0 5 mg total) by mouth 3 (three) times a day as needed for anxiety 45 tablet 0    celecoxib (CeleBREX) 200 mg capsule take 1 capsule by mouth once daily (Patient not taking: Reported on 4/19/2021) 30 capsule 2    cloNIDine (CATAPRES-TTS-1) 0 1 mg/24 hr APPLY 1 PATCH ON THE SKIN ONCE A WEEK 4 patch 5    diclofenac sodium (VOLTAREN) 1 % Apply 2 g topically 4 (four) times a day 100 g 5    ergocalciferol (VITAMIN D2) 50,000 units take 1 capsule by mouth every week 4 capsule 2    fluticasone (FLONASE) 50 mcg/act nasal spray 2 sprays into each nostril daily 1 Bottle 3    gabapentin (NEURONTIN) 600 MG tablet take 2 tablet by mouth twice a day 60 tablet 0    loratadine (CLARITIN) 10 mg tablet Take 1 tablet (10 mg total) by mouth 2 (two) times a day 60 tablet 3    Lurasidone HCl (Latuda) 60 MG TABS Take 1 tablet (60 mg total) by mouth daily 15 tablet 0    meclizine (ANTIVERT) 25 mg tablet Take 1 tablet (25 mg total) by mouth every 8 (eight) hours as needed for dizziness 30 tablet 0    meclizine (ANTIVERT) 25 mg tablet take 1 tablet by mouth every 8 hours if needed for dizziness 30 tablet 0    nicotine (NICODERM CQ) 21 mg/24 hr TD 24 hr patch Place 1 patch on the skin every 24 hours 28 patch 2    nicotine polacrilex (NICORETTE) 4 mg gum       omeprazole (PriLOSEC) 20 mg delayed release capsule Take 1 capsule (20 mg total) by mouth daily Omeprazole 20 MG Oral Capsule Delayed Release take 1 capsule by mouth once daily  Quantity: 30;  Refills: 1    Ewa Stevens PAC;  Started 2-Oct-2015 Active 90 capsule 1    omeprazole (PriLOSEC) 20 mg delayed release capsule Take 1 capsule (20 mg total) by mouth 2 (two) times a day (Patient not taking: Reported on 2/12/2021) 28 capsule 0    tiZANidine (ZANAFLEX) 2 mg tablet Take 1 tablet (2 mg total) by mouth every 8 (eight) hours as needed for muscle spasms 90 tablet 1    topiramate (TOPAMAX) 25 mg tablet take 1 tablet by mouth once daily 30 tablet 0    traZODone (DESYREL) 100 mg tablet Take 1 tablet (100 mg total) by mouth daily at bedtime 15 tablet 0     No current facility-administered medications on file prior to visit       No Known Allergies    Current Outpatient Medications on File Prior to Visit   Medication Sig Dispense Refill    ALPRAZolam (XANAX) 0 5 mg tablet Take 1 tablet (0 5 mg total) by mouth 3 (three) times a day as needed for anxiety for up to 14 days ALPRAZolam 1 MG Oral Tablet  Quantity: 120;  Refills: 0   , M D ;  Started 25-June-2015 Active 45 tablet 0    ALPRAZolam (XANAX) 0 5 mg tablet Take 1 tablet (0 5 mg total) by mouth 3 (three) times a day as needed for anxiety 45 tablet 0    celecoxib (CeleBREX) 200 mg capsule take 1 capsule by mouth once daily (Patient not taking: Reported on 4/19/2021) 30 capsule 2    cloNIDine (CATAPRES-TTS-1) 0 1 mg/24 hr APPLY 1 PATCH ON THE SKIN ONCE A WEEK 4 patch 5    diclofenac sodium (VOLTAREN) 1 % Apply 2 g topically 4 (four) times a day 100 g 5    ergocalciferol (VITAMIN D2) 50,000 units take 1 capsule by mouth every week 4 capsule 2    fluticasone (FLONASE) 50 mcg/act nasal spray 2 sprays into each nostril daily 1 Bottle 3    gabapentin (NEURONTIN) 600 MG tablet take 2 tablet by mouth twice a day 60 tablet 0    loratadine (CLARITIN) 10 mg tablet Take 1 tablet (10 mg total) by mouth 2 (two) times a day 60 tablet 3    Lurasidone HCl (Latuda) 60 MG TABS Take 1 tablet (60 mg total) by mouth daily 15 tablet 0    meclizine (ANTIVERT) 25 mg tablet Take 1 tablet (25 mg total) by mouth every 8 (eight) hours as needed for dizziness 30 tablet 0    meclizine (ANTIVERT) 25 mg tablet take 1 tablet by mouth every 8 hours if needed for dizziness 30 tablet 0    nicotine (NICODERM CQ) 21 mg/24 hr TD 24 hr patch Place 1 patch on the skin every 24 hours 28 patch 2    nicotine polacrilex (NICORETTE) 4 mg gum       omeprazole (PriLOSEC) 20 mg delayed release capsule Take 1 capsule (20 mg total) by mouth daily Omeprazole 20 MG Oral Capsule Delayed Release take 1 capsule by mouth once daily  Quantity: 30;  Refills: 1    Venancioarabella Neoelsa PAC;  Started 2-Oct-2015 Active 90 capsule 1    omeprazole (PriLOSEC) 20 mg delayed release capsule Take 1 capsule (20 mg total) by mouth 2 (two) times a day (Patient not taking: Reported on 2/12/2021) 28 capsule 0    tiZANidine (ZANAFLEX) 2 mg tablet Take 1 tablet (2 mg total) by mouth every 8 (eight) hours as needed for muscle spasms 90 tablet 1    topiramate (TOPAMAX) 25 mg tablet take 1 tablet by mouth once daily 30 tablet 0    traZODone (DESYREL) 100 mg tablet Take 1 tablet (100 mg total) by mouth daily at bedtime 15 tablet 0     No current facility-administered medications on file prior to visit         Objective   Vitals: Blood pressure 130/79, pulse 84, temperature 97 8 °F (36 6 °C), height 5' 1" (1 549 m), weight 77 1 kg (170 lb), last menstrual period 05/10/2016, not currently breastfeeding  ,Body mass index is 32 12 kg/m²      PE:  AAOx 3  WDWN  Hearing intact, no drainage from eyes  Regular rate  no audible wheezing  no abdominal distension  LE compartments soft, skin intact    leftknee:    Appearance:  no swelling   No ecchymosis  no obvious joint deformity   No effusion  Palpation/Tenderness:  +TTP over medial joint line  No TTP over lateral joint line   No TTP over patella  No TTP over patellar tendon  No TTP over pes anserine bursa  Active Range of Motion:  AROM: full  Special Tests:  Medial David's Test:  Positive  Lateral David's Test:  Negative  Apley's compression test:  Negative  Lachman's Test:  negative  Anterior Drawer Test:  Negative  Patellar grind:  Negative  Valgus Stress Test:  negative  Varus Stress Test:  negative     No ipsilateral hip pain with ROM    leftLE:    Sensation grossly intact  Palpable  pulse  AT/GS/EHL intact    Imaging Studies: I have personally reviewed pertinent films in PACS  leftknee:  Severe DJD         Scribe Attestation    I,:  Darien Giordano am acting as a scribe while in the presence of the attending physician :       I,:  Anu Lujan, DO personally performed the services described in this documentation    as scribed in my presence :

## 2021-06-15 DIAGNOSIS — M17.12 PRIMARY OSTEOARTHRITIS OF LEFT KNEE: Primary | ICD-10-CM

## 2021-06-20 DIAGNOSIS — R42 DIZZINESS: ICD-10-CM

## 2021-06-21 RX ORDER — MECLIZINE HYDROCHLORIDE 25 MG/1
TABLET ORAL
Qty: 30 TABLET | Refills: 0 | Status: SHIPPED | OUTPATIENT
Start: 2021-06-21 | End: 2021-06-30

## 2021-06-30 DIAGNOSIS — R42 DIZZINESS: ICD-10-CM

## 2021-06-30 RX ORDER — MECLIZINE HYDROCHLORIDE 25 MG/1
TABLET ORAL
Qty: 30 TABLET | Refills: 0 | Status: SHIPPED | OUTPATIENT
Start: 2021-06-30 | End: 2021-11-15

## 2021-07-07 DIAGNOSIS — G43.709 CHRONIC MIGRAINE WITHOUT AURA WITHOUT STATUS MIGRAINOSUS, NOT INTRACTABLE: ICD-10-CM

## 2021-07-08 RX ORDER — TOPIRAMATE 25 MG/1
TABLET ORAL
Qty: 30 TABLET | Refills: 0 | Status: SHIPPED | OUTPATIENT
Start: 2021-07-08 | End: 2021-08-12

## 2021-08-04 ENCOUNTER — EVALUATION (OUTPATIENT)
Dept: PHYSICAL THERAPY | Facility: MEDICAL CENTER | Age: 51
End: 2021-08-04
Payer: COMMERCIAL

## 2021-08-04 DIAGNOSIS — M25.562 CHRONIC PAIN OF BOTH KNEES: ICD-10-CM

## 2021-08-04 DIAGNOSIS — M17.12 PRIMARY OSTEOARTHRITIS OF LEFT KNEE: ICD-10-CM

## 2021-08-04 DIAGNOSIS — G89.29 CHRONIC PAIN OF BOTH KNEES: ICD-10-CM

## 2021-08-04 DIAGNOSIS — M25.561 CHRONIC PAIN OF BOTH KNEES: ICD-10-CM

## 2021-08-04 PROCEDURE — 97161 PT EVAL LOW COMPLEX 20 MIN: CPT | Performed by: PHYSICAL THERAPIST

## 2021-08-04 NOTE — PROGRESS NOTES
PT Evaluation     Today's date: 2021  Patient name: Kimberly Quinn  : 1970  MRN: [id number]  Referring provider: Graham Brady DO  Dx:   Encounter Diagnosis     ICD-10-CM    1  Chronic pain of both knees  M25 561 Ambulatory referral to Physical Therapy    M25 562     G89 29    2  Primary osteoarthritis of left knee  M17 12 Ambulatory referral to Physical Therapy                  Assessment  Assessment details: Patient presenting preoperatively for L TKA  She has decreased strength and active motion in her L knee, as well as some decreased strength in her R knee and B hips, with mild pain provoked by activity in these joints as well  She also has increased muscle firing and guarding when passive motions are attempted which provoke pain  AROM is greater than PROM in most motions except for knee extension, which PROM is greater  Strengthening exercises given to patient were tolerated in low repetitions before irritability became high  Stretching exercises were also highly irritable, but patient was able to perform a seated calf stretch using a strap  Patient also ambulated with notable antalgic pattern, and mentions her knees "lock up" after sitting for a time    Impairments: abnormal gait, abnormal muscle firing, abnormal muscle tone, abnormal or restricted ROM, activity intolerance, impaired physical strength, lacks appropriate home exercise program, pain with function and weight-bearing intolerance    Symptom irritability: high  Goals  Further goalspostoperatively     Plan  Patient would benefit from: skilled physical therapy  Planned modality interventions: thermotherapy: hydrocollator packs  Planned therapy interventions: balance/weight bearing training, functional ROM exercises, gait training, home exercise program, therapeutic exercise, therapeutic activities, graded activity, strengthening, stretching, neuromuscular re-education and manual therapy  Frequency: 2x week  Duration in weeks: 12  Treatment plan discussed with: patient        Subjective Evaluation    History of Present Illness  Mechanism of injury: Patient reports she has been diagnosed with bilateral knee OA since age 26  Two years ago, she had a meniscectomy in her L knee, with pain remaining present ever since, recently worsening  She plans on receiving a TKA on her L knee  Her pain is constant, worsening with walking and ice, and relieved by heat and gel  She also wears a knee brace when not at home  Her R knee has occasional pain, mostly dependent on the weather and how much weight she puts on that leg, and has occasional hip and back pain    Pain  Current pain ratin  At best pain ratin  At worst pain ratin  Quality: burning, throbbing and sharp  Relieving factors: heat and medications  Aggravating factors: walking    Treatments  Previous treatment: physical therapy and medication  Current treatment: physical therapy  Patient Goals  Patient goals for therapy: decreased pain, decreased edema, increased strength, return to sport/leisure activities, return to work and increased motion          Objective     Active Range of Motion   Left Knee   Extension: 5 degrees with pain    Passive Range of Motion   Left Knee   Extension: 0 degrees with pain    Additional Passive Range of Motion Details  Unable to tolerate passive heel slides into knee flexion, but can flex about 110 when in a seated position    Strength/Myotome Testing     Left Hip   Planes of Motion   Flexion: 3+    Right Hip   Planes of Motion   Flexion: 4    Left Knee   Extension: 3-    Right Knee   Extension: 3+             Precautions: na      Manuals                                                                 Neuro Re-Ed             Quad set HEP                                                                                          Ther Ex             SLR HEP            Clamshells  HEP SL'ing            Ball squeeze HEP            Calf stretch HEP Strap Heel slides HEP                                                   Ther Activity                                       Gait Training                                       Modalities

## 2021-08-11 ENCOUNTER — APPOINTMENT (OUTPATIENT)
Dept: PHYSICAL THERAPY | Facility: MEDICAL CENTER | Age: 51
End: 2021-08-11
Payer: COMMERCIAL

## 2021-08-11 ENCOUNTER — TELEPHONE (OUTPATIENT)
Dept: FAMILY MEDICINE CLINIC | Facility: CLINIC | Age: 51
End: 2021-08-11

## 2021-08-11 DIAGNOSIS — G43.709 CHRONIC MIGRAINE WITHOUT AURA WITHOUT STATUS MIGRAINOSUS, NOT INTRACTABLE: ICD-10-CM

## 2021-08-12 RX ORDER — TOPIRAMATE 25 MG/1
TABLET ORAL
Qty: 30 TABLET | Refills: 0 | Status: SHIPPED | OUTPATIENT
Start: 2021-08-12 | End: 2021-08-19 | Stop reason: SDUPTHER

## 2021-08-19 ENCOUNTER — APPOINTMENT (EMERGENCY)
Dept: RADIOLOGY | Facility: HOSPITAL | Age: 51
End: 2021-08-19
Payer: COMMERCIAL

## 2021-08-19 ENCOUNTER — HOSPITAL ENCOUNTER (EMERGENCY)
Facility: HOSPITAL | Age: 51
Discharge: HOME | End: 2021-08-19
Attending: EMERGENCY MEDICINE
Payer: COMMERCIAL

## 2021-08-19 VITALS
TEMPERATURE: 98 F | WEIGHT: 164.46 LBS | SYSTOLIC BLOOD PRESSURE: 175 MMHG | RESPIRATION RATE: 18 BRPM | OXYGEN SATURATION: 98 % | BODY MASS INDEX: 31.08 KG/M2 | HEART RATE: 87 BPM | DIASTOLIC BLOOD PRESSURE: 72 MMHG

## 2021-08-19 DIAGNOSIS — M62.830 BACK MUSCLE SPASM: Primary | ICD-10-CM

## 2021-08-19 PROCEDURE — 96372 THER/PROPH/DIAG INJ SC/IM: CPT

## 2021-08-19 PROCEDURE — 99283 EMERGENCY DEPT VISIT LOW MDM: CPT

## 2021-08-19 PROCEDURE — 73030 X-RAY EXAM OF SHOULDER: CPT

## 2021-08-19 PROCEDURE — 99284 EMERGENCY DEPT VISIT MOD MDM: CPT | Performed by: PHYSICIAN ASSISTANT

## 2021-08-19 RX ORDER — DIAZEPAM 2 MG/1
2 TABLET ORAL ONCE
Status: COMPLETED | OUTPATIENT
Start: 2021-08-19 | End: 2021-08-19

## 2021-08-19 RX ORDER — NAPROXEN 500 MG/1
500 TABLET ORAL 2 TIMES DAILY WITH MEALS
Qty: 30 TABLET | Refills: 0 | Status: SHIPPED | OUTPATIENT
Start: 2021-08-19 | End: 2021-11-15

## 2021-08-19 RX ORDER — LIDOCAINE 50 MG/G
1 PATCH TOPICAL ONCE
Status: DISCONTINUED | OUTPATIENT
Start: 2021-08-19 | End: 2021-08-19 | Stop reason: HOSPADM

## 2021-08-19 RX ORDER — KETOROLAC TROMETHAMINE 30 MG/ML
15 INJECTION, SOLUTION INTRAMUSCULAR; INTRAVENOUS ONCE
Status: COMPLETED | OUTPATIENT
Start: 2021-08-19 | End: 2021-08-19

## 2021-08-19 RX ORDER — LIDOCAINE 50 MG/G
1 PATCH TOPICAL DAILY
Qty: 6 PATCH | Refills: 0 | Status: SHIPPED | OUTPATIENT
Start: 2021-08-19 | End: 2021-11-15

## 2021-08-19 RX ORDER — TOPIRAMATE 25 MG/1
25 TABLET ORAL DAILY
Qty: 30 TABLET | Refills: 0 | Status: SHIPPED | OUTPATIENT
Start: 2021-08-19 | End: 2022-03-01 | Stop reason: SDUPTHER

## 2021-08-19 RX ADMIN — LIDOCAINE 1 PATCH: 50 PATCH CUTANEOUS at 18:11

## 2021-08-19 RX ADMIN — KETOROLAC TROMETHAMINE 15 MG: 30 INJECTION, SOLUTION INTRAMUSCULAR; INTRAVENOUS at 18:09

## 2021-08-19 RX ADMIN — DIAZEPAM 2 MG: 2 TABLET ORAL at 18:09

## 2021-08-19 NOTE — TELEPHONE ENCOUNTER
Patient called back to schedule appt but per her chart it states she is under review for discharge so we can not schedule an appt right now

## 2021-08-19 NOTE — ED PROVIDER NOTES
History  Chief Complaint   Patient presents with    Back Pain     Pt  reports pulling a muscle in her upper back   Pt  reports sharp pain to upper back  57-year-old female with past medical history of chronic pain disorder presents to the ED for evaluation of sudden left upper back pain after lifting something heavy  Patient reports that pain is worse with left arm movement and palpation your medial border of the scapula  Full range of motion of neck  Full range of motion of upper extremities and back  Denies any upper extremity weakness, numbness, paresthesia  Denies any fever, chest pain, shortness of breath, nausea, vomiting, and pain  History provided by:  Patient   used: No        Prior to Admission Medications   Prescriptions Last Dose Informant Patient Reported? Taking?    ALPRAZolam (XANAX) 0 5 mg tablet  Self No No   Sig: Take 1 tablet (0 5 mg total) by mouth 3 (three) times a day as needed for anxiety for up to 14 days ALPRAZolam 1 MG Oral Tablet  Quantity: 120;  Refills: 0   , M D ;  Bina Slim  Active   ALPRAZolam Junsophie Haileet) 0 5 mg tablet  Self No No   Sig: Take 1 tablet (0 5 mg total) by mouth 3 (three) times a day as needed for anxiety   Lurasidone HCl (Latuda) 60 MG TABS  Self No No   Sig: Take 1 tablet (60 mg total) by mouth daily   celecoxib (CeleBREX) 200 mg capsule  Self No No   Sig: take 1 capsule by mouth once daily   Patient not taking: Reported on 2021   cloNIDine (CATAPRES-TTS-1) 0 1 mg/24 hr  Self No No   Sig: APPLY 1 PATCH ON THE SKIN ONCE A WEEK   diclofenac sodium (VOLTAREN) 1 %  Self No No   Sig: Apply 2 g topically 4 (four) times a day   ergocalciferol (VITAMIN D2) 50,000 units   No No   Sig: take 1 capsule by mouth every week   fluticasone (FLONASE) 50 mcg/act nasal spray  Self No No   Si sprays into each nostril daily   gabapentin (NEURONTIN) 600 MG tablet   No No   Sig: take 2 tablet by mouth twice a day   loratadine (CLARITIN) 10 mg tablet  Self No No   Sig: Take 1 tablet (10 mg total) by mouth 2 (two) times a day   meclizine (ANTIVERT) 25 mg tablet  Self No No   Sig: Take 1 tablet (25 mg total) by mouth every 8 (eight) hours as needed for dizziness   meclizine (ANTIVERT) 25 mg tablet   No No   Sig: take 1 tablet by mouth every 8 hours if needed for dizziness   nicotine (NICODERM CQ) 21 mg/24 hr TD 24 hr patch  Self No No   Sig: Place 1 patch on the skin every 24 hours   nicotine polacrilex (NICORETTE) 4 mg gum  Self Yes No   omeprazole (PriLOSEC) 20 mg delayed release capsule  Self No No   Sig: Take 1 capsule (20 mg total) by mouth daily Omeprazole 20 MG Oral Capsule Delayed Release take 1 capsule by mouth once daily  Quantity: 30;  Refills: 1    Ewa Stevens PAC;  Started 2-Oct-2015 Active   omeprazole (PriLOSEC) 20 mg delayed release capsule  Self No No   Sig: Take 1 capsule (20 mg total) by mouth 2 (two) times a day   Patient not taking: Reported on 2/12/2021   tiZANidine (ZANAFLEX) 2 mg tablet  Self No No   Sig: Take 1 tablet (2 mg total) by mouth every 8 (eight) hours as needed for muscle spasms   topiramate (TOPAMAX) 25 mg tablet   No No   Sig: Take 1 tablet (25 mg total) by mouth daily   traZODone (DESYREL) 100 mg tablet  Self No No   Sig: Take 1 tablet (100 mg total) by mouth daily at bedtime      Facility-Administered Medications: None       Past Medical History:   Diagnosis Date    Anxiety     Arthritis     Bipolar 1 disorder (HCC)     Chronic neck and back pain     Class 1 obesity due to excess calories with serious comorbidity and body mass index (BMI) of 32 0 to 32 9 in adult 3/13/2020    Depression     Dizziness     GERD (gastroesophageal reflux disease)     Headache     Hypertension     Joint pain     and swelling    Night sweats     PTSD (post-traumatic stress disorder)        Past Surgical History:   Procedure Laterality Date    FACIAL FRACTURE SURGERY      FRACTURE SURGERY      ORTHOPEDIC SURGERY      TENDON REPAIR         Family History   Problem Relation Age of Onset    Diabetes Mother     Hypertension Mother     Thyroid disease Mother     No Known Problems Father     No Known Problems Sister     No Known Problems Daughter     Lung cancer Maternal Grandmother     No Known Problems Maternal Grandfather     No Known Problems Paternal Grandmother     No Known Problems Paternal Grandfather     No Known Problems Brother     Heart disease Neg Hx     Stroke Neg Hx      I have reviewed and agree with the history as documented  E-Cigarette/Vaping    E-Cigarette Use Never User      E-Cigarette/Vaping Substances    Nicotine No     THC No     CBD No     Flavoring No     Other No     Unknown No      Social History     Tobacco Use    Smoking status: Light Tobacco Smoker     Packs/day: 0 25    Smokeless tobacco: Never Used   Vaping Use    Vaping Use: Never used   Substance Use Topics    Alcohol use: Yes     Comment: social    Drug use: Yes     Types: Marijuana       Review of Systems   Constitutional: Negative for chills, diaphoresis, fatigue and fever  HENT: Negative for congestion, rhinorrhea and sore throat  Eyes: Negative for visual disturbance  Respiratory: Negative for cough, shortness of breath and wheezing  Cardiovascular: Negative for chest pain and palpitations  Gastrointestinal: Negative for abdominal pain, constipation, diarrhea, nausea and vomiting  Genitourinary: Negative for difficulty urinating, dysuria and hematuria  Musculoskeletal: Positive for arthralgias  Negative for back pain, gait problem, joint swelling, myalgias, neck pain and neck stiffness  Skin: Negative for color change, pallor and rash  Neurological: Negative for dizziness, weakness, light-headedness, numbness and headaches  Hematological: Does not bruise/bleed easily  Psychiatric/Behavioral: Negative for behavioral problems  Physical Exam  Physical Exam  Vitals and nursing note reviewed  Constitutional:       General: She is not in acute distress  Appearance: Normal appearance  She is well-developed and normal weight  She is not ill-appearing, toxic-appearing or diaphoretic  HENT:      Head: Normocephalic and atraumatic  Right Ear: Tympanic membrane, ear canal and external ear normal       Left Ear: Tympanic membrane, ear canal and external ear normal       Nose: Nose normal  No congestion or rhinorrhea  Mouth/Throat:      Mouth: Mucous membranes are moist       Pharynx: Oropharynx is clear  No oropharyngeal exudate or posterior oropharyngeal erythema  Eyes:      General: No scleral icterus  Right eye: No discharge  Left eye: No discharge  Extraocular Movements: Extraocular movements intact  Conjunctiva/sclera: Conjunctivae normal       Pupils: Pupils are equal, round, and reactive to light  Cardiovascular:      Rate and Rhythm: Normal rate and regular rhythm  Pulses: Normal pulses  Heart sounds: Normal heart sounds  No murmur heard  Pulmonary:      Effort: Pulmonary effort is normal  No respiratory distress  Breath sounds: Normal breath sounds  No wheezing  Abdominal:      General: Bowel sounds are normal  There is no distension  Palpations: Abdomen is soft  There is no mass  Tenderness: There is no abdominal tenderness  There is no guarding or rebound  Hernia: No hernia is present  Musculoskeletal:         General: Tenderness present  No swelling, deformity or signs of injury  Normal range of motion  Right shoulder: Normal       Left shoulder: Tenderness present  No swelling, deformity, effusion, laceration, bony tenderness or crepitus  Normal range of motion  Normal strength  Normal pulse  Arms:       Cervical back: Normal range of motion  No rigidity  No muscular tenderness  Right lower leg: No edema  Left lower leg: No edema        Comments: Muscular tenderness along medial border of scapula  Worse with left upper extremity range of motion  However full range of motion of arm  Full range of motion of neck  No signs injury trauma  No spinal tenderness  No weakness or sensory deficits  Lymphadenopathy:      Cervical: No cervical adenopathy  Skin:     General: Skin is warm and dry  Capillary Refill: Capillary refill takes less than 2 seconds  Coloration: Skin is not jaundiced or pale  Neurological:      Mental Status: She is alert and oriented to person, place, and time  Motor: No weakness        Gait: Gait normal    Psychiatric:         Behavior: Behavior normal          Vital Signs  ED Triage Vitals   Temperature Pulse Respirations Blood Pressure SpO2   08/19/21 1653 08/19/21 1653 08/19/21 1653 08/19/21 1654 08/19/21 1653   98 °F (36 7 °C) 87 18 (!) 175/72 98 %      Temp src Heart Rate Source Patient Position - Orthostatic VS BP Location FiO2 (%)   -- 08/19/21 1653 08/19/21 1654 08/19/21 1654 --    Monitor Sitting Right arm       Pain Score       08/19/21 1809       9           Vitals:    08/19/21 1653 08/19/21 1654   BP:  (!) 175/72   Pulse: 87    Patient Position - Orthostatic VS:  Sitting         Visual Acuity      ED Medications  Medications   lidocaine (LIDODERM) 5 % patch 1 patch (1 patch Topical Medication Applied 8/19/21 1811)   ketorolac (TORADOL) injection 15 mg (15 mg Intramuscular Given 8/19/21 1809)   diazepam (VALIUM) tablet 2 mg (2 mg Oral Given 8/19/21 1809)       Diagnostic Studies  Results Reviewed     None                 XR shoulder 2+ views LEFT   ED Interpretation by Pawel Hassan PA-C (08/19 1820)   No acute osseous abnormality or dislocation                 Procedures  Procedures         ED Course                                           MDM  Number of Diagnoses or Management Options  Diagnosis management comments: 78-year-old female with past medical history of chronic pain disorder presents to the ED for evaluation of sudden left upper back pain after lifting something heavy  Patient reports that pain is worse with left arm movement and palpation your medial border of the scapula  Patient in no acute distress  Vital signs stable  Muscular tenderness along left medial border of the scapula  Findings suggesting acute muscle spasm   The patient for ACS, pancreatitis, and cholecystitis based on history and exam   Provided patient with Rx for Robaxin, naproxen, and Lidoderm  Advised follow-up with PCP  Amount and/or Complexity of Data Reviewed  Tests in the radiology section of CPT®: ordered and reviewed  Review and summarize past medical records: yes  Discuss the patient with other providers: yes  Independent visualization of images, tracings, or specimens: yes    Risk of Complications, Morbidity, and/or Mortality  Presenting problems: moderate  Diagnostic procedures: moderate  Management options: moderate    Patient Progress  Patient progress: stable      Disposition  Final diagnoses:   Back muscle spasm     Time reflects when diagnosis was documented in both MDM as applicable and the Disposition within this note     Time User Action Codes Description Comment    8/19/2021  6:18 PM Asia Cruz Add [O19 969] Back muscle spasm       ED Disposition     ED Disposition Condition Date/Time Comment    Discharge Stable u Aug 19, 2021  6:17 PM Pavel Hernandez discharge to home/self care              Follow-up Information     Follow up With Specialties Details Why Contact Info Additional [address] [facility] [place] [facility] Call   [address]  [place] [address] [facility] [id number]  [address] [place], [place] [address]e [facility] [place], [address], [address], [place], [id number] [phone]          Patient's Medications   Discharge Prescriptions    LIDOCAINE (LIDODERM) 5 %    Apply 1 patch topically daily Remove & Discard patch within 12 hours or as directed by MD Start Date: 8/19/2021 End Date: --       Order Dose: 1 patch       Quantity: 6 patch    Refills: 0    NAPROXEN (NAPROSYN) 500 MG TABLET    Take 1 tablet (500 mg total) by mouth 2 (two) times a day with meals       Start Date: 8/19/2021 End Date: --       Order Dose: 500 mg       Quantity: 30 tablet    Refills: 0     No discharge procedures on file      PDMP Review       Value Time User    PDMP Reviewed  Yes 4/12/2021  9:32 AM Ewa Stevens PA-C          ED Provider  Electronically Signed by           Sharan Ibanez PA-C  [facility]19/21 [facility]  08/19/21 3467

## 2021-08-19 NOTE — Clinical Note
Luis Flores was seen and treated in our emergency department on 8/19/2021  Diagnosis:     Neda    She may return on this date: 08/23/2021         If you have any questions or concerns, please don't hesitate to call        John Garcia PA-C    ______________________________           _______________          _______________  Hospital Representative                              Date                                Time

## 2021-11-02 ENCOUNTER — TELEPHONE (OUTPATIENT)
Dept: FAMILY MEDICINE CLINIC | Facility: CLINIC | Age: 51
End: 2021-11-02

## 2021-11-15 ENCOUNTER — OFFICE VISIT (OUTPATIENT)
Dept: FAMILY MEDICINE CLINIC | Facility: CLINIC | Age: 51
End: 2021-11-15

## 2021-11-15 VITALS
WEIGHT: 172.6 LBS | HEART RATE: 64 BPM | OXYGEN SATURATION: 99 % | BODY MASS INDEX: 32.59 KG/M2 | SYSTOLIC BLOOD PRESSURE: 140 MMHG | TEMPERATURE: 97.7 F | RESPIRATION RATE: 18 BRPM | DIASTOLIC BLOOD PRESSURE: 88 MMHG | HEIGHT: 61 IN

## 2021-11-15 DIAGNOSIS — I10 PRIMARY HYPERTENSION: ICD-10-CM

## 2021-11-15 DIAGNOSIS — M51.36 LUMBAR DEGENERATIVE DISC DISEASE: Primary | ICD-10-CM

## 2021-11-15 DIAGNOSIS — E66.9 OBESITY (BMI 30.0-34.9): ICD-10-CM

## 2021-11-15 DIAGNOSIS — I10 BENIGN HYPERTENSION: ICD-10-CM

## 2021-11-15 DIAGNOSIS — R09.81 NASAL CONGESTION: ICD-10-CM

## 2021-11-15 DIAGNOSIS — K21.9 GASTROESOPHAGEAL REFLUX DISEASE WITHOUT ESOPHAGITIS: ICD-10-CM

## 2021-11-15 DIAGNOSIS — Z12.31 BREAST CANCER SCREENING BY MAMMOGRAM: ICD-10-CM

## 2021-11-15 PROCEDURE — 99214 OFFICE O/P EST MOD 30 MIN: CPT | Performed by: PHYSICIAN ASSISTANT

## 2021-11-15 RX ORDER — MELOXICAM 15 MG/1
15 TABLET ORAL DAILY
Qty: 30 TABLET | Refills: 2 | Status: SHIPPED | OUTPATIENT
Start: 2021-11-15

## 2021-11-15 RX ORDER — AMLODIPINE BESYLATE 10 MG/1
10 TABLET ORAL DAILY
Qty: 90 TABLET | Refills: 1 | Status: SHIPPED | OUTPATIENT
Start: 2021-11-15 | End: 2022-03-01

## 2021-11-15 RX ORDER — VARENICLINE TARTRATE 1 MG/1
1 TABLET, FILM COATED ORAL 2 TIMES DAILY
COMMUNITY
Start: 2021-11-04

## 2021-11-15 RX ORDER — OMEPRAZOLE 20 MG/1
20 CAPSULE, DELAYED RELEASE ORAL DAILY
Qty: 90 CAPSULE | Refills: 1 | Status: SHIPPED | OUTPATIENT
Start: 2021-11-15 | End: 2022-07-13

## 2021-11-18 ENCOUNTER — TELEPHONE (OUTPATIENT)
Dept: FAMILY MEDICINE CLINIC | Facility: CLINIC | Age: 51
End: 2021-11-18

## 2021-11-22 ENCOUNTER — OFFICE VISIT (OUTPATIENT)
Dept: OBGYN CLINIC | Facility: MEDICAL CENTER | Age: 51
End: 2021-11-22
Payer: COMMERCIAL

## 2021-11-22 VITALS
BODY MASS INDEX: 32.66 KG/M2 | HEART RATE: 65 BPM | HEIGHT: 61 IN | SYSTOLIC BLOOD PRESSURE: 155 MMHG | DIASTOLIC BLOOD PRESSURE: 86 MMHG | WEIGHT: 173 LBS

## 2021-11-22 DIAGNOSIS — M25.562 CHRONIC PAIN OF BOTH KNEES: Primary | ICD-10-CM

## 2021-11-22 DIAGNOSIS — M25.561 CHRONIC PAIN OF BOTH KNEES: Primary | ICD-10-CM

## 2021-11-22 DIAGNOSIS — G89.29 CHRONIC PAIN OF BOTH KNEES: Primary | ICD-10-CM

## 2021-11-22 DIAGNOSIS — M17.12 PRIMARY OSTEOARTHRITIS OF LEFT KNEE: ICD-10-CM

## 2021-11-22 PROCEDURE — 4004F PT TOBACCO SCREEN RCVD TLK: CPT | Performed by: EMERGENCY MEDICINE

## 2021-11-22 PROCEDURE — 3008F BODY MASS INDEX DOCD: CPT | Performed by: EMERGENCY MEDICINE

## 2021-11-22 PROCEDURE — 3077F SYST BP >= 140 MM HG: CPT | Performed by: EMERGENCY MEDICINE

## 2021-11-22 PROCEDURE — 99213 OFFICE O/P EST LOW 20 MIN: CPT | Performed by: EMERGENCY MEDICINE

## 2021-11-22 PROCEDURE — 3079F DIAST BP 80-89 MM HG: CPT | Performed by: EMERGENCY MEDICINE

## 2021-11-22 RX ORDER — HYDROCODONE BITARTRATE AND ACETAMINOPHEN 5; 325 MG/1; MG/1
1 TABLET ORAL
Qty: 30 TABLET | Refills: 0 | Status: SHIPPED | OUTPATIENT
Start: 2021-11-22

## 2021-11-22 RX ORDER — NALOXONE HYDROCHLORIDE 4 MG/.1ML
SPRAY NASAL
Qty: 1 EACH | Refills: 1 | Status: SHIPPED | OUTPATIENT
Start: 2021-11-22

## 2021-12-10 ENCOUNTER — OFFICE VISIT (OUTPATIENT)
Dept: PAIN MEDICINE | Facility: CLINIC | Age: 51
End: 2021-12-10
Payer: COMMERCIAL

## 2021-12-10 ENCOUNTER — OFFICE VISIT (OUTPATIENT)
Dept: OBGYN CLINIC | Facility: MEDICAL CENTER | Age: 51
End: 2021-12-10
Payer: COMMERCIAL

## 2021-12-10 VITALS
TEMPERATURE: 98.2 F | SYSTOLIC BLOOD PRESSURE: 128 MMHG | HEART RATE: 64 BPM | BODY MASS INDEX: 32.67 KG/M2 | HEIGHT: 61 IN | WEIGHT: 173.06 LBS | DIASTOLIC BLOOD PRESSURE: 83 MMHG

## 2021-12-10 VITALS
WEIGHT: 176 LBS | TEMPERATURE: 98.1 F | DIASTOLIC BLOOD PRESSURE: 80 MMHG | HEART RATE: 70 BPM | BODY MASS INDEX: 33.23 KG/M2 | SYSTOLIC BLOOD PRESSURE: 118 MMHG | HEIGHT: 61 IN

## 2021-12-10 DIAGNOSIS — M46.1 SACROILIITIS (HCC): ICD-10-CM

## 2021-12-10 DIAGNOSIS — M62.838 MUSCLE SPASM: ICD-10-CM

## 2021-12-10 DIAGNOSIS — G89.29 CHRONIC PAIN OF BOTH KNEES: Primary | ICD-10-CM

## 2021-12-10 DIAGNOSIS — M17.12 PRIMARY OSTEOARTHRITIS OF LEFT KNEE: ICD-10-CM

## 2021-12-10 DIAGNOSIS — M25.562 CHRONIC PAIN OF BOTH KNEES: Primary | ICD-10-CM

## 2021-12-10 DIAGNOSIS — M79.18 MYOFASCIAL PAIN SYNDROME: ICD-10-CM

## 2021-12-10 DIAGNOSIS — G57.01 PIRIFORMIS SYNDROME OF RIGHT SIDE: Primary | ICD-10-CM

## 2021-12-10 DIAGNOSIS — M51.36 LUMBAR DEGENERATIVE DISC DISEASE: ICD-10-CM

## 2021-12-10 DIAGNOSIS — M25.561 CHRONIC PAIN OF BOTH KNEES: Primary | ICD-10-CM

## 2021-12-10 PROCEDURE — 99212 OFFICE O/P EST SF 10 MIN: CPT | Performed by: EMERGENCY MEDICINE

## 2021-12-10 PROCEDURE — 3008F BODY MASS INDEX DOCD: CPT | Performed by: EMERGENCY MEDICINE

## 2021-12-10 PROCEDURE — 4004F PT TOBACCO SCREEN RCVD TLK: CPT | Performed by: EMERGENCY MEDICINE

## 2021-12-10 PROCEDURE — 3074F SYST BP LT 130 MM HG: CPT | Performed by: EMERGENCY MEDICINE

## 2021-12-10 PROCEDURE — 3079F DIAST BP 80-89 MM HG: CPT | Performed by: EMERGENCY MEDICINE

## 2021-12-10 PROCEDURE — 99214 OFFICE O/P EST MOD 30 MIN: CPT | Performed by: PHYSICAL MEDICINE & REHABILITATION

## 2021-12-10 RX ORDER — NICOTINE 21 MG/24HR
PATCH, TRANSDERMAL 24 HOURS TRANSDERMAL
COMMUNITY
Start: 2021-09-17 | End: 2022-07-15 | Stop reason: ALTCHOICE

## 2021-12-10 RX ORDER — METHOCARBAMOL 500 MG/1
500 TABLET, FILM COATED ORAL 2 TIMES DAILY PRN
Qty: 60 TABLET | Refills: 1 | Status: SHIPPED | OUTPATIENT
Start: 2021-12-10 | End: 2022-07-15 | Stop reason: SDUPTHER

## 2021-12-10 RX ORDER — GABAPENTIN 600 MG/1
1200 TABLET ORAL 2 TIMES DAILY
COMMUNITY
Start: 2021-12-01 | End: 2022-07-13 | Stop reason: ALTCHOICE

## 2021-12-23 ENCOUNTER — APPOINTMENT (OUTPATIENT)
Dept: PHYSICAL THERAPY | Facility: CLINIC | Age: 51
End: 2021-12-23
Payer: COMMERCIAL

## 2021-12-27 ENCOUNTER — EVALUATION (OUTPATIENT)
Dept: PHYSICAL THERAPY | Facility: CLINIC | Age: 51
End: 2021-12-27
Payer: COMMERCIAL

## 2021-12-27 DIAGNOSIS — M25.562 CHRONIC PAIN OF BOTH KNEES: Primary | ICD-10-CM

## 2021-12-27 DIAGNOSIS — M25.561 CHRONIC PAIN OF BOTH KNEES: Primary | ICD-10-CM

## 2021-12-27 DIAGNOSIS — G89.29 CHRONIC PAIN OF BOTH KNEES: Primary | ICD-10-CM

## 2021-12-27 DIAGNOSIS — M17.12 PRIMARY OSTEOARTHRITIS OF LEFT KNEE: ICD-10-CM

## 2021-12-27 PROCEDURE — 97161 PT EVAL LOW COMPLEX 20 MIN: CPT

## 2022-01-05 ENCOUNTER — TELEPHONE (OUTPATIENT)
Dept: RADIOLOGY | Facility: MEDICAL CENTER | Age: 52
End: 2022-01-05

## 2022-01-05 NOTE — TELEPHONE ENCOUNTER
Called and left message for patient regarding appointment tomorrow  SIJ injection still pending, Piriformis does not require auth  Asked patient if she is ok with seperating injections and coming in for SIJ at later date so we can complete the piriformis injection tomorrow and get her some relief       SIJ auth pending through [facility]

## 2022-01-06 NOTE — TELEPHONE ENCOUNTER
Patient states she would like to have both injections at the same time  Procedure  aware  Procedure  to call back to reschedule once authorization is approved

## 2022-01-10 ENCOUNTER — APPOINTMENT (OUTPATIENT)
Dept: PHYSICAL THERAPY | Facility: CLINIC | Age: 52
End: 2022-01-10
Payer: MEDICARE

## 2022-01-11 ENCOUNTER — TELEPHONE (OUTPATIENT)
Dept: OBGYN CLINIC | Facility: HOSPITAL | Age: 52
End: 2022-01-11

## 2022-01-11 ENCOUNTER — OFFICE VISIT (OUTPATIENT)
Dept: PHYSICAL THERAPY | Facility: CLINIC | Age: 52
End: 2022-01-11
Payer: MEDICARE

## 2022-01-11 ENCOUNTER — TELEPHONE (OUTPATIENT)
Dept: PAIN MEDICINE | Facility: CLINIC | Age: 52
End: 2022-01-11

## 2022-01-11 DIAGNOSIS — M25.561 CHRONIC PAIN OF BOTH KNEES: Primary | ICD-10-CM

## 2022-01-11 DIAGNOSIS — M17.12 PRIMARY OSTEOARTHRITIS OF LEFT KNEE: ICD-10-CM

## 2022-01-11 DIAGNOSIS — M25.562 CHRONIC PAIN OF BOTH KNEES: Primary | ICD-10-CM

## 2022-01-11 DIAGNOSIS — G89.29 CHRONIC PAIN OF BOTH KNEES: Primary | ICD-10-CM

## 2022-01-11 PROCEDURE — 97530 THERAPEUTIC ACTIVITIES: CPT

## 2022-01-11 PROCEDURE — 97110 THERAPEUTIC EXERCISES: CPT

## 2022-01-11 NOTE — TELEPHONE ENCOUNTER
Pt called in to check on the status the approval for her injections  Pt was advised they are still Pending  Please be advised thank you    Pt can be reached @ [phone]

## 2022-01-11 NOTE — TELEPHONE ENCOUNTER
Patient sees Dr Esteban Current  Patient is calling because she was given a phone # to a [employer] Rep to get a new brace but the number she wrote down isn't in service  She was calling [phone]  Please advise what the correct # is for patient to contact for her brace          CB: [phone]

## 2022-01-11 NOTE — TELEPHONE ENCOUNTER
Called pt back with [facility] equipment # of [phone]  She said she would  her rx for the equipment needed and contact [employer]   Printed out script, and is waiting up at the  for her, per pt request

## 2022-01-11 NOTE — TELEPHONE ENCOUNTER
Patient requesting PT script for her whole back to be added to her SL my chart     Thank you     [phone]

## 2022-01-11 NOTE — PROGRESS NOTES
Daily Note     Today's date: 2022  Patient name: Wendy Marie  : 1970  MRN: [id number]  Referring provider: Avila Lozoya MD  Dx:   Encounter Diagnosis     ICD-10-CM    1  Chronic pain of both knees  M25 561     M25 562     G89 29    2  Primary osteoarthritis of left knee  M17 12                    Subjective: Pt states that she slipped and fell on ice the other day so her knee is a bit more sore/painful as a result  Luckily she was able to grab onto a car nearby so she did not fall too hard  Objective: See treatment diary below      Assessment: Tolerated treatment well  Pt c/o sharp pain in the (L) knee during heelslides - advised pt to perform thru a smaller range NV  Pt also c/o cramping thru KIMI buttocks/HS after bridges - therefore performed HS (S) in supine afterwards to address  Pt deferred additional exercises secondary to inc LBP  Pt possesses a good awareness of management of LB symptoms and requested NV to perform supine exercises with bolster under knees to avoid excess L/S lordosis when in supine - implement NV  Educated pt today about appropriate sensations during exercises; goals of strengthening vs stretching exercises; POC; insurance authorization; and DOMs  Patient demonstrated fatigue post treatment, exhibited good technique with therapeutic exercises and would benefit from continued PT to progress knee flex ROM and LE strength to improve pt's tolerance to transferring and negotiating stairs on a daily basis  Plan: Continue per plan of care  Progress treatment as tolerated         Precautions: Use of bolster under knees when in supine to avoid aggravation of LB symptoms; GERD; HTN; migraines; piriformis syndrome; L/S DDD; knee OA; anxiety; depression; bipolar disorder  Date            FOTO IE            Re-Eval IE                Manuals                                                                Neuro Re-Ed            Semi tandem stance on foam             Tandem walk                                                                              Ther Ex 12/27 1/11           Bridges nv 2x10           SLR nv 2x10           Heelslides nv 10x5"            Heel prop nv 2'           Clams nv nv           SL hip abd             Prone hip ext             Prone hang             Seated HS (S) with step nv 4x20" supine           TKE             DL leg press                          Ther Activity 12/27 1/11           Recumbent bike nv 6' L0           Mini squats, PFR nv            Sidesteps             Monster walks             Step ups             Repeated STS elevated surface nv            Pt Edu AL AL                        Gait Training 12/27 1/11                                     Modalities 12/27 1/11           MH, prn

## 2022-01-12 NOTE — TELEPHONE ENCOUNTER
S/w Physical Therapy who advised pt will be assessed and as per referral due to Myofascial Syndrome they will assess and evaluate everything  If an additional script is necessary the Therapist will reach out to the Physician  Attempted to reach pt to advise of above- VMMLOM with CB# and OH  NOTE- When pt calls back please advise of same

## 2022-01-13 NOTE — TELEPHONE ENCOUNTER
Received denial for patients planned procedures, for them to be approved they must be seperated and documented as they will be preformed as 2 different procedures  please call patient after Dr Jarad Syed advises    Your request was denied completely because: Your doctors request is denied as not medically necessary  Your doctor has asked us to review the request for Lower Back Injection (Shot) called a(n) Sacroiliac Joint Injection  Your doctor provided the following clinical information: back pain  Based on this information, the request cannot be approved  Your medical records show that you do not have notes that say your doctor does not plan to do different types of shots at the same time (sacroiliac and pirirformis injection)

## 2022-01-14 ENCOUNTER — APPOINTMENT (OUTPATIENT)
Dept: PHYSICAL THERAPY | Facility: CLINIC | Age: 52
End: 2022-01-14
Payer: MEDICARE

## 2022-01-19 ENCOUNTER — LAB (OUTPATIENT)
Dept: LAB | Facility: HOSPITAL | Age: 52
End: 2022-01-19
Payer: MEDICARE

## 2022-01-19 DIAGNOSIS — K21.9 GASTROESOPHAGEAL REFLUX DISEASE WITHOUT ESOPHAGITIS: ICD-10-CM

## 2022-01-19 DIAGNOSIS — I10 BENIGN HYPERTENSION: ICD-10-CM

## 2022-01-19 LAB
ALBUMIN SERPL BCP-MCNC: 4 G/DL (ref 3–5.2)
ALP SERPL-CCNC: 100 U/L (ref 43–122)
ALT SERPL W P-5'-P-CCNC: 16 U/L
ANION GAP SERPL CALCULATED.3IONS-SCNC: 5 MMOL/L (ref 5–14)
AST SERPL W P-5'-P-CCNC: 20 U/L (ref 14–36)
BASOPHILS # BLD AUTO: 0.1 THOUSANDS/ΜL (ref 0–0.1)
BASOPHILS NFR BLD AUTO: 1 % (ref 0–1)
BILIRUB SERPL-MCNC: 0.41 MG/DL
BUN SERPL-MCNC: 21 MG/DL (ref 5–25)
CALCIUM SERPL-MCNC: 9.1 MG/DL (ref 8.4–10.2)
CHLORIDE SERPL-SCNC: 108 MMOL/L (ref 97–108)
CHOLEST SERPL-MCNC: 216 MG/DL
CO2 SERPL-SCNC: 27 MMOL/L (ref 22–30)
CREAT SERPL-MCNC: 1.05 MG/DL (ref 0.6–1.2)
EOSINOPHIL # BLD AUTO: 0.2 THOUSAND/ΜL (ref 0–0.4)
EOSINOPHIL NFR BLD AUTO: 3 % (ref 0–6)
ERYTHROCYTE [DISTWIDTH] IN BLOOD BY AUTOMATED COUNT: 15.5 %
GFR SERPL CREATININE-BSD FRML MDRD: 60 ML/MIN/1.73SQ M
GLUCOSE P FAST SERPL-MCNC: 104 MG/DL (ref 70–99)
HCT VFR BLD AUTO: 39.3 % (ref 36–46)
HDLC SERPL-MCNC: 84 MG/DL
HGB BLD-MCNC: 12.7 G/DL (ref 12–16)
LDLC SERPL CALC-MCNC: 114 MG/DL
LYMPHOCYTES # BLD AUTO: 2.7 THOUSANDS/ΜL (ref 0.5–4)
LYMPHOCYTES NFR BLD AUTO: 40 % (ref 25–45)
MCH RBC QN AUTO: 29 PG (ref 26–34)
MCHC RBC AUTO-ENTMCNC: 32.3 G/DL (ref 31–36)
MCV RBC AUTO: 90 FL (ref 80–100)
MONOCYTES # BLD AUTO: 0.4 THOUSAND/ΜL (ref 0.2–0.9)
MONOCYTES NFR BLD AUTO: 6 % (ref 1–10)
NEUTROPHILS # BLD AUTO: 3.4 THOUSANDS/ΜL (ref 1.8–7.8)
NEUTS SEG NFR BLD AUTO: 49 % (ref 45–65)
NONHDLC SERPL-MCNC: 132 MG/DL
PLATELET # BLD AUTO: 354 THOUSANDS/UL (ref 150–450)
PMV BLD AUTO: 8.8 FL (ref 8.9–12.7)
POTASSIUM SERPL-SCNC: 4.3 MMOL/L (ref 3.6–5)
PROT SERPL-MCNC: 7.6 G/DL (ref 5.9–8.4)
RBC # BLD AUTO: 4.37 MILLION/UL (ref 4–5.2)
SODIUM SERPL-SCNC: 140 MMOL/L (ref 137–147)
TRIGL SERPL-MCNC: 88 MG/DL
WBC # BLD AUTO: 6.8 THOUSAND/UL (ref 4.5–11)

## 2022-01-19 PROCEDURE — 36415 COLL VENOUS BLD VENIPUNCTURE: CPT

## 2022-01-19 PROCEDURE — 85025 COMPLETE CBC W/AUTO DIFF WBC: CPT

## 2022-01-19 PROCEDURE — 80053 COMPREHEN METABOLIC PANEL: CPT

## 2022-01-19 PROCEDURE — 80061 LIPID PANEL: CPT

## 2022-01-19 PROCEDURE — 83036 HEMOGLOBIN GLYCOSYLATED A1C: CPT

## 2022-01-19 NOTE — TELEPHONE ENCOUNTER
Patient returning nurses call & status on her procedure authorization; please reach out to her at # [phone]

## 2022-01-20 LAB
EST. AVERAGE GLUCOSE BLD GHB EST-MCNC: 114 MG/DL
HBA1C MFR BLD: 5.6 %

## 2022-01-20 NOTE — TELEPHONE ENCOUNTER
Shaneom for pt to call me back in reference to her calling in about her auth for [facility] scheduled for 1/26/22

## 2022-01-21 ENCOUNTER — APPOINTMENT (OUTPATIENT)
Dept: PHYSICAL THERAPY | Facility: CLINIC | Age: 52
End: 2022-01-21
Payer: MEDICARE

## 2022-01-23 NOTE — RESULT ENCOUNTER NOTE
Her fasting blood Sugar was four points over the normal but otherwise her labs are good   I would recommend trying to avoid a lot of sugar foods and trying to get regular exercise

## 2022-01-24 ENCOUNTER — HOSPITAL ENCOUNTER (OUTPATIENT)
Dept: MAMMOGRAPHY | Facility: MEDICAL CENTER | Age: 52
Discharge: HOME | End: 2022-01-24
Payer: MEDICARE

## 2022-01-24 VITALS — HEIGHT: 61 IN | WEIGHT: 176 LBS | BODY MASS INDEX: 33.23 KG/M2

## 2022-01-24 DIAGNOSIS — Z12.31 BREAST CANCER SCREENING BY MAMMOGRAM: ICD-10-CM

## 2022-01-24 PROCEDURE — 77063 BREAST TOMOSYNTHESIS BI: CPT

## 2022-01-24 PROCEDURE — 77067 SCR MAMMO BI INCL CAD: CPT

## 2022-01-25 ENCOUNTER — OFFICE VISIT (OUTPATIENT)
Dept: PHYSICAL THERAPY | Facility: CLINIC | Age: 52
End: 2022-01-25
Payer: MEDICARE

## 2022-01-25 DIAGNOSIS — M25.561 CHRONIC PAIN OF BOTH KNEES: ICD-10-CM

## 2022-01-25 DIAGNOSIS — M25.562 CHRONIC PAIN OF BOTH KNEES: ICD-10-CM

## 2022-01-25 DIAGNOSIS — M17.12 PRIMARY OSTEOARTHRITIS OF LEFT KNEE: Primary | ICD-10-CM

## 2022-01-25 DIAGNOSIS — G89.29 CHRONIC PAIN OF BOTH KNEES: ICD-10-CM

## 2022-01-25 PROCEDURE — 97110 THERAPEUTIC EXERCISES: CPT

## 2022-01-25 PROCEDURE — 97530 THERAPEUTIC ACTIVITIES: CPT

## 2022-01-25 NOTE — PROGRESS NOTES
Daily Note     Today's date: 2022  Patient name: Dennise Estrada  : 1970  MRN: [id number]  Referring provider: Marvin Olson MD  Dx:   Encounter Diagnosis     ICD-10-CM    1  Primary osteoarthritis of left knee  M17 12    2  Chronic pain of both knees  M25 561     M25 562     G89 29        Start Time: 1455  Stop Time: 1520  Total time in clinic (min): 25 minutes   Subjective: Pt reports to first OPPT apt since unplanned 2 week hiatus from OPPT (death in the family) reporting her knees feel "okay" but notes she feels "terrible" today 2/2 recently receiving flu and COVID vaccines  Objective: See treatment diary below    Assessment: Tolerated treatment well  Table exercises performed concurrent /c MHP to increase positional tolerance  Tx condensed 2/2 pt not feeling well and reporting congestion and nausea after supine position (despite use of MHP and HL position)  Pt demonstrated fatigue post treatment, exhibited good technique with therapeutic exercises and would benefit from continued PT to progress knee flex ROM and LE strength to improve pt's tolerance to transferring and negotiating stairs on a daily basis  Plan: Cont /c PT POC  Progress as tolerated        Precautions: Use of bolster under knees when in supine to avoid aggravation of LB symptoms; GERD; HTN; migraines; piriformis syndrome; L/S DDD; knee OA; anxiety; depression; bipolar disorder  Date           FOTO IE            Re-Eval IE                Manuals                                                               Neuro Re-Ed           Semi tandem foam             Tandem walk                                                                              Ther Ex           Bridges nv 2x10 deferred          SLR nv 2x10 20ea          Heelslides nv 10x5"  5"x10ea          Heel prop nv 2'           Clams nv nv           SL hip abd             Prone hip ext             Prone hang Seated HS (S) with step nv 4x20" supine 20"x5 supine           TKE             DL leg press                          Ther Activity 12/27 1/11 01/25          Recumbent bike nv 6' L0 6' L0           Mini squats, PFR nv            Sidesteps             Monster walks             Step ups             Repeated STS elevated surface nv            Pt Edu AL AL SP                       Gait Training 12/27 1/11 01/25                                    Modalities 12/27 1/11 01/25          MH, prn   10' /c HEIDE Manzanares, PTA

## 2022-01-26 ENCOUNTER — TELEPHONE (OUTPATIENT)
Dept: PAIN MEDICINE | Facility: CLINIC | Age: 52
End: 2022-01-26

## 2022-01-26 NOTE — TELEPHONE ENCOUNTER
Patient called to cancel procedure today 3:15 pm she is not feeling good       Please call back to reschedule     Thank you     [phone]

## 2022-02-01 ENCOUNTER — EVALUATION (OUTPATIENT)
Dept: PHYSICAL THERAPY | Facility: CLINIC | Age: 52
End: 2022-02-01
Payer: COMMERCIAL

## 2022-02-01 DIAGNOSIS — M51.36 DISC DEGENERATION, LUMBAR: ICD-10-CM

## 2022-02-01 DIAGNOSIS — G89.29 CHRONIC PAIN OF BOTH KNEES: ICD-10-CM

## 2022-02-01 DIAGNOSIS — M25.562 CHRONIC PAIN OF BOTH KNEES: ICD-10-CM

## 2022-02-01 DIAGNOSIS — M46.1 SACROILIITIS (HCC): ICD-10-CM

## 2022-02-01 DIAGNOSIS — M25.561 CHRONIC PAIN OF BOTH KNEES: ICD-10-CM

## 2022-02-01 DIAGNOSIS — M79.18 MYOFASCIAL PAIN: ICD-10-CM

## 2022-02-01 DIAGNOSIS — M17.12 PRIMARY OSTEOARTHRITIS OF LEFT KNEE: Primary | ICD-10-CM

## 2022-02-01 DIAGNOSIS — G57.01 PIRIFORMIS SYNDROME OF RIGHT SIDE: ICD-10-CM

## 2022-02-01 PROCEDURE — 97530 THERAPEUTIC ACTIVITIES: CPT

## 2022-02-01 PROCEDURE — 97164 PT RE-EVAL EST PLAN CARE: CPT

## 2022-02-01 NOTE — PROGRESS NOTES
PT Evaluation  and PT Re-Evaluation     Today's date: 2022  Patient name: Sahara Rodas  : 1970  MRN: [id number]  Referring provider: Mandy Ravi MD  Dx:   Encounter Diagnosis     ICD-10-CM    1  Primary osteoarthritis of left knee  M17 12    2  Chronic pain of both knees  M25 561     M25 562     G89 29    3  Piriformis syndrome of right side  G57 01    4  Disc degeneration, lumbar  M51 36    5  Sacroiliitis ([address] )  M46 1    6  Myofascial pain  M79 18                   Assessment  Assessment details: To date, Ms Carter Pang has participated in a total of 4 skilled therapy sessions for tx of chronic pain of both knees and primary osteoarthritis of left knee  Upon reassessment today, she is making steady progress toward her goals  Objectively, she demonstrates increasing stability during tandem stance; improved gait; increasing LE strength; increasing (L) knee flex/ext AROM; and improving gastroc/HS flexibility  Subjectively, she reports an improving ability walking daily  She notes continued difficulty walking prolonged periods of time; transferring; sleeping; negotiating stairs; and working (pt has stopped working)  She continues to present with mild gait deviations; decreased stability during tandem stance on (L); deviations while performing sit to stand; decreased/painful (L) knee flex/ext AROM; decreased LE strength; and decreased HS/gastroc flexibility  She would therefore benefit from 4 more weeks of PT intervention in order to address the aforementioned deficits so that she can return to her PLOF and function comfortably/safely in her home and surrounding environment  Ms Carter Pang additionally presents today for evaluation of the lower back  Upon assessment, pt exhibits decreased/painful thoracolumbar and C/S AROM  She also presents with slightly decreased piriformis flexibility (in addition to impairments listed above)   These impairments are contributing to functional limitations with bending; lifting; cleaning; and staying in one position for prolonged periods of time  Pt would therefore benefit from 4 weeks of PT intervention in order to address the aforementioned deficits so that he or she can return to PLOF and function comfortably/safely in home and surrounding environment  Thank you for the referral! - Mary Jo Florentino, PT, DPT  Impairments: abnormal gait, abnormal or restricted ROM, abnormal movement, impaired balance, impaired physical strength, pain with function and poor body mechanics    Symptom irritability: moderateUnderstanding of Dx/Px/POC: good   Prognosis: good    Goals  STG 1: Pt will demonstrate compliance with HEP to supplement therapy in 1-2 weeks  PROGRESSING  STG 2: Pt will report 25% reduction in pain in 2-4 weeks  SLOWLY PROGRESSING  LTG 1: Pt will be able to sleep comfortably at night with min difficulty related to the (L) knee in 6-8 weeks  NOT MET  LTG 2: Pt will be able to work with min increased symptoms in 6-8 weeks  NOT MET - pt has stopped working because of her knees   LTG 3: Pt will demonstrate increased (L) knee flex/ext AROM by 25% in 6-8 weeks to maximize post-operative outcomes  MET  LTG 4: Increase (L) quad strength to 5/5 to facilitate normalized gait mechanics and reduce bouts of hyperextension when ambulating in 6-8 weeks  PROGRESSING - pt reports reduced incidence of buckling, states that it has not happened in the last week or so  LTG 5: Pt will verbalize improved awareness of bending/lifting mechanics in 6-8 weeks  NEW  LTG 6: Pt will be able to sit comfortably for 1-2 hours with min increased symptoms in 6-8 weeks  NEW  LTG 7: Pt will report min-mod levels of LBP with functional activities in 6-8 weeks  NEW  LTG 8: Pt will be able to clean her home with minimal difficulty related to the LB in 6-8 weeks   NEW    Plan  Plan details: Educated pt today about PT POC; PT goals; prognosis; progress to date; and relationship of different body parts during and in response to elevated pain levels  Perform FOTO for the knees/lower back at NV - not able to perform today secondary to time constraints  Patient would benefit from: skilled physical therapy  Planned modality interventions: cryotherapy, TENS and unattended electrical stimulation  Planned therapy interventions: abdominal trunk stabilization, postural training, patient education, neuromuscular re-education, joint mobilization, manual therapy, massage, activity modification, balance, body mechanics training, Crenshaw taping, strengthening, stretching, therapeutic activities, coordination, flexibility, home exercise program, therapeutic exercise, functional ROM exercises, gait training and balance/weight bearing training  Frequency: 2x week  Duration in visits: 8  Duration in weeks: 4  Treatment plan discussed with: patient        Subjective Evaluation    History of Present Illness  Mechanism of injury: Re-eval 2/1: Pt states that since coming to therapy, she can walk a little bit better  She also denies any recent bouts of buckling/hyperextension thru the knee in the last week or so  She has been using the cane more often for ambulation which she believes is helping  Lately, however, the (R) knee has been bothering her more especially when she is sleeping (reporting numbness thru the right thigh)  She continues to report difficulty negotiating stairs (avoids when possible - takes the elevator) and transferring (requires UE assist) d/t ongoing knee pain  In regards to the lower back, pt reports a hx of chronic LBP for many years which recently transferred from the (R) side to the (L) (stating that the pain seems to move)  Her pain first began after a fall out of a window when she was younger  She also has been experiencing progressively worsening pain thru the C/S over the years which is now affecting a lot of ADLs   She reports difficulty staying in one position for too long as when sitting or sleeping; bending; and lifting as a result  She is able to clean her apartment however it takes her a long time because she has to take a lot of breaks  IE: Pt reports a hx of KIMI knee OA  The (L) knee is worst than the (R) so she needs a total knee replacement  Before she can schedule this, she first has to quit smoking  Currently, pt reports difficulty sleeping; working (pt cleans houses); and completing virtually all ADLs  Pt also reports occasional instability and giving way of the (L) knee, most recently which happened last week  Eases: Most heat, resting  Aggs: End of the day  Pain  Pain scale: Knees: 6/10; Back: 7/10  Pain scale at lowest: Knees: 4/10; Back: 8/10  Pain scale at highest: Knees: 10/10; Back: 10/10  Location: Pt reports pain thru the (L) anterior knee and (R) anterior knee/thigh  Pt also reports pain across KIMI lower back and numbness thru the (R) thigh  Additionally, she reports pain thru the posterior neck and occasional N/T thru the (R) UE      Patient Goals  Patient goals for therapy: increased motion and increased strength          Objective     Active Range of Motion   Cervical/Thoracic Spine       Cervical    Flexion:  with pain Restriction level: maximal  Extension:  with pain Restriction level: maximal  Left lateral flexion:  Restriction level: minimal  Right lateral flexion:  WFL  Left rotation:  Restriction level: minimal  Right rotation:  Restriction level: minimal    Thoracic    Flexion:  with pain Restriction level: maximal  Extension:  WFL  Left rotation:  Restriction level: minimal  Right rotation:  Restriction level: minimal    Lumbar   Flexion:  with pain Restriction level: maximal  Extension:  with pain Restriction level: minimal  Left lateral flexion:  WFL  Right lateral flexion:  WFL  Left rotation:  with pain Restriction level: minimal  Right rotation:  Restriction level: minimal  Left Knee   Flexion: 130 degrees with pain  Extension: -12 degrees with pain    Right Knee   Flexion: 140 degrees   Extension: 8 degrees     Additional Active Range of Motion Details  HS flexibility mod dec and painful on (R), mod dec and painful on (L)  LTG: Min dec    Gastroc flexibility WNL on (R), min dec on (L)  Piriformis flexibility min dec KIMI  Strength/Myotome Testing     Left Knee   Flexion: 4-  Extension: 4    Right Knee   Flexion: 4+  Extension: 4+    Tests     Lumbar     Left   Positive passive SLR  Right   Positive passive SLR  General Comments:      Knee Comments  Gait: Foot flat contact KIMI; decreased trunk rotation; slowed gait speed (no longer antalgic)    Tandem stance on (R): 15 secs, steady, min to no sway  Tandem stance on (L): 10 secs, required multiple attempts, (L) knee pain  Sit to stand transfer: Slowed speed, UE assist, decreased (L) knee flex                   Precautions: GERD; HTN; migraines; piriformis syndrome; L/S DDD; knee OA; anxiety; depression; bipolar disorder  Date 12/27 1/11 01/25 2/1         FOTO IE   NEXT VISIT         Re-Eval IE   xx             Manuals 12/27 1/11 01/25 2/1         Re-Eval (Knees) + Eval (Back)    AL                                                Neuro Re-Ed 12/27 1/11 01/25 2/1         Semi tandem foam             Tandem walk             C/S Retractions    nv         Scap retractions    nv         No moneys             Tband rows/ext    nv         Palloff press             Shoulder taps             TVA Contractions    nv         TVA + Add Squeeze    nv         TVA + BKFO    nv         TVA + Supine march                                       Ther Ex 12/27 1/11 01/25 2/1         Bridges nv 2x10 deferred          SLR nv 2x10 20ea          Heelslides nv 10x5"  5"x10ea          Heel prop nv 2'           Clams nv nv  nv         SL hip abd             Prone hip ext             Seated HS (S) with step nv 4x20" supine 20"x5 supine           Seated T/S Ext    nv         ROSA over table    nv         DL leg press                          Ther Activity 12/27 1/11 01/25 2/1         Recumbent bike nv 6' L0 6' L0  nv         Mini squats, PFR nv            Sidesteps    nv         Monster walks             Step ups             Repeated STS elevated surface nv   nv         Seated hip hinge with SWB    nv         Pt Edu AL AL SP AL                      Gait Training 12/27 1/11 01/25 2/1                                   Modalities 12/27 1/11 01/25 2/1         RED, prn   10' /c TE

## 2022-02-01 NOTE — TELEPHONE ENCOUNTER
Pt would like to reschedule her procedure as soon as possible she is in a lot of pain  She has a new card [facility]      Pt # [phone]

## 2022-02-04 ENCOUNTER — APPOINTMENT (OUTPATIENT)
Dept: PHYSICAL THERAPY | Facility: CLINIC | Age: 52
End: 2022-02-04
Payer: COMMERCIAL

## 2022-02-08 ENCOUNTER — OFFICE VISIT (OUTPATIENT)
Dept: PHYSICAL THERAPY | Facility: CLINIC | Age: 52
End: 2022-02-08
Payer: COMMERCIAL

## 2022-02-08 DIAGNOSIS — M25.562 CHRONIC PAIN OF BOTH KNEES: ICD-10-CM

## 2022-02-08 DIAGNOSIS — M51.36 DISC DEGENERATION, LUMBAR: ICD-10-CM

## 2022-02-08 DIAGNOSIS — G89.29 CHRONIC PAIN OF BOTH KNEES: ICD-10-CM

## 2022-02-08 DIAGNOSIS — G57.01 PIRIFORMIS SYNDROME OF RIGHT SIDE: ICD-10-CM

## 2022-02-08 DIAGNOSIS — M25.561 CHRONIC PAIN OF BOTH KNEES: ICD-10-CM

## 2022-02-08 DIAGNOSIS — M79.18 MYOFASCIAL PAIN: ICD-10-CM

## 2022-02-08 DIAGNOSIS — M17.12 PRIMARY OSTEOARTHRITIS OF LEFT KNEE: Primary | ICD-10-CM

## 2022-02-08 DIAGNOSIS — M46.1 SACROILIITIS (HCC): ICD-10-CM

## 2022-02-08 PROCEDURE — 97530 THERAPEUTIC ACTIVITIES: CPT

## 2022-02-08 PROCEDURE — 97112 NEUROMUSCULAR REEDUCATION: CPT

## 2022-02-08 NOTE — PROGRESS NOTES
Daily Note     Today's date: 2022  Patient name: Jackie Solis  : 1970  MRN: [id number]  Referring provider: Owen Evans MD  Dx:   Encounter Diagnosis     ICD-10-CM    1  Primary osteoarthritis of left knee  M17 12    2  Chronic pain of both knees  M25 561     M25 562     G89 29    3  Piriformis syndrome of right side  G57 01    4  Disc degeneration, lumbar  M51 36    5  Sacroiliitis ([address] )  M46 1    6  Myofascial pain  M79 18        Start Time: 1504  Stop Time: 1535  Total time in clinic (min): 31 minutes  Subjective: Pt arrives to Tx reporting her body feels "okay" and that she feels "achey"  Pt notes she has a HA today  Objective: See treatment diary below    Assessment: Pt tolerated Tx fair reporting increased LBP Sx following supine/HL positioning - pt also reports that she believes bike warm up agitated pain Sx - discussed /c pt trying some standing TE NV and holding bike warm up  Provided pt education re: PT POC and progression of program as tolerated, role and anatomy of TVA muscle  Post Tx pt notes that she feels better when fwd flexed - discussed trying seated hip hinge/pball flex NV - pt defers further exercises NV  Pt would benefit from continued PT  Plan: Cont /c PT POC  Progress as tolerated        Precautions: GERD; HTN; migraines; piriformis syndrome; L/S DDD; knee OA; anxiety; depression; bipolar disorder  Date         FOTO IE   NEXT VISIT         Re-Eval IE   xx             Manuals         Re-Eval (Knees) + Eval (Back)    AL                                                Neuro Re-Ed         Semi tandem foam             Tandem walk             C/S Retractions    nv         Scap retractions    nv         No moneys             Tband rows/ext    nv         Palloff press             Shoulder taps             TVA Contractions    nv 5"x15        TVA + Add Squeeze    nv 10"x10        TVA + BKFO    nv 15ea        TVA + Supine march                                       Ther Ex 12/27 1/11 01/25 2/1 02/08        Bridges nv 2x10 deferred          SLR nv 2x10 20ea          Heelslides nv 10x5"  5"x10ea          Heel prop nv 2'           Clams nv nv  nv         SL hip abd             Prone hip ext             Seated HS (S) with step nv 4x20" supine 20"x5 supine           Seated T/S Ext    nv         ROSA over table    nv         DL leg press                          Ther Activity 12/27 1/11 01/25 2/1 02/08        Recumbent bike nv 6' L0 6' L0  nv 6' L0        Mini squats, PFR nv            Sidesteps    nv         Monster walks             Step ups             Repeated STS elevated surface nv   nv         Seated hip hinge with SWB    nv         Pt Edu AL AL SP AL SP                     Gait Training 12/27 1/11 01/25 2/1 02/08                                  Modalities 12/27 1/11 01/25 2/1 02/08        MH, prn   10' /c TE  /c HEIDE Cruz, PTA

## 2022-02-10 ENCOUNTER — OFFICE VISIT (OUTPATIENT)
Dept: PHYSICAL THERAPY | Facility: CLINIC | Age: 52
End: 2022-02-10
Payer: COMMERCIAL

## 2022-02-10 DIAGNOSIS — G57.01 PIRIFORMIS SYNDROME OF RIGHT SIDE: ICD-10-CM

## 2022-02-10 DIAGNOSIS — G89.29 CHRONIC PAIN OF BOTH KNEES: ICD-10-CM

## 2022-02-10 DIAGNOSIS — M25.562 CHRONIC PAIN OF BOTH KNEES: ICD-10-CM

## 2022-02-10 DIAGNOSIS — M25.561 CHRONIC PAIN OF BOTH KNEES: ICD-10-CM

## 2022-02-10 DIAGNOSIS — M17.12 PRIMARY OSTEOARTHRITIS OF LEFT KNEE: ICD-10-CM

## 2022-02-10 DIAGNOSIS — M51.36 DISC DEGENERATION, LUMBAR: Primary | ICD-10-CM

## 2022-02-10 DIAGNOSIS — M79.18 MYOFASCIAL PAIN: ICD-10-CM

## 2022-02-10 DIAGNOSIS — M46.1 SACROILIITIS (HCC): ICD-10-CM

## 2022-02-10 PROCEDURE — 97112 NEUROMUSCULAR REEDUCATION: CPT

## 2022-02-10 PROCEDURE — 97530 THERAPEUTIC ACTIVITIES: CPT

## 2022-02-10 NOTE — PROGRESS NOTES
Daily Note     Today's date: 2/10/2022  Patient name: Nicole Page  : 1970  MRN: [id number]  Referring provider: Gianni Boykin MD  Dx:   Encounter Diagnosis     ICD-10-CM    1  Disc degeneration, lumbar  M51 36    2  Primary osteoarthritis of left knee  M17 12    3  Sacroiliitis ([facility] [address] )  M46 1    4  Piriformis syndrome of right side  G57 01    5  Chronic pain of both knees  M25 561     M25 562     G89 29    6  Myofascial pain  M79 18                   Subjective: Pt reports a headache today as well as N/T thru the (R) hand which has been occurring since   Objective: See treatment diary below      Assessment: Tolerated treatment fair  Implemented several exercises to progress postural stability given pt's subjective complaints  Pt with difficulty tolerating supine position for long today secondary to c/o pain  Pt did respond (+) to trial of ROSA, reporting abolishment of posterior (R) LE pain which began after adductor squeezes  Pt also demonstrated a positive response to use of MH in prone, reporting improved LBP after use  Post tx session, she also c/o sharp pain thru the (L) knee  Educated pt today about concepts of centralization vs peripheralization; goals of repeated movements; potential areas of neural compression; role of posture; and POC  Patient demonstrated fatigue post treatment, exhibited good technique with therapeutic exercises and would benefit from continued PT to progress core stability to improve pt's tolerance to standing and sitting prolonged periods of time  Plan: Continue per plan of care  Progress treatment as tolerated         Precautions: GERD; HTN; migraines; piriformis syndrome; L/S DDD; knee OA; anxiety; depression; bipolar disorder  Date 12/27 1/11 01/25 2/1 02/08 2/10       FOTO IE   NEXT VISIT  xx       Re-Eval IE   xx             Manuals 12/27 1/11 01/25 2/1 02/08 2/10       Re-Eval (Knees) + Eval (Back)    AL                                                Neuro Re-Ed 12/27 1/11 01/25 2/1 02/08 2/10       Semi tandem foam             Tandem walk             C/S Retractions    nv  10x3"        Scap retractions    nv  20x3"       No moneys      YTB 20x3"       Tband rows/ext    nv  GTB 20x3"       Palloff press             Shoulder taps             TVA Contractions    nv 5"x15 10x10"       Posterior pelvic tilt      nv       TVA + Add Squeeze    nv 10"x10 10x10"       TVA + BKFO    nv 15ea        TVA + Supine march                                       Ther Ex 12/27 1/11 01/25 2/1 02/08 2/10       Bridges nv 2x10 deferred          SLR nv 2x10 20ea          Heelslides nv 10x5"  5"x10ea          Heel prop nv 2'           Clams nv nv  nv  nv       SL hip abd             Prone hip ext             Seated HS (S) with step nv 4x20" supine 20"x5 supine           Seated T/S Ext    nv  nv       ROSA over table    nv  2x10       DL leg press                          Ther Activity 12/27 1/11 01/25 2/1 02/08 2/10       Recumbent bike nv 6' L0 6' L0  nv 6' L0 5' L0       Mini squats, PFR nv            Sidesteps    nv  attempted, held P!        Monster walks             Step ups             Repeated STS elevated surface nv   nv         Seated hip hinge with SWB    nv         Pt Edu AL AL SP AL SP AL                    Gait Training 12/27 1/11 01/25 2/1 02/08 2/10                                 Modalities 12/27 1/11 01/25 2/1 02/08 2/10       MH, prn   10' /c TE  /c TE  Prone 10' post tx

## 2022-02-15 ENCOUNTER — OFFICE VISIT (OUTPATIENT)
Dept: PHYSICAL THERAPY | Facility: CLINIC | Age: 52
End: 2022-02-15
Payer: COMMERCIAL

## 2022-02-15 DIAGNOSIS — M25.562 CHRONIC PAIN OF BOTH KNEES: ICD-10-CM

## 2022-02-15 DIAGNOSIS — M17.12 PRIMARY OSTEOARTHRITIS OF LEFT KNEE: Primary | ICD-10-CM

## 2022-02-15 DIAGNOSIS — G89.29 CHRONIC PAIN OF BOTH KNEES: ICD-10-CM

## 2022-02-15 DIAGNOSIS — G57.01 PIRIFORMIS SYNDROME OF RIGHT SIDE: ICD-10-CM

## 2022-02-15 DIAGNOSIS — M79.18 MYOFASCIAL PAIN: ICD-10-CM

## 2022-02-15 DIAGNOSIS — M51.36 DISC DEGENERATION, LUMBAR: ICD-10-CM

## 2022-02-15 DIAGNOSIS — M25.561 CHRONIC PAIN OF BOTH KNEES: ICD-10-CM

## 2022-02-15 DIAGNOSIS — M46.1 SACROILIITIS (HCC): ICD-10-CM

## 2022-02-15 PROCEDURE — 97110 THERAPEUTIC EXERCISES: CPT

## 2022-02-15 PROCEDURE — 97530 THERAPEUTIC ACTIVITIES: CPT

## 2022-02-15 NOTE — PROGRESS NOTES
Daily Note     Today's date: 2/15/2022  Patient name: James Gomez  : 1970  MRN: [id number]  Referring provider: Kindra Ibarra MD  Dx:   Encounter Diagnosis     ICD-10-CM    1  Primary osteoarthritis of left knee  M17 12    2  Sacroiliitis ([address] )  M46 1    3  Disc degeneration, lumbar  M51 36    4  Piriformis syndrome of right side  G57 01    5  Chronic pain of both knees  M25 561     M25 562     G89 29    6  Myofascial pain  M79 18        Start Time: 1600  Stop Time: 3671  Total time in clinic (min): 31 minutes  Subjective: Pt denies adverse Sx following additions to exercise diary LV - pt reports radicular Sx into her (R)hand have decreased as well as some of her C/S pain Sx  Pt requests to begin on UBE today to avoid bike 2/2 having increased (B/L)LE pain Sx today -notes her (R) knee locked up on her /c sharp pain last night while laying in bed; despite this pain, pt would like to work on conditioning and strengthening her legs hips  Pt arrives to Tx 1 5hr early - able to accommodate  Objective: See treatment diary below    Assessment: Tolerated treatment well including additions to exercise diary  Added prone HS curl 2/2 pt demonstrating (B/L) hamstring weakness and in ability to perform 1 prone glute max rep - pt reports fatigue in HS's and through glute/back during prone HS curls  Prone activities performed /c pillow under abdomen for comfort - pt reports improved positional tolerance /c modification  Pt reports her (R) hip feels "looser" post Tx and denies increased LBP sx following Tx  Pt demonstrated fatigue post treatment, exhibited good technique with therapeutic exercises and would benefit from continued PT to progress core stability to improve pt's tolerance to standing and sitting prolonged periods of time  Plan: Cont /c PT POC  Progress as tolerated        Precautions: GERD; HTN; migraines; piriformis syndrome; L/S DDD; knee OA; anxiety; depression; bipolar disorder  Date  1/11 01/25 2/1 02/08 2/10 02/15      Visit 1 2 3 4 5 6 7      FOTO IE   NEXT VISIT  xx       Re-Eval IE   xx             Manuals 12/27 1/11 01/25 2/1 02/08 2/10 02/15      Re-Eval (Knees) + Eval (Back)    AL                      Neuro Re-Ed 12/27 1/11 01/25 2/1 02/08 2/10 02/15      Semi tandem foam             Tandem walk             C/S Retractions    nv  10x3"        Scap retractions    nv  20x3"       No moneys      YTB 20x3"       Tband rows/ext    nv  GTB 20x3"       Palloff press             Shoulder taps             TVA Contractions    nv 5"x15 10x10"       Posterior pelvic tilt      nv       TVA + Add Squeeze    nv 10"x10 10x10"       TVA + BKFO    nv 15ea        TVA + Supine march                                       Ther Ex 12/27 1/11 01/25 2/1 02/08 2/10 02/15      Bridges nv 2x10 deferred    20ea      SLR nv 2x10 20ea    20ea /c strap      Heelslides nv 10x5"  5"x10ea          Heel prop nv 2'           Clams nv nv  nv  nv 20ea      SL hip abd             Prone hip ext       20ea       Seated HS (S) step nv 4x20" supine 20"x5 supine           Seated T/S Ext    nv  nv       ROSA over table    nv  2x10       DL leg press             LAQ       20ea      Prone HS Curl       20ea      Ther Activity 12/27 1/11 01/25 2/1 02/08 2/10 02/15      Recumbent bike nv 6' L0 6' L0  nv 6' L0 5' L0 deferred      UBE       3'/3'      Mini squats, PFR nv            Sidesteps    nv  attempted, held P!        Monster walks             Step ups             Repeated STS elevated surface nv   nv         Seated hip hinge SWB    nv         Pt Edu AL AL SP AL SP AL SP                   Gait Training 12/27 1/11 01/25 2/1 02/08 2/10 02/15                                Modalities 12/27 1/11 01/25 2/1 02/08 2/10 02/15      MH, prn   10' /c TE  /c TE  Prone 10' post tx                        Jackalyn Heading, PTA

## 2022-02-17 ENCOUNTER — OFFICE VISIT (OUTPATIENT)
Dept: PHYSICAL THERAPY | Facility: CLINIC | Age: 52
End: 2022-02-17
Payer: COMMERCIAL

## 2022-02-17 DIAGNOSIS — M17.12 PRIMARY OSTEOARTHRITIS OF LEFT KNEE: Primary | ICD-10-CM

## 2022-02-17 DIAGNOSIS — M79.18 MYOFASCIAL PAIN: ICD-10-CM

## 2022-02-17 DIAGNOSIS — M51.36 DISC DEGENERATION, LUMBAR: ICD-10-CM

## 2022-02-17 DIAGNOSIS — G57.01 PIRIFORMIS SYNDROME OF RIGHT SIDE: ICD-10-CM

## 2022-02-17 DIAGNOSIS — M25.562 CHRONIC PAIN OF BOTH KNEES: ICD-10-CM

## 2022-02-17 DIAGNOSIS — M25.561 CHRONIC PAIN OF BOTH KNEES: ICD-10-CM

## 2022-02-17 DIAGNOSIS — G89.29 CHRONIC PAIN OF BOTH KNEES: ICD-10-CM

## 2022-02-17 DIAGNOSIS — M46.1 SACROILIITIS (HCC): ICD-10-CM

## 2022-02-17 PROCEDURE — 97530 THERAPEUTIC ACTIVITIES: CPT

## 2022-02-17 PROCEDURE — 97112 NEUROMUSCULAR REEDUCATION: CPT

## 2022-02-17 NOTE — PROGRESS NOTES
Daily Note     Today's date: 2022  Patient name: Christine Hayes  : 1970  MRN: [id number]  Referring provider: Bogdan Meadows MD  Dx:   Encounter Diagnosis     ICD-10-CM    1  Primary osteoarthritis of left knee  M17 12    2  Sacroiliitis ([address] )  M46 1    3  Chronic pain of both knees  M25 561     M25 562     G89 29    4  Myofascial pain  M79 18    5  Disc degeneration, lumbar  M51 36    6  Piriformis syndrome of right side  G57 01        Start Time: 1602        Subjective: Pt reports having significant tightness/pain behind (R) knee that started after LV - pt admits she did not perform HEP or do much following LV  Pt requests to no longer perform bike warm up 2/2 believing it exacerbates her LE pain Sx  Pt admits she is not very active at the moment but wants to continue /c OPPT to prepare for upcoming (L) knee surgery  Objective: See treatment diary below    Assessment: Pt tolerated Tx fair - notes some reduction in (R) knee pain Sx following LE stretching  Pt tolerated UE exercises well today and denies pain /c these interventions  Pt reports (+) response to pball rollout stretches noting her LB felt "better" with this stretch  Upon palpation of gastroc muscle belly and tendon origins pt reports TTP that feels better with medium pressure noting "that feels better now"; pt also notes some TTP along distal ITB  Provided pt education re: benefit of continuing /c LE strengthening but performing more conservative program as tolerated, sedentary vs active lifestyle and how this can impact carryover and response to PT  Pt demonstrated fatigue post treatment, exhibited good technique with therapeutic exercises and would benefit from continued PT to progress core stability to improve pt's tolerance to standing and sitting prolonged periods of time  Plan: Cont /c PT POC  Progress as tolerated        Precautions: GERD; HTN; migraines; piriformis syndrome; L/S DDD; knee OA; anxiety; depression; bipolar disorder  Date 12/27 1/11 01/25 2/1 02/08 2/10 02/15 02/17     Visit 1 2 3 4 5 6 7 8     FOTO IE   NEXT VISIT  xx       Re-Eval IE   xx             Manuals 12/27 1/11 01/25 2/1 02/08 2/10 02/15 02/17     Re-Eval (Knees) + Eval (Back)    AL                      Neuro Re-Ed 12/27 1/11 01/25 2/1 02/08 2/10 02/15 02/17     Semi tandem foam             Tandem walk             C/S Retractions    nv  10x3"  5"x20 5"x20     Scap retractions    nv  20x3" 5"x20 5"x20     No moneys      YTB 20x3"  GTBx20     Tband rows/ext    nv  GTB 20x3"  BTBx20     Palloff press             Shoulder taps             TVA Contractions    nv 5"x15 10x10"       Posterior pelvic tilt      nv       TVA + Add Squeeze    nv 10"x10 10x10"       TVA + BKFO    nv 15ea        TVA + Supine march                                       Ther Ex 12/27 1/11 01/25 2/1 02/08 2/10 02/15 02/17     Bridges nv 2x10 deferred    20ea      SLR nv 2x10 20ea    20ea /c strap      Heelslides nv 10x5"  5"x10ea          Heel prop nv 2'           Clams nv nv  nv  nv 20ea      SL hip abd             Prone hip ext       20ea       Seated HS (S) step nv 4x20" supine 20"x5 supine           Seated T/S Ext    nv  nv       ROSA over table    nv  2x10       DL leg press             LAQ       20ea      Prone HS Curl       20ea      Ther Activity 12/27 1/11 01/25 2/1 02/08 2/10 02/15 02/17     Recumbent bike nv 6' L0 6' L0  nv 6' L0 5' L0 deferred      UBE       3'/3' 3'/3'     Mini squats, PFR nv            Sidesteps    nv  attempted, held P!        Monster walks             Step ups             Repeated STS elevated surface nv   nv         Seated hip hinge SWB    nv    3'      Pt Edu AL AL SP AL SP AL SP SP                  Gait Training 12/27 1/11 01/25 2/1 02/08 2/10 02/15 02/17                               Modalities 12/27 1/11 01/25 2/1 02/08 2/10 02/15 02/17     MH, prn   10' /c TE  /c TE  Prone 10' post tx                        Vidhya Blind, PTA

## 2022-02-22 ENCOUNTER — APPOINTMENT (OUTPATIENT)
Dept: PHYSICAL THERAPY | Facility: CLINIC | Age: 52
End: 2022-02-22
Payer: COMMERCIAL

## 2022-03-01 ENCOUNTER — TELEPHONE (OUTPATIENT)
Dept: FAMILY MEDICINE CLINIC | Facility: CLINIC | Age: 52
End: 2022-03-01

## 2022-03-01 ENCOUNTER — OFFICE VISIT (OUTPATIENT)
Dept: FAMILY MEDICINE CLINIC | Facility: CLINIC | Age: 52
End: 2022-03-01

## 2022-03-01 VITALS
SYSTOLIC BLOOD PRESSURE: 132 MMHG | TEMPERATURE: 97.8 F | HEIGHT: 61 IN | WEIGHT: 181 LBS | HEART RATE: 73 BPM | BODY MASS INDEX: 34.17 KG/M2 | DIASTOLIC BLOOD PRESSURE: 80 MMHG | OXYGEN SATURATION: 98 % | RESPIRATION RATE: 18 BRPM

## 2022-03-01 DIAGNOSIS — G43.709 CHRONIC MIGRAINE WITHOUT AURA WITHOUT STATUS MIGRAINOSUS, NOT INTRACTABLE: ICD-10-CM

## 2022-03-01 DIAGNOSIS — E88.81 METABOLIC SYNDROME: ICD-10-CM

## 2022-03-01 DIAGNOSIS — I10 BENIGN HYPERTENSION: Primary | ICD-10-CM

## 2022-03-01 PROBLEM — E88.810 METABOLIC SYNDROME: Status: ACTIVE | Noted: 2022-03-01

## 2022-03-01 PROCEDURE — 99214 OFFICE O/P EST MOD 30 MIN: CPT | Performed by: NURSE PRACTITIONER

## 2022-03-01 PROCEDURE — 3079F DIAST BP 80-89 MM HG: CPT | Performed by: NURSE PRACTITIONER

## 2022-03-01 PROCEDURE — 3075F SYST BP GE 130 - 139MM HG: CPT | Performed by: NURSE PRACTITIONER

## 2022-03-01 RX ORDER — AMLODIPINE BESYLATE AND BENAZEPRIL HYDROCHLORIDE 10; 20 MG/1; MG/1
1 CAPSULE ORAL DAILY
Qty: 30 CAPSULE | Refills: 5 | Status: SHIPPED | OUTPATIENT
Start: 2022-03-01 | End: 2022-03-10

## 2022-03-01 RX ORDER — TOPIRAMATE 25 MG/1
50 TABLET ORAL 2 TIMES DAILY
Qty: 180 TABLET | Refills: 0 | Status: SHIPPED | OUTPATIENT
Start: 2022-03-01 | End: 2022-07-15 | Stop reason: SDUPTHER

## 2022-03-01 NOTE — ASSESSMENT & PLAN NOTE
· International Diabetes fair aeration and American Heart Association diagnostic criteria metabolic syndrome: Elevated FBG+LDL+high blood pressure+central obesity  Complicated by smoking  · Per guidelines from Endocrine Society, if pt does not respond to lifestyle modifications from weight management, which this patient has enacted w/o results, metformin is first line medication  Per orders

## 2022-03-01 NOTE — PATIENT INSTRUCTIONS
DASH Eating Plan   WHAT YOU NEED TO KNOW:   The DASH (Dietary Approaches to Stop Hypertension) Eating Plan is designed to help prevent or lower high blood pressure  It can also help to lower LDL (bad) cholesterol and decrease your risk for heart disease  The plan is low in sodium, sugar, unhealthy fats, and total fat  It is high in potassium, calcium, magnesium, and fiber  These nutrients are added when you eat more fruits, vegetables, and whole grains  With the DASH eating plan, you need to eat a certain number of servings from each food group  This will help you get enough of certain nutrients and limit others  The amount of servings you should eat depends on how many calories you need  Your dietitian can      DISCHARGE INSTRUCTIONS:   What you need to know about sodium:  Your dietitian will tell you how much sodium is safe for you to have each day  People with high blood pressure should have no more than 1,500 to 2,300 mg of sodium in a day  A teaspoon (tsp) of salt has 2,300 mg of sodium  This may seem like a difficult goal, but small changes to the foods you eat can make a big difference  Your healthcare provider or dietitian can help you create a meal plan that follows your sodium limit  · Read food labels  Food labels can help you choose foods that are low in sodium  The amount of sodium is listed in milligrams (mg)  The % Daily Value (DV) column tells you how much of your daily needs are met by 1 serving of the food for each nutrient listed  Choose foods that have less than 5% of the DV of sodium  These foods are considered low in sodium  Foods that have 20% or more of the DV of sodium are considered high in sodium  Avoid foods that have more than 300 mg of sodium in each serving  Choose foods that say low-sodium, reduced-sodium, or no salt added on the food label  · Limit added salt  Do not salt food at the table if you add salt when you cook   Use herbs and spices, such as onions, garlic, and salt-free seasonings to add flavor  Try lemon or lime juice or vinegar to add a tart flavor  Use hot peppers or a small amount of hot pepper sauce to add a spicy flavor  Limit foods high in added salt, such as the following:    ? Seasonings made with salt, such as garlic salt, celery salt, onion salt, seasoned salt, meat tenderizers, and monosodium glutamate (MSG)    ? Miso soup and canned or dried soup mixes    ? Regular soy sauce, barbecue sauce, teriyaki sauce, steak sauce, Worcestershire sauce, and most flavored vinegars    ? Snack foods, such as salted chips, popcorn, pretzels, pork rinds, salted crackers, and salted nuts    ? Frozen foods, such as dinners, entrees, vegetables with sauces, and breaded meats    · Ask about salt substitutes  Ask your healthcare provider if you may use salt substitutes  Some salt substitutes have ingredients that can be harmful if you have certain health conditions  · Choose foods carefully at restaurants  Meals from restaurants, especially fast food restaurants, are often high in sodium  Some restaurants have nutrition information that tells you the amount of sodium in their foods  Ask to have your food prepared with less, or no salt  What you need to know about fats:  Healthy fats include unsaturated fats and omega-3 fatty acids  Unhealthy fats include saturated fats and trans fats  · Include healthy fats, such as the following:      ? Cooking oils, such as soybean, canola, olive, or sunflower    ? Fatty fish, such as salmon, tuna, mackerel, or sardines    ? Flaxseed oil or ground flaxseed    ? ½ cup of cooked beans, such as black beans, kidney beans, or aaron beans    ? 1½ ounces of low-sodium nuts, such as almonds or walnuts    ? Low-sugar, low-sodium peanut butter    ? Seeds such as dariel seeds or sunflower seeds       · Limit or do not have unhealthy fats, such as the following:      ?  Foods that contain fat from animals, such as fatty meats, whole milk, butter, and cream    ? Shortening, stick margarine, palm oil, and coconut oil    ? Full-fat or creamy salad dressing    ? Creamy soup    ? Crackers, chips, and baked goods made with margarine or shortening    ? Foods that are fried in unhealthy fats    ? Gravy and sauces, such as Eric or cheese sauces    What you need to know about carbohydrates (carbs): All carbs break down into sugar  Complex carbs contain more fiber than simple carbs  This means complex carbs go into the bloodstream more slowly and cause less of a blood sugar spike  Try to include more complex carbs and fewer simple carbs  · Include complex carbs, such as the following:      ? 1 slice of whole-grain bread    ? 1 ounce of dry cereal that does not contain added sugar    ? ½ cup of cooked oatmeal    ? 2 ounces of cooked whole-grain pasta    ? ½ cup of cooked brown rice    · Limit or do not have simple carbs, such as the following:      ? Baked goods, such as doughnuts, pastries, and cookies    ? Mixes for cornbread and biscuits    ? White rice and pasta mixes, such as boxed macaroni and cheese    ? Instant and cold cereals that contain sugar    ? Jelly, jam, and ice cream that contain sugar    ? Condiments such as ketchup    ? Drinks high in sugar, such as soft drinks, lemonade, and fruit juice    What you need to know about vegetables and fruits:  Vegetables and fruits can be fresh, frozen, or canned  If possible, try to choose low-sodium canned options  · Include a variety of vegetables and fruits, such as the following:      ? 1 medium apple, pear, or peach (about ½ cup chopped)    ? ½ small banana    ? ½ cup berries, such as blueberries, strawberries, or blackberries    ? 1 cup of raw leafy greens, such as lettuce, spinach, kale, or ward greens    ? ½ cup of frozen or canned (no added salt) vegetables, such as green beans    ? ½ cup of fresh, frozen, or canned fruit (canned in light syrup or fruit juice)    ?  ½ cup of vegetable or fruit juice    · Limit or do not have vegetables and fruits made in the following ways:      ? Frozen fruit such as cherries that have added sugar    ? Fruit in cream or butter sauce    ? Canned vegetables that are high in sodium    ? Sauerkraut, pickled vegetables, and other foods prepared in brine    ? Fried vegetables or vegetables in butter or high-fat sauces    What you need to know about protein foods:   · Include lean or low-fat protein foods, such as the following:      ? Poultry (chicken, turkey) with no skin    ? Fish (especially fatty fish, such as salmon, fresh tuna, or mackerel)    ? Lean beef and pork (loin, round, extra lean hamburger)    ? Egg whites and egg substitutes    ? 1 cup of nonfat (skim) or 1% milk    ? 1½ ounces of fat-free or low-fat cheese    ? 6 ounces of nonfat or low-fat yogurt    · Limit or do not have high-fat protein foods, such as the following:      ? Smoked or cured meat, such as corned beef, sharp, ham, hot dogs, and sausage    ? Canned beans and canned meats or spreads, such as potted meats, sardines, anchovies, and imitation seafood    ? Deli or lunch meats, such as bologna, ham, turkey, and roast beef    ? High-fat meat (T-bone steak, regular hamburger, and ribs)    ? Whole eggs and egg yolks    ? Whole milk, 2% milk, and cream    ? Regular cheese and processed cheese    Other guidelines to follow:   · Maintain a healthy weight  Your risk for heart disease is higher if you are overweight  Your healthcare provider may suggest that you lose weight if you are overweight  You can lose weight by eating fewer calories and foods that have added sugars and fat  The DASH meal plan can help you do this  Decrease calories by eating smaller portions at each meal and fewer snacks  Ask your healthcare provider for more information about how to lose weight  · Exercise regularly  Regular exercise can help you reach or maintain a healthy weight   Regular exercise can also help decrease your blood pressure and improve your cholesterol levels  Get 30 minutes or more of moderate exercise each day of the week  To lose weight, get at least 60 minutes of exercise  Talk to your healthcare provider about the best exercise program for you  · Limit alcohol  Women should limit alcohol to 1 drink a day  Men should limit alcohol to 2 drinks a day  A drink of alcohol is 12 ounces of beer, 5 ounces of wine, or 1½ ounces of liquor  For more information:   · [facility] and [address]  [address] [address]  [place] [address]  Phone: 0- 008 - [phone]  Web Address: [facility] no    © 6672 [facility] 2022 Information is for End User's use only and may not be sold, redistributed or otherwise used for commercial purposes  All illustrations and images included in CareNotes® are the copyrighted property of [employer]  or [address] [place], [place] [address]caesar   The above information is an  only  It is not intended as medical advice for individual conditions or treatments  Talk to your doctor, nurse or pharmacist before following any medical regimen to see if it is safe and effective for you

## 2022-03-02 NOTE — ASSESSMENT & PLAN NOTE
Borderline high, after shared decision making patient wishes to follow AHA/ACC guidelines for goal of 120/80 rather than JNC 8 below 140/90  As such I am adding an ACE-inhibitor to CC be in combo med  Also discussed dash diet handout provided

## 2022-03-02 NOTE — PROGRESS NOTES
Assessment/Plan:    Problem List Items Addressed This Visit        Cardiovascular and Mediastinum    Benign hypertension - Primary      Borderline high, after shared decision making patient wishes to follow AHA/ACC guidelines for goal of 120/80 rather than JNC 8 below 140/90  As such I am adding an ACE-inhibitor to CC be in combo med  Also discussed dash diet handout provided  Relevant Medications    amLODIPine-benazepril (LOTREL) 10-20 MG per capsule    Migraines      Uncontrolled, increase Topamax from 25 mg to 50 mg b i d , strongly advised that she quit smoking  Relevant Medications    topiramate (TOPAMAX) 25 mg tablet       Other    Metabolic syndrome     · International Diabetes fair aeration and American Heart Association diagnostic criteria metabolic syndrome: Elevated FBG+LDL+high blood pressure+central obesity  Complicated by smoking  · Per guidelines from Endocrine Society, if pt does not respond to lifestyle modifications from weight management, which this patient has enacted w/o results, metformin is first line medication  Per orders  Relevant Medications    metFORMIN (GLUCOPHAGE) 500 mg tablet      Imaging studies were reviewed and images were reviewed personally  All applicable laboratory studies were reviewed personally  Care everywhere review was performed if  available and all pertinent notes were reviewed  Subjective:     HPI: Melodie Ochoa is a 46 y o  female who  has a past medical history of Anxiety, Arthritis, Bipolar 1 disorder ([address] ), Chronic neck and back pain, Class 1 obesity due to excess calories with serious comorbidity and body mass index (BMI) of 32 0 to 32 9 in adult, Depression, Dizziness, GERD (gastroesophageal reflux disease), Headache, Hypertension, Joint pain, Night sweats, and PTSD (post-traumatic stress disorder)  who presented to the office today for follow up for HTN and weight loss  Her main concern is her inability to lose weight  This is very frustrating to her because she states she went to weight Management has followed their low-calorie diet but this has not helped  She is also concerned that her menopause is contributing to her lack of ability to lose weight  She is interested in pharmacotherapy to help lose weight  Her hypertension is above goal per aha guidelines  Also states her migraines have not improved with Topamax 25    The following portions of the patient's history were reviewed and updated as appropriate: allergies, current medications, past family history, past medical history, past social history, past surgical history, and problem list     Current Outpatient Medications on File Prior to Visit   Medication Sig Dispense Refill    ALPRAZolam (XANAX) 0 5 mg tablet Take 1 tablet (0 5 mg total) by mouth 3 (three) times a day as needed for anxiety 45 tablet 0    ergocalciferol (VITAMIN D2) 50,000 units take 1 capsule by mouth every week 4 capsule 2    gabapentin (NEURONTIN) 600 MG tablet Take 1,200 mg by mouth 2 (two) times a day (Patient not taking: Reported on 12/10/2021 )      HYDROcodone-acetaminophen (Norco) 5-325 mg per tablet Take 1 tablet by mouth daily at bedtime as needed for pain Max Daily Amount: 1 tablet 30 tablet 0    Lurasidone HCl (Latuda) 60 MG TABS Take 1 tablet (60 mg total) by mouth daily 15 tablet 0    meloxicam (Mobic) 15 mg tablet Take 1 tablet (15 mg total) by mouth daily 30 tablet 2    methocarbamol (ROBAXIN) 500 mg tablet Take 1 tablet (500 mg total) by mouth 2 (two) times a day as needed for muscle spasms 60 tablet 1    naloxone (NARCAN) 4 mg/0 1 mL nasal spray Administer 1 spray into a nostril  If no response after 2-3 minutes, give another dose in the other nostril using a new spray   1 each 1    nicotine (NICODERM CQ) 14 mg/24hr TD 24 hr patch apply 1 patch topically once daily (Patient not taking: Reported on 12/10/2021)      nicotine (NICODERM CQ) 21 mg/24 hr TD 24 hr patch Place 1 patch on the skin every 24 hours 28 patch 2    nicotine polacrilex (NICORETTE) 4 mg gum       omeprazole (PriLOSEC) 20 mg delayed release capsule Take 1 capsule (20 mg total) by mouth daily Omeprazole 20 MG Oral Capsule Delayed Release take 1 capsule by mouth once daily  Quantity: 30;  Refills: 1    Ewa Stevens PAC;  Started 2-Oct-2015 Active 90 capsule 1    traZODone (DESYREL) 100 mg tablet Take 1 tablet (100 mg total) by mouth daily at bedtime 15 tablet 0    varenicline (CHANTIX) 1 mg tablet Take 1 mg by mouth 2 (two) times a day       No current facility-administered medications on file prior to visit  Review of Systems   Constitutional: Positive for diaphoresis (hot flashes) and fatigue  HENT: Negative  Eyes: Negative  Respiratory: Negative  Cardiovascular: Negative  Gastrointestinal: Negative  Endocrine: Negative  Genitourinary: Negative  Musculoskeletal: Negative  Skin: Negative  Allergic/Immunologic: Negative  Neurological: Negative  Psychiatric/Behavioral: Negative  Objective:    /80 (BP Location: Right arm, Patient Position: Sitting, Cuff Size: Large)   Pulse 73   Temp 97 8 °F (36 6 °C) (Temporal)   Resp 18   Ht 5' 1" (1 549 m)   Wt 82 1 kg (181 lb)   LMP 05/10/2016   SpO2 98%   Breastfeeding No   BMI 34 20 kg/m²     Physical Exam  Vitals reviewed  Constitutional:       General: She is not in acute distress  Appearance: Normal appearance  HENT:      Head: Normocephalic  Right Ear: External ear normal       Left Ear: External ear normal       Nose: Nose normal  No congestion  Mouth/Throat:      Mouth: Mucous membranes are moist    Eyes:      Extraocular Movements: Extraocular movements intact  Conjunctiva/sclera: Conjunctivae normal    Cardiovascular:      Rate and Rhythm: Normal rate and regular rhythm  Heart sounds: Normal heart sounds  No murmur heard  No friction rub  No gallop      Pulmonary:      Effort: Pulmonary effort is normal       Breath sounds: Normal breath sounds  No wheezing  Musculoskeletal:         General: Normal range of motion  Cervical back: Normal range of motion and neck supple  No muscular tenderness  Right lower leg: No edema  Left lower leg: No edema  Skin:     General: Skin is warm and dry  Capillary Refill: Capillary refill takes less than 2 seconds  Neurological:      General: No focal deficit present  Mental Status: She is alert  Psychiatric:         Mood and Affect: Mood normal          Behavior: Behavior normal          Thought Content: Thought content normal          GRIS Villagran  03/02/22  3:31 PM    Patient Instructions     DASH Eating Plan   WHAT YOU NEED TO KNOW:   The DASH (Dietary Approaches to Stop Hypertension) Eating Plan is designed to help prevent or lower high blood pressure  It can also help to lower LDL (bad) cholesterol and decrease your risk for heart disease  The plan is low in sodium, sugar, unhealthy fats, and total fat  It is high in potassium, calcium, magnesium, and fiber  These nutrients are added when you eat more fruits, vegetables, and whole grains  With the DASH eating plan, you need to eat a certain number of servings from each food group  This will help you get enough of certain nutrients and limit others  The amount of servings you should eat depends on how many calories you need  Your dietitian can       DISCHARGE INSTRUCTIONS:   What you need to know about sodium:  Your dietitian will tell you how much sodium is safe for you to have each day  People with high blood pressure should have no more than 1,500 to 2,300 mg of sodium in a day  A teaspoon (tsp) of salt has 2,300 mg of sodium  This may seem like a difficult goal, but small changes to the foods you eat can make a big difference  Your healthcare provider or dietitian can help you create a meal plan that follows your sodium limit  · Read food labels    Food labels can help you choose foods that are low in sodium  The amount of sodium is listed in milligrams (mg)  The % Daily Value (DV) column tells you how much of your daily needs are met by 1 serving of the food for each nutrient listed  Choose foods that have less than 5% of the DV of sodium  These foods are considered low in sodium  Foods that have 20% or more of the DV of sodium are considered high in sodium  Avoid foods that have more than 300 mg of sodium in each serving  Choose foods that say low-sodium, reduced-sodium, or no salt added on the food label  · Limit added salt  Do not salt food at the table if you add salt when you cook  Use herbs and spices, such as onions, garlic, and salt-free seasonings to add flavor  Try lemon or lime juice or vinegar to add a tart flavor  Use hot peppers or a small amount of hot pepper sauce to add a spicy flavor  Limit foods high in added salt, such as the following:    ? Seasonings made with salt, such as garlic salt, celery salt, onion salt, seasoned salt, meat tenderizers, and monosodium glutamate (MSG)    ? Miso soup and canned or dried soup mixes    ? Regular soy sauce, barbecue sauce, teriyaki sauce, steak sauce, Worcestershire sauce, and most flavored vinegars    ? Snack foods, such as salted chips, popcorn, pretzels, pork rinds, salted crackers, and salted nuts    ? Frozen foods, such as dinners, entrees, vegetables with sauces, and breaded meats    · Ask about salt substitutes  Ask your healthcare provider if you may use salt substitutes  Some salt substitutes have ingredients that can be harmful if you have certain health conditions  · Choose foods carefully at restaurants  Meals from restaurants, especially fast food restaurants, are often high in sodium  Some restaurants have nutrition information that tells you the amount of sodium in their foods  Ask to have your food prepared with less, or no salt      What you need to know about fats:  Healthy fats include unsaturated fats and omega-3 fatty acids  Unhealthy fats include saturated fats and trans fats  · Include healthy fats, such as the following:      ? Cooking oils, such as soybean, canola, olive, or sunflower    ? Fatty fish, such as salmon, tuna, mackerel, or sardines    ? Flaxseed oil or ground flaxseed    ? ½ cup of cooked beans, such as black beans, kidney beans, or aaron beans    ? 1½ ounces of low-sodium nuts, such as almonds or walnuts    ? Low-sugar, low-sodium peanut butter    ? Seeds such as dariel seeds or sunflower seeds       · Limit or do not have unhealthy fats, such as the following:      ? Foods that contain fat from animals, such as fatty meats, whole milk, butter, and cream    ? Shortening, stick margarine, palm oil, and coconut oil    ? Full-fat or creamy salad dressing    ? Creamy soup    ? Crackers, chips, and baked goods made with margarine or shortening    ? Foods that are fried in unhealthy fats    ? Gravy and sauces, such as Eric or cheese sauces    What you need to know about carbohydrates (carbs): All carbs break down into sugar  Complex carbs contain more fiber than simple carbs  This means complex carbs go into the bloodstream more slowly and cause less of a blood sugar spike  Try to include more complex carbs and fewer simple carbs  · Include complex carbs, such as the following:      ? 1 slice of whole-grain bread    ? 1 ounce of dry cereal that does not contain added sugar    ? ½ cup of cooked oatmeal    ? 2 ounces of cooked whole-grain pasta    ? ½ cup of cooked brown rice    · Limit or do not have simple carbs, such as the following:      ? Baked goods, such as doughnuts, pastries, and cookies    ? Mixes for cornbread and biscuits    ? White rice and pasta mixes, such as boxed macaroni and cheese    ? Instant and cold cereals that contain sugar    ? Jelly, jam, and ice cream that contain sugar    ? Condiments such as ketchup    ?  Drinks high in sugar, such as soft drinks, lemonade, and fruit juice    What you need to know about vegetables and fruits:  Vegetables and fruits can be fresh, frozen, or canned  If possible, try to choose low-sodium canned options  · Include a variety of vegetables and fruits, such as the following:      ? 1 medium apple, pear, or peach (about ½ cup chopped)    ? ½ small banana    ? ½ cup berries, such as blueberries, strawberries, or blackberries    ? 1 cup of raw leafy greens, such as lettuce, spinach, kale, or ward greens    ? ½ cup of frozen or canned (no added salt) vegetables, such as green beans    ? ½ cup of fresh, frozen, or canned fruit (canned in light syrup or fruit juice)    ? ½ cup of vegetable or fruit juice    · Limit or do not have vegetables and fruits made in the following ways:      ? Frozen fruit such as cherries that have added sugar    ? Fruit in cream or butter sauce    ? Canned vegetables that are high in sodium    ? Sauerkraut, pickled vegetables, and other foods prepared in brine    ? Fried vegetables or vegetables in butter or high-fat sauces    What you need to know about protein foods:   · Include lean or low-fat protein foods, such as the following:      ? Poultry (chicken, turkey) with no skin    ? Fish (especially fatty fish, such as salmon, fresh tuna, or mackerel)    ? Lean beef and pork (loin, round, extra lean hamburger)    ? Egg whites and egg substitutes    ? 1 cup of nonfat (skim) or 1% milk    ? 1½ ounces of fat-free or low-fat cheese    ? 6 ounces of nonfat or low-fat yogurt    · Limit or do not have high-fat protein foods, such as the following:      ? Smoked or cured meat, such as corned beef, sharp, ham, hot dogs, and sausage    ? Canned beans and canned meats or spreads, such as potted meats, sardines, anchovies, and imitation seafood    ? Deli or lunch meats, such as bologna, ham, turkey, and roast beef    ? High-fat meat (T-bone steak, regular hamburger, and ribs)    ?  Whole eggs and egg yolks    ? Whole milk, 2% milk, and cream    ? Regular cheese and processed cheese    Other guidelines to follow:   · Maintain a healthy weight  Your risk for heart disease is higher if you are overweight  Your healthcare provider may suggest that you lose weight if you are overweight  You can lose weight by eating fewer calories and foods that have added sugars and fat  The DASH meal plan can help you do this  Decrease calories by eating smaller portions at each meal and fewer snacks  Ask your healthcare provider for more information about how to lose weight  · Exercise regularly  Regular exercise can help you reach or maintain a healthy weight  Regular exercise can also help decrease your blood pressure and improve your cholesterol levels  Get 30 minutes or more of moderate exercise each day of the week  To lose weight, get at least 60 minutes of exercise  Talk to your healthcare provider about the best exercise program for you  · Limit alcohol  Women should limit alcohol to 1 drink a day  Men should limit alcohol to 2 drinks a day  A drink of alcohol is 12 ounces of beer, 5 ounces of wine, or 1½ ounces of liquor  For more information:   · [employer] [facility] and [address]  [address] [address]  [place] [address]  Phone: [phone]  Web Address: [facility] no    © [facility] 2022 Information is for End User's use only and may not be sold, redistributed or otherwise used for commercial purposes  All illustrations and images included in CareNotes® are the copyrighted property of A D A M , Inc  or [facility]ser   The above information is an  only  It is not intended as medical advice for individual conditions or treatments  Talk to your doctor, nurse or pharmacist before following any medical regimen to see if it is safe and effective for you

## 2022-03-07 ENCOUNTER — TELEPHONE (OUTPATIENT)
Dept: FAMILY MEDICINE CLINIC | Facility: CLINIC | Age: 52
End: 2022-03-07

## 2022-03-07 NOTE — TELEPHONE ENCOUNTER
covermymeds prior authorization form received by fax on 03/07/22  to be completed by PCP  Copy made and placed in PCP folder  Forms to be delivered to PCP mailbox at assigned time

## 2022-03-10 DIAGNOSIS — I10 BENIGN HYPERTENSION: Primary | ICD-10-CM

## 2022-03-10 RX ORDER — BENAZEPRIL HYDROCHLORIDE 20 MG/1
20 TABLET ORAL DAILY
Qty: 30 TABLET | Refills: 5 | Status: SHIPPED | OUTPATIENT
Start: 2022-03-10

## 2022-03-10 RX ORDER — AMLODIPINE BESYLATE 10 MG/1
10 TABLET ORAL DAILY
Qty: 30 TABLET | Refills: 5 | Status: SHIPPED | OUTPATIENT
Start: 2022-03-10

## 2022-03-10 NOTE — TELEPHONE ENCOUNTER
Please call patient , let her know the combo BP pill was not covered by insurance, i'm prescribing the two medications separately instead

## 2022-05-02 ENCOUNTER — TELEPHONE (OUTPATIENT)
Dept: FAMILY MEDICINE CLINIC | Facility: CLINIC | Age: 52
End: 2022-05-02

## 2022-05-02 NOTE — TELEPHONE ENCOUNTER
first attempt to contact patient   left message to return my call on answering machine  Reschedule appt of 05/05/2022

## 2022-05-17 ENCOUNTER — TELEPHONE (OUTPATIENT)
Dept: FAMILY MEDICINE CLINIC | Facility: CLINIC | Age: 52
End: 2022-05-17

## 2022-05-19 ENCOUNTER — OFFICE VISIT (OUTPATIENT)
Dept: AUDIOLOGY | Facility: CLINIC | Age: 52
End: 2022-05-19
Payer: COMMERCIAL

## 2022-05-19 ENCOUNTER — OFFICE VISIT (OUTPATIENT)
Dept: OTOLARYNGOLOGY | Facility: CLINIC | Age: 52
End: 2022-05-19
Payer: COMMERCIAL

## 2022-05-19 VITALS
HEIGHT: 61 IN | BODY MASS INDEX: 33.23 KG/M2 | HEART RATE: 70 BPM | WEIGHT: 176 LBS | TEMPERATURE: 97.3 F | OXYGEN SATURATION: 98 %

## 2022-05-19 DIAGNOSIS — H93.13 TINNITUS OF BOTH EARS: Primary | ICD-10-CM

## 2022-05-19 DIAGNOSIS — Z87.81 HISTORY OF FACIAL FRACTURE REPAIR: Primary | ICD-10-CM

## 2022-05-19 DIAGNOSIS — J34.2 DEVIATED NASAL SEPTUM: ICD-10-CM

## 2022-05-19 DIAGNOSIS — H92.03 OTALGIA OF BOTH EARS: ICD-10-CM

## 2022-05-19 DIAGNOSIS — Z98.890 HISTORY OF FACIAL FRACTURE REPAIR: Primary | ICD-10-CM

## 2022-05-19 DIAGNOSIS — M95.0 ACQUIRED NASAL DEFORMITY: ICD-10-CM

## 2022-05-19 DIAGNOSIS — Z01.10 NORMAL HEARING TEST: ICD-10-CM

## 2022-05-19 DIAGNOSIS — J32.4 CHRONIC PANSINUSITIS: ICD-10-CM

## 2022-05-19 PROCEDURE — 99204 OFFICE O/P NEW MOD 45 MIN: CPT | Performed by: OTOLARYNGOLOGY

## 2022-05-19 PROCEDURE — 31231 NASAL ENDOSCOPY DX: CPT | Performed by: OTOLARYNGOLOGY

## 2022-05-19 PROCEDURE — 92567 TYMPANOMETRY: CPT | Performed by: AUDIOLOGIST

## 2022-05-19 PROCEDURE — 92557 COMPREHENSIVE HEARING TEST: CPT | Performed by: AUDIOLOGIST

## 2022-05-19 RX ORDER — TRAZODONE HYDROCHLORIDE 150 MG/1
TABLET ORAL
COMMUNITY
Start: 2022-05-18
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
## 2022-05-19 NOTE — PROGRESS NOTES
AUDIOLOGY AUDIOMETRIC EVALUATION      Name:  Karolyn Shine  :  1970  Age:  46 y o  Date of Evaluation: 2022    History:  Reason for visit:  Ms Opal Ramirez is seen today at the request of Dr Asuncion Don for an evaluation of hearing  Patient complains of difficulty understanding, otalgia, and tinnitus  EVALUATION    Audiogram Results  Right Left   X Normal X Normal    Conductive  Conductive    Sensorineural  Sensorineural    Mixed  Mixed     Impedence Audiometry  Tympanometry    Type Vol Press Comp   R A 1 8 ml -30 daPa 1 0 ml   L A 1 7 ml -15 daPa 0 6 ml     RESULTS:    Pure Tone Audiometry:  Normal hearing thresholds AU    Speech Audiometry:        Right/Left Ear:  SRT: 10dB                 Word Recognition scores were excellent (100%)     at 45dB presentation level    Test-Retest Reliability: Good  PT Stimulus: Puretone  Speech Stimulus: Recorded  Recognition Test: NU-6 (2,3)  Response: Push Button  Transducer: Headphones    PATIENT EDUCATION:   Discussed audio results with Ms Opal Ramirez  Patient to follow-up with Dr Tal Fink for further testing and recommendations  Ned Aleman    Licensed Audiologist

## 2022-06-09 ENCOUNTER — APPOINTMENT (EMERGENCY)
Dept: RADIOLOGY | Facility: HOSPITAL | Age: 52
End: 2022-06-09
Payer: COMMERCIAL

## 2022-06-09 ENCOUNTER — HOSPITAL ENCOUNTER (EMERGENCY)
Facility: HOSPITAL | Age: 52
Discharge: HOME | End: 2022-06-09
Attending: EMERGENCY MEDICINE
Payer: COMMERCIAL

## 2022-06-09 VITALS
HEART RATE: 68 BPM | DIASTOLIC BLOOD PRESSURE: 72 MMHG | SYSTOLIC BLOOD PRESSURE: 121 MMHG | RESPIRATION RATE: 18 BRPM | OXYGEN SATURATION: 100 %

## 2022-06-09 DIAGNOSIS — S80.10XA CONTUSION OF MULTIPLE SITES OF LOWER EXTREMITY, UNSPECIFIED LATERALITY, INITIAL ENCOUNTER: Primary | ICD-10-CM

## 2022-06-09 PROCEDURE — 99282 EMERGENCY DEPT VISIT SF MDM: CPT | Performed by: EMERGENCY MEDICINE

## 2022-06-09 PROCEDURE — 99283 EMERGENCY DEPT VISIT LOW MDM: CPT

## 2022-06-09 PROCEDURE — 73590 X-RAY EXAM OF LOWER LEG: CPT

## 2022-06-09 PROCEDURE — 73630 X-RAY EXAM OF FOOT: CPT

## 2022-06-09 NOTE — ED PROVIDER NOTES
History  Chief Complaint   Patient presents with    Leg Pain     Pt reports falling over walked and now has bilateral leg pain, hard to lift them  46 yo F, presents with fall and injury to bilateral lower legs and left foot  Patient states she uses a walker at baseline, tripped over a walker and fell earlier today  Patient reported moderate, dull pain to bilateral shins and top of left foot  Pain constant, worse with movement  Denies any head injury or loss of consciousness  History provided by:  Patient   used: No    Leg Pain  Associated symptoms: no fever        Prior to Admission Medications   Prescriptions Last Dose Informant Patient Reported? Taking?    ALPRAZolam (XANAX) 0 5 mg tablet  Self No Yes   Sig: Take 1 tablet (0 5 mg total) by mouth 3 (three) times a day as needed for anxiety   HYDROcodone-acetaminophen (Norco) 5-325 mg per tablet   No Yes   Sig: Take 1 tablet by mouth daily at bedtime as needed for pain Max Daily Amount: 1 tablet   Lurasidone HCl (Latuda) 60 MG TABS  Self No Yes   Sig: Take 1 tablet (60 mg total) by mouth daily   amLODIPine (NORVASC) 10 mg tablet   No Yes   Sig: Take 1 tablet (10 mg total) by mouth daily   benazepril (LOTENSIN) 20 mg tablet   No Yes   Sig: Take 1 tablet (20 mg total) by mouth daily   ergocalciferol (VITAMIN D2) 50,000 units   No Yes   Sig: take 1 capsule by mouth every week   gabapentin (NEURONTIN) 600 MG tablet   Yes No   Sig: Take 1,200 mg by mouth 2 (two) times a day   Patient not taking: Reported on 12/10/2021    meloxicam (Mobic) 15 mg tablet   No Yes   Sig: Take 1 tablet (15 mg total) by mouth daily   metFORMIN (GLUCOPHAGE) 500 mg tablet   No Yes   Sig: Take 1 tablet (500 mg total) by mouth 2 (two) times a day with meals   methocarbamol (ROBAXIN) 500 mg tablet   No Yes   Sig: Take 1 tablet (500 mg total) by mouth 2 (two) times a day as needed for muscle spasms   naloxone (NARCAN) 4 mg/0 1 mL nasal spray   No Yes   Sig: Administer 1 spray into a nostril  If no response after 2-3 minutes, give another dose in the other nostril using a new spray     nicotine (NICODERM CQ) 14 mg/24hr TD 24 hr patch   Yes No   Sig: apply 1 patch topically once daily   Patient not taking: Reported on 12/10/2021   nicotine (NICODERM CQ) 21 mg/24 hr TD 24 hr patch  Self No No   Sig: Place 1 patch on the skin every 24 hours   nicotine polacrilex (NICORETTE) 4 mg gum  Self Yes Yes   omeprazole (PriLOSEC) 20 mg delayed release capsule   No Yes   Sig: Take 1 capsule (20 mg total) by mouth daily Omeprazole 20 MG Oral Capsule Delayed Release take 1 capsule by mouth once daily  Quantity: 30;  Refills: Ewa Swanson PAC;  Started 2-Oct-2015 Active   topiramate (TOPAMAX) 25 mg tablet   No Yes   Sig: Take 2 tablets (50 mg total) by mouth 2 (two) times a day   traZODone (DESYREL) 100 mg tablet  Self No Yes   Sig: Take 1 tablet (100 mg total) by mouth daily at bedtime   traZODone (DESYREL) 150 mg tablet   Yes Yes   Sig: take 1 tablet by mouth once daily every evening   varenicline (CHANTIX) 1 mg tablet   Yes Yes   Sig: Take 1 mg by mouth 2 (two) times a day      Facility-Administered Medications: None       Past Medical History:   Diagnosis Date    Anxiety     Arthritis     Bipolar 1 disorder (HCC)     Chronic neck and back pain     Class 1 obesity due to excess calories with serious comorbidity and body mass index (BMI) of 32 0 to 32 9 in adult 3/13/2020    Depression     Dizziness     GERD (gastroesophageal reflux disease)     Headache     Hypertension     Joint pain     and swelling    Night sweats     PTSD (post-traumatic stress disorder)        Past Surgical History:   Procedure Laterality Date    FACIAL FRACTURE SURGERY      FRACTURE SURGERY      ORTHOPEDIC SURGERY      TENDON REPAIR         Family History   Problem Relation Age of Onset    Diabetes Mother     Hypertension Mother     Thyroid disease Mother     No Known Problems Father  No Known Problems Sister     No Known Problems Daughter     Lung cancer Maternal Grandmother     No Known Problems Maternal Grandfather     No Known Problems Paternal Grandmother     No Known Problems Paternal Grandfather     No Known Problems Brother     Heart disease Neg Hx     Stroke Neg Hx      I have reviewed and agree with the history as documented  E-Cigarette/Vaping    E-Cigarette Use Never User      E-Cigarette/Vaping Substances    Nicotine No     THC No     CBD No     Flavoring No     Other No     Unknown No      Social History     Tobacco Use    Smoking status: Light Tobacco Smoker     Packs/day: 0 25    Smokeless tobacco: Never Used   Vaping Use    Vaping Use: Never used   Substance Use Topics    Alcohol use: Yes     Comment: social    Drug use: Yes     Types: Marijuana       Review of Systems   Constitutional: Negative  Negative for fever  Skin: Negative  Neurological: Negative  Hematological: Negative  Physical Exam  Physical Exam  Vitals and nursing note reviewed  Constitutional:       General: She is not in acute distress  HENT:      Head: Normocephalic and atraumatic  Cardiovascular:      Rate and Rhythm: Normal rate  Pulmonary:      Effort: Pulmonary effort is normal    Musculoskeletal:         General: Normal range of motion  Comments: Right shin with small contusion and swelling to anterior right shin with mild tenderness  No right ankle or foot tenderness  Left shin with small contusion and swelling, mild tenderness  Tenderness to top of left foot with mild swelling  No ankle or Achilles tendon tenderness  Neurological:      General: No focal deficit present  Mental Status: She is alert and oriented to person, place, and time           Vital Signs  ED Triage Vitals [06/09/22 1401]   Temp Pulse Respirations Blood Pressure SpO2   -- 68 18 121/72 100 %      Temp src Heart Rate Source Patient Position - Orthostatic VS BP Location FiO2 (%)   -- Monitor Sitting Right arm --      Pain Score       9           Vitals:    06/09/22 1401   BP: 121/72   Pulse: 68   Patient Position - Orthostatic VS: Sitting         Visual Acuity      ED Medications  Medications - No data to display    Diagnostic Studies  Results Reviewed     None                 XR tibia fibula 2 views LEFT   Final Result by Neetu Wooten MD (06/09 1649)      No acute osseous abnormality  Workstation performed: [id number]         XR tibia fibula 2 views RIGHT   Final Result by Neetu Wooten MD (06/09 1650)      No acute osseous abnormality  Workstation performed: [id number]         XR foot 3+ views LEFT    (Results Pending)              Procedures  Procedures         ED Course  ED Course as of 06/09/22 1652   Thu Jun 09, 2022   1642 X-rays of left and right tibia, fibula and left foot reviewed  No acute fracture noted  MDM  Number of Diagnoses or Management Options  Contusion of multiple sites of lower extremity, unspecified laterality, initial encounter  Diagnosis management comments: 56-year-old female, presenting with injuries to bilateral lower extremities  Differential diagnosis includes fracture, sprain, contusion among other diagnosis  X-rays ordered  X-rays with no signs of acute fracture  Discussed with patient applying ice to areas of contusion, over-the-counter medication as needed  Instructed to follow-up for any worsening or new concerning symptoms         Amount and/or Complexity of Data Reviewed  Tests in the radiology section of CPT®: ordered and reviewed  Independent visualization of images, tracings, or specimens: yes        Disposition  Final diagnoses:   Contusion of multiple sites of lower extremity, unspecified laterality, initial encounter     Time reflects when diagnosis was documented in both MDM as applicable and the Disposition within this note     Time User Action Codes Description Comment    6/9/2022  4:45 PM Gemma Brewster Add [S80 [id number]] Contusion of multiple sites of lower extremity, unspecified laterality, initial encounter       ED Disposition     ED Disposition   Discharge    Condition   Stable    Date/Time   Thu Jun 9, 2022  4:45 PM    Comment   Aylin Levi discharge to home/self care  Follow-up Information     Follow up With Specialties Details Why [address], [facility]  Nurse Practitioner   [address] [address], [place]  [address] [place] [address]  [place] [address] [facility]  [phone]            Discharge Medication List as of 6/9/2022  4:45 PM      CONTINUE these medications which have NOT CHANGED    Details   ALPRAZolam (XANAX) 0 5 mg tablet Take 1 tablet (0 5 mg total) by mouth 3 (three) times a day as needed for anxiety, Starting Mon 4/12/2021, Normal      amLODIPine (NORVASC) 10 mg tablet Take 1 tablet (10 mg total) by mouth daily, Starting Thu 3/10/2022, Normal      benazepril (LOTENSIN) 20 mg tablet Take 1 tablet (20 mg total) by mouth daily, Starting Thu 3/10/2022, Normal      ergocalciferol (VITAMIN D2) 50,000 units take 1 capsule by mouth every week, Normal      HYDROcodone-acetaminophen (Norco) 5-325 mg per tablet Take 1 tablet by mouth daily at bedtime as needed for pain Max Daily Amount: 1 tablet, Starting Mon 11/22/2021, Normal      Lurasidone HCl (Latuda) 60 MG TABS Take 1 tablet (60 mg total) by mouth daily, Starting Wed 4/7/2021, Normal      meloxicam (Mobic) 15 mg tablet Take 1 tablet (15 mg total) by mouth daily, Starting Mon 11/15/2021, Normal      metFORMIN (GLUCOPHAGE) 500 mg tablet Take 1 tablet (500 mg total) by mouth 2 (two) times a day with meals, Starting Tue 3/1/2022, Normal      methocarbamol (ROBAXIN) 500 mg tablet Take 1 tablet (500 mg total) by mouth 2 (two) times a day as needed for muscle spasms, Starting Fri 12/10/2021, Normal      naloxone (NARCAN) 4 mg/0 1 mL nasal spray Administer 1 spray into a nostril   If no response after 2-3 minutes, give another dose in the other nostril using a new spray , Normal      nicotine polacrilex (NICORETTE) 4 mg gum Starting Tue 1/28/2020, Historical Med      omeprazole (PriLOSEC) 20 mg delayed release capsule Take 1 capsule (20 mg total) by mouth daily Omeprazole 20 MG Oral Capsule Delayed Release take 1 capsule by mouth once daily  Quantity: 30;  Refills: 1    Ewa Stevens PAC;  Started 2-Oct-2015 Active, Starting Mon 11/15/2021, Normal      topiramate (TOPAMAX) 25 mg tablet Take 2 tablets (50 mg total) by mouth 2 (two) times a day, Starting Tue 3/1/2022, Normal      !! traZODone (DESYREL) 100 mg tablet Take 1 tablet (100 mg total) by mouth daily at bedtime, Starting Tue 4/13/2021, Normal      !! traZODone (DESYREL) 150 mg tablet take 1 tablet by mouth once daily every evening, Historical Med      varenicline (CHANTIX) 1 mg tablet Take 1 mg by mouth 2 (two) times a day, Starting Thu 11/4/2021, Historical Med      gabapentin (NEURONTIN) 600 MG tablet Take 1,200 mg by mouth 2 (two) times a day, Starting Wed 12/1/2021, Historical Med      nicotine (NICODERM CQ) 14 mg/24hr TD 24 hr patch apply 1 patch topically once daily, Historical Med      nicotine (NICODERM CQ) 21 mg/24 hr TD 24 hr patch Place 1 patch on the skin every 24 hours, Starting Thu 3/12/2020, Normal       !! - Potential duplicate medications found  Please discuss with provider  No discharge procedures on file      PDMP Review       Value Time User    PDMP Reviewed  Yes 11/22/2021 12:21 PM Shalom Esparza MD          ED Provider  Electronically Signed by           Jackson Rivero MD  06/09/22 9215

## 2022-07-13 ENCOUNTER — OFFICE VISIT (OUTPATIENT)
Dept: FAMILY MEDICINE CLINIC | Facility: CLINIC | Age: 52
End: 2022-07-13

## 2022-07-13 VITALS
SYSTOLIC BLOOD PRESSURE: 116 MMHG | BODY MASS INDEX: 31.47 KG/M2 | TEMPERATURE: 98.2 F | HEIGHT: 61 IN | RESPIRATION RATE: 18 BRPM | WEIGHT: 166.7 LBS | OXYGEN SATURATION: 98 % | DIASTOLIC BLOOD PRESSURE: 74 MMHG | HEART RATE: 73 BPM

## 2022-07-13 DIAGNOSIS — I10 BENIGN HYPERTENSION: Primary | ICD-10-CM

## 2022-07-13 DIAGNOSIS — R79.89 LOW SERUM VITAMIN D: ICD-10-CM

## 2022-07-13 DIAGNOSIS — R06.83 SNORING: ICD-10-CM

## 2022-07-13 DIAGNOSIS — M79.18 MYOFASCIAL PAIN SYNDROME: ICD-10-CM

## 2022-07-13 DIAGNOSIS — M25.561 CHRONIC PAIN OF BOTH KNEES: ICD-10-CM

## 2022-07-13 DIAGNOSIS — R29.818 SUSPECTED SLEEP APNEA: ICD-10-CM

## 2022-07-13 DIAGNOSIS — M17.0 PRIMARY OSTEOARTHRITIS OF BOTH KNEES: ICD-10-CM

## 2022-07-13 DIAGNOSIS — G47.00 INSOMNIA, UNSPECIFIED TYPE: ICD-10-CM

## 2022-07-13 DIAGNOSIS — M51.36 LUMBAR DEGENERATIVE DISC DISEASE: ICD-10-CM

## 2022-07-13 DIAGNOSIS — K21.9 GASTROESOPHAGEAL REFLUX DISEASE WITHOUT ESOPHAGITIS: ICD-10-CM

## 2022-07-13 DIAGNOSIS — M46.1 SACROILIITIS (HCC): ICD-10-CM

## 2022-07-13 DIAGNOSIS — R20.2 PARESTHESIA: ICD-10-CM

## 2022-07-13 DIAGNOSIS — G57.01 PIRIFORMIS SYNDROME OF RIGHT SIDE: ICD-10-CM

## 2022-07-13 DIAGNOSIS — Z72.0 TOBACCO USE: ICD-10-CM

## 2022-07-13 DIAGNOSIS — M62.838 MUSCLE SPASM: ICD-10-CM

## 2022-07-13 DIAGNOSIS — G89.29 CHRONIC PAIN OF BOTH KNEES: ICD-10-CM

## 2022-07-13 DIAGNOSIS — G43.709 CHRONIC MIGRAINE WITHOUT AURA WITHOUT STATUS MIGRAINOSUS, NOT INTRACTABLE: ICD-10-CM
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
## 2022-07-13 DIAGNOSIS — M25.562 CHRONIC PAIN OF BOTH KNEES: ICD-10-CM

## 2022-07-13 PROCEDURE — 3078F DIAST BP <80 MM HG: CPT

## 2022-07-13 PROCEDURE — 99214 OFFICE O/P EST MOD 30 MIN: CPT

## 2022-07-13 PROCEDURE — 3074F SYST BP LT 130 MM HG: CPT

## 2022-07-13 RX ORDER — OMEPRAZOLE 40 MG/1
40 CAPSULE, DELAYED RELEASE ORAL DAILY
Qty: 90 CAPSULE | Refills: 1 | Status: SHIPPED | OUTPATIENT
Start: 2022-07-13

## 2022-07-13 NOTE — PROGRESS NOTES
[address] [address] PRACTICE ANIVAL    NAME: Jason Desir  AGE: 45 y o  SEX: female  : 1970     DATE: 7/15/2022     Assessment and Plan:     Problem List Items Addressed This Visit        Digestive    Gastroesophageal reflux disease without esophagitis     Symptoms uncontrolled and may be contributing to waking up choking and coughing  Pt will eat and then lie down to watch TV or sleep  - Reviewed the lifestyle changes necessary to promote better control of symptoms, avoid trigger foods, eat small meals, stop smoking, and do not lie down for 2 hours after eating    - Refilled Prilosec  Relevant Medications    omeprazole (PriLOSEC) 40 MG capsule       Cardiovascular and Mediastinum    Benign hypertension - Primary     BP well-controlled, at goal in office today: 116/74    - Continue amlodipine 10 mg daily and benazepril 20 mg daily  Migraines    Relevant Medications    topiramate (TOPAMAX) 25 mg tablet    methocarbamol (ROBAXIN) 500 mg tablet       Nervous and Auditory    Piriformis syndrome of right side    Relevant Medications    methocarbamol (ROBAXIN) 500 mg tablet    Other Relevant Orders    Durable Medical Equipment       Musculoskeletal and Integument    Lumbar degenerative disc disease    Relevant Medications    methocarbamol (ROBAXIN) 500 mg tablet    Other Relevant Orders    Durable Medical Equipment    Primary osteoarthritis of both knees     Pt reporting left knee giving out while walking, recently fell even while using cane, pt requesting walker    - Order placed for rolling walker    - Continue to follow with orthopedics   - Smoking cessation recommended prior to knee replacement            Sacroiliitis (HCC)    Relevant Medications    methocarbamol (ROBAXIN) 500 mg tablet    Other Relevant Orders    Durable Medical Equipment       Other    Low serum vitamin D     - Vitamin D 25 hydroxy         Relevant Orders    Vitamin D 25 hydroxy    Tobacco use     Pt has cut down to 1-2 cigarettes per day  - Tobacco cessation counseling provided  Reviewed health effects of continued smoking and effect on orthopedic surgery in particular  Chronic pain of both knees    Relevant Orders    Durable Medical Equipment    Suspected sleep apnea     Pt concerned she may have sleep apnea, waking coughing and choking for the past several months  May be multifactorial as pt also follows with ENT for chronic congestion and frequently eats close to bedtime with known GERD  - Reviewed lifestyle changes that may help relieve symptoms    - Referral to Sleep Medicine  Relevant Orders    Ambulatory Referral to Sleep Medicine      Other Visit Diagnoses     Insomnia, unspecified type        Relevant Orders    Ambulatory Referral to Sleep Medicine    Snoring        Relevant Orders    Ambulatory Referral to Sleep Medicine    Fall, subsequent encounter        Relevant Orders    Durable Medical Equipment    Paresthesia        Relevant Orders    Vitamin B12/Folate, Serum Panel    Muscle spasm        Relevant Medications    methocarbamol (ROBAXIN) 500 mg tablet    Other Relevant Orders    Durable Medical Equipment    Myofascial pain syndrome        Relevant Medications    methocarbamol (ROBAXIN) 500 mg tablet    Other Relevant Orders    Durable Medical Equipment          Return in about 3 months (around 10/13/2022) for Annual physical      Chief Complaint:     Chief Complaint   Patient presents with    Hypertension      History of Present Illness:     Klaus Valdez presented to the office today for routine follow up for chronic conditions  Pt is concerned that she may have sleep apnea and requested a sleep study  Pt states that for the past several months she wakes up coughing and choking, states she feels like she can't breathe, wakes up unrefreshed  She thinks she does snore  Pt states she is afraid to go to sleep for fear she won't wake up   Pt does also have chronic sinus congestion and GERD  Denies hx of asthma or allergies  Pt has an appt with ortho this Friday, is pursing knee replacement but was told she had to quit smoking first, is currently down to 1-2 cigarettes per day  Pt requested prescription for walker due to instability and weakness in left leg, states she fell recently when her knee gave out while walking with cane  Depression Screening  PHQ-2/9 Depression Screening    Little interest or pleasure in doing things: 0 - not at all  Feeling down, depressed, or hopeless: 0 - not at all  Trouble falling or staying asleep, or sleeping too much: 0 - not at all  Feeling tired or having little energy: 0 - not at all  Poor appetite or overeatin - not at all  Feeling bad about yourself - or that you are a failure or have let yourself or your family down: 0 - not at all  Trouble concentrating on things, such as reading the newspaper or watching television: 0 - not at all  Moving or speaking so slowly that other people could have noticed  Or the opposite - being so fidgety or restless that you have been moving around a lot more than usual: 0 - not at all  Thoughts that you would be better off dead, or of hurting yourself in some way: 0 - not at all  PHQ-9 Score: 0   PHQ-9 Interpretation: No or Minimal depression             Review of Systems:     Review of Systems   Constitutional: Positive for fatigue  Negative for fever and unexpected weight change  HENT: Positive for congestion and rhinorrhea  Negative for sore throat and trouble swallowing  Respiratory: Positive for cough and choking  Negative for shortness of breath and wheezing  Cardiovascular: Negative for chest pain, palpitations and leg swelling  Gastrointestinal: Negative for abdominal pain, constipation, diarrhea, nausea and vomiting  Genitourinary: Negative for difficulty urinating  Musculoskeletal: Positive for arthralgias (bilateral knees) and back pain     Neurological: Positive for weakness and numbness (tingling bl hands)  Negative for dizziness, syncope and headaches  Psychiatric/Behavioral: Positive for sleep disturbance  All other systems reviewed and are negative  Past Medical History:     Past Medical History:   Diagnosis Date    Anxiety     Arthritis     Bipolar 1 disorder (HCC)     Chronic neck and back pain     Class 1 obesity due to excess calories with serious comorbidity and body mass index (BMI) of 32 0 to 32 9 in adult 3/13/2020    Depression     Dizziness     GERD (gastroesophageal reflux disease)     Headache     Hypertension     Joint pain     and swelling    Night sweats     PTSD (post-traumatic stress disorder)       Past Surgical History:     Past Surgical History:   Procedure Laterality Date    FACIAL FRACTURE SURGERY      FRACTURE SURGERY      ORTHOPEDIC SURGERY      TENDON REPAIR        Social History:     Social History     Socioeconomic History    Marital status: Single     Spouse name: None    Number of children: None    Years of education: None    Highest education level: None   Occupational History    None   Tobacco Use    Smoking status: Light Tobacco Smoker     Packs/day: 0 25    Smokeless tobacco: Never Used   Vaping Use    Vaping Use: Never used   Substance and Sexual Activity    Alcohol use: Yes     Comment: social    Drug use: Yes     Types: Marijuana    Sexual activity: Yes     Partners: Male     Birth control/protection: None   Other Topics Concern    None   Social History Narrative    None     Social Determinants of Health     Financial Resource Strain: Low Risk     Difficulty of Paying Living Expenses: Not hard at all   Food Insecurity: No Food Insecurity    Worried About Running Out of Food in the Last Year: Never true    Veronica of Food in the Last Year: Never true   Transportation Needs: No Transportation Needs    Lack of Transportation (Medical): No    Lack of Transportation (Non-Medical):  No Physical Activity: Not on file   Stress: Not on file   Social Connections: Not on file   Intimate Partner Violence: Not on file   Housing Stability: Not on file      Family History:     Family History   Problem Relation Age of Onset    Diabetes Mother     Hypertension Mother     Thyroid disease Mother     No Known Problems Father     No Known Problems Sister     No Known Problems Daughter     Lung cancer Maternal Grandmother     No Known Problems Maternal Grandfather     No Known Problems Paternal Grandmother     No Known Problems Paternal Grandfather     No Known Problems Brother     Heart disease Neg Hx     Stroke Neg Hx       Current Medications:     Current Outpatient Medications   Medication Sig Dispense Refill    methocarbamol (ROBAXIN) 500 mg tablet Take 1 tablet (500 mg total) by mouth 2 (two) times a day as needed for muscle spasms 60 tablet 0    omeprazole (PriLOSEC) 40 MG capsule Take 1 capsule (40 mg total) by mouth daily Omeprazole 20 MG Oral Capsule Delayed Release take 1 capsule by mouth once daily  Quantity: 30;  Refills: 1    Ewa Stevens PAC;  Started 2-Oct-2015 Active 90 capsule 1    topiramate (TOPAMAX) 25 mg tablet Take 2 tablets (50 mg total) by mouth 2 (two) times a day 120 tablet 2    ALPRAZolam (XANAX) 0 5 mg tablet Take 1 tablet (0 5 mg total) by mouth 3 (three) times a day as needed for anxiety 45 tablet 0    amLODIPine (NORVASC) 10 mg tablet Take 1 tablet (10 mg total) by mouth daily 30 tablet 5    benazepril (LOTENSIN) 20 mg tablet Take 1 tablet (20 mg total) by mouth daily 30 tablet 5    HYDROcodone-acetaminophen (Norco) 5-325 mg per tablet Take 1 tablet by mouth daily at bedtime as needed for pain Max Daily Amount: 1 tablet 30 tablet 0    Lurasidone HCl (Latuda) 60 MG TABS Take 1 tablet (60 mg total) by mouth daily 15 tablet 0    meloxicam (Mobic) 15 mg tablet Take 1 tablet (15 mg total) by mouth daily 30 tablet 2    metFORMIN (GLUCOPHAGE) 500 mg tablet Take 1 tablet (500 mg total) by mouth 2 (two) times a day with meals 180 tablet 3    naloxone (NARCAN) 4 mg/0 1 mL nasal spray Administer 1 spray into a nostril  If no response after 2-3 minutes, give another dose in the other nostril using a new spray  1 each 1    nicotine polacrilex (NICORETTE) 4 mg gum       traZODone (DESYREL) 100 mg tablet Take 1 tablet (100 mg total) by mouth daily at bedtime 15 tablet 0    traZODone (DESYREL) 150 mg tablet take 1 tablet by mouth once daily every evening      varenicline (CHANTIX) 1 mg tablet Take 1 mg by mouth 2 (two) times a day       No current facility-administered medications for this visit  Allergies:     No Known Allergies   Physical Exam:     /74 (BP Location: Right arm, Patient Position: Sitting, Cuff Size: Standard)   Pulse 73   Temp 98 2 °F (36 8 °C) (Temporal)   Resp 18   Ht 5' 1" (1 549 m)   Wt 75 6 kg (166 lb 11 2 oz)   LMP 05/10/2016   SpO2 98%   BMI 31 50 kg/m²     Physical Exam  Vitals reviewed  Constitutional:       General: She is not in acute distress  Appearance: She is obese  She is not ill-appearing or diaphoretic  HENT:      Head: Normocephalic and atraumatic  Right Ear: Tympanic membrane, ear canal and external ear normal       Left Ear: Tympanic membrane, ear canal and external ear normal       Nose: Nose normal       Mouth/Throat:      Mouth: Mucous membranes are moist       Pharynx: Oropharynx is clear  Eyes:      General: Lids are normal       Conjunctiva/sclera: Conjunctivae normal       Pupils: Pupils are equal, round, and reactive to light  Cardiovascular:      Rate and Rhythm: Normal rate and regular rhythm  Pulses: Normal pulses  Heart sounds: Normal heart sounds  No murmur heard  Pulmonary:      Effort: Pulmonary effort is normal  No tachypnea  Breath sounds: Normal breath sounds  No decreased breath sounds or wheezing  Musculoskeletal:      Right knee: No swelling or deformity   Tenderness present  Left knee: No swelling or deformity  Tenderness present  Right lower leg: No edema  Left lower leg: No edema  Lymphadenopathy:      Cervical: No cervical adenopathy  Skin:     General: Skin is warm and dry  Neurological:      Mental Status: She is alert and oriented to person, place, and time     Psychiatric:         Mood and Affect: Mood and affect normal           [facility]ell[facility]

## 2022-07-15 ENCOUNTER — TELEPHONE (OUTPATIENT)
Dept: FAMILY MEDICINE CLINIC | Facility: CLINIC | Age: 52
End: 2022-07-15

## 2022-07-15 PROBLEM — G89.29 CHRONIC BACK PAIN: Status: ACTIVE | Noted: 2018-09-24

## 2022-07-15 PROBLEM — M54.9 CHRONIC BACK PAIN: Status: ACTIVE | Noted: 2018-09-24

## 2022-07-15 PROBLEM — R29.818 SUSPECTED SLEEP APNEA: Status: ACTIVE | Noted: 2022-07-15

## 2022-07-15 PROBLEM — F33.42 RECURRENT MAJOR DEPRESSIVE DISORDER, IN FULL REMISSION (HCC): Status: ACTIVE | Noted: 2017-08-08

## 2022-07-15 RX ORDER — METHOCARBAMOL 500 MG/1
500 TABLET, FILM COATED ORAL 2 TIMES DAILY PRN
Qty: 60 TABLET | Refills: 0 | Status: SHIPPED | OUTPATIENT
Start: 2022-07-15

## 2022-07-15 RX ORDER — TOPIRAMATE 25 MG/1
50 TABLET ORAL 2 TIMES DAILY
Qty: 120 TABLET | Refills: 2 | Status: SHIPPED | OUTPATIENT
Start: 2022-07-15

## 2022-07-15 NOTE — ASSESSMENT & PLAN NOTE
Symptoms uncontrolled and may be contributing to waking up choking and coughing  Pt will eat and then lie down to watch TV or sleep  - Reviewed the lifestyle changes necessary to promote better control of symptoms, avoid trigger foods, eat small meals, stop smoking, and do not lie down for 2 hours after eating    - Refilled Prilosec

## 2022-07-15 NOTE — ASSESSMENT & PLAN NOTE
Pt concerned she may have sleep apnea, waking coughing and choking for the past several months  May be multifactorial as pt also follows with ENT for chronic congestion and frequently eats close to bedtime with known GERD  - Reviewed lifestyle changes that may help relieve symptoms    - Referral to Sleep Medicine

## 2022-07-15 NOTE — ASSESSMENT & PLAN NOTE
Pt has cut down to 1-2 cigarettes per day  - Tobacco cessation counseling provided  Reviewed health effects of continued smoking and effect on orthopedic surgery in particular

## 2022-07-15 NOTE — ASSESSMENT & PLAN NOTE
BP well-controlled, at goal in office today: 116/74    - Continue amlodipine 10 mg daily and benazepril 20 mg daily

## 2022-07-15 NOTE — ASSESSMENT & PLAN NOTE
Pt reporting left knee giving out while walking, recently fell even while using cane, pt requesting walker    - Order placed for rolling walker    - Continue to follow with orthopedics   - Smoking cessation recommended prior to knee replacement

## 2022-08-18 ENCOUNTER — OFFICE VISIT (OUTPATIENT)
Dept: PAIN MEDICINE | Facility: CLINIC | Age: 52
End: 2022-08-18
Payer: COMMERCIAL

## 2022-08-18 VITALS
OXYGEN SATURATION: 98 % | SYSTOLIC BLOOD PRESSURE: 128 MMHG | HEART RATE: 69 BPM | HEIGHT: 61 IN | WEIGHT: 172 LBS | DIASTOLIC BLOOD PRESSURE: 86 MMHG | BODY MASS INDEX: 32.47 KG/M2

## 2022-08-18 DIAGNOSIS — G89.29 CHRONIC BILATERAL LOW BACK PAIN WITHOUT SCIATICA: ICD-10-CM

## 2022-08-18 DIAGNOSIS — G89.4 CHRONIC PAIN SYNDROME: Primary | ICD-10-CM

## 2022-08-18 DIAGNOSIS — M46.1 SACROILIITIS (HCC): ICD-10-CM

## 2022-08-18 DIAGNOSIS — M79.18 PIRIFORMIS MUSCLE PAIN: ICD-10-CM

## 2022-08-18 DIAGNOSIS — M79.18 MYOFASCIAL PAIN SYNDROME: ICD-10-CM

## 2022-08-18 DIAGNOSIS — M54.50 CHRONIC BILATERAL LOW BACK PAIN WITHOUT SCIATICA: ICD-10-CM

## 2022-08-18 DIAGNOSIS — M54.9 MID BACK PAIN: ICD-10-CM

## 2022-08-18 PROCEDURE — 99214 OFFICE O/P EST MOD 30 MIN: CPT | Performed by: NURSE PRACTITIONER

## 2022-08-18 NOTE — PROGRESS NOTES
Assessment:  1  Chronic pain syndrome    2  Chronic bilateral low back pain without sciatica    3  Piriformis muscle pain    4  Sacroiliitis ([address] )    5  Mid back pain    6  Myofascial pain syndrome        Plan:  While the patient was in the office today, I did have a thorough conversation regarding their chronic pain syndrome, medication management, and treatment plan options  Patient is being seen for follow-up visit  She was last seen here on 12/10/2021 at which time she was scheduled for right SI joint injection and right piriformis trigger point injection  She was also scheduled for thoracic paraspinal trigger point injection  Unfortunately, there were some issues with her insurance  However, her insurance has recently changed and she would like to try to reschedule the injections  She tells me that she is not only having pain on the right side of her low back, but is now having pain across her low back and in bilateral buttocks  She will be scheduled for fluoroscopically guided bilateral SI joint injections, fluoroscopically guided bilateral piriformis trigger point injections and ultrasound-guided bilateral thoracic paraspinal trigger point injections  Continue Robaxin and meloxicam as prescribed by her PCP  Follow-up 1 month after the injections  History of Present Illness: The patient is a 45 y o  female seen  for a follow up office visit  The patient currently reports complaints of midback pain, low back pain, pain in bilateral buttocks  Current pain level is an 8/10  Quality pain is described as sharp, throbbing  Current pain medications includes:  PCP prescribes Robaxin 500 mg twice daily if needed for spasms and Mobic 15 mg daily   I have personally reviewed and/or updated the patient's past medical history, past surgical history, family history, social history, current medications, allergies, and vital signs today         Review of Systems:    Review of Systems Respiratory: Negative for shortness of breath  Cardiovascular: Negative for chest pain  Gastrointestinal: Negative for constipation, diarrhea, nausea and vomiting  Musculoskeletal: Positive for back pain, gait problem, joint swelling and myalgias  Negative for arthralgias  Skin: Negative for rash  Neurological: Positive for dizziness and weakness  Negative for seizures  All other systems reviewed and are negative  Past Medical History:   Diagnosis Date    Anxiety     Arthritis     Bipolar 1 disorder (HCC)     Chronic neck and back pain     Class 1 obesity due to excess calories with serious comorbidity and body mass index (BMI) of 32 0 to 32 9 in adult 03/13/2020    Depression     Dizziness     GERD (gastroesophageal reflux disease)     Headache     Headache(784 0)     Hypertension     Joint pain     and swelling    Night sweats     Osteoarthritis     PTSD (post-traumatic stress disorder)        Past Surgical History:   Procedure Laterality Date    FACIAL FRACTURE SURGERY      FRACTURE SURGERY      ORTHOPEDIC SURGERY      TENDON REPAIR         Family History   Problem Relation Age of Onset    Diabetes Mother     Hypertension Mother     Thyroid disease Mother     No Known Problems Father     No Known Problems Sister     No Known Problems Daughter     Lung cancer Maternal Grandmother     No Known Problems Maternal Grandfather     No Known Problems Paternal Grandmother     No Known Problems Paternal Grandfather     No Known Problems Brother     Heart disease Neg Hx     Stroke Neg Hx        Social History     Occupational History    Not on file   Tobacco Use    Smoking status: Light Tobacco Smoker     Packs/day: 0 25     Years: 35 00     Pack years: 8 75     Types: Cigarettes    Smokeless tobacco: Never Used   Vaping Use    Vaping Use: Never used   Substance and Sexual Activity    Alcohol use:  Yes     Alcohol/week: 6 0 - 7 0 standard drinks     Types: 3 Glasses of wine, 3 - 4 Cans of beer per week     Comment: social    Drug use: Yes     Types: Marijuana    Sexual activity: Yes     Partners: Female, Male     Birth control/protection: Post-menopausal, None         Current Outpatient Medications:     ALPRAZolam (XANAX) 0 5 mg tablet, Take 1 tablet (0 5 mg total) by mouth 3 (three) times a day as needed for anxiety, Disp: 45 tablet, Rfl: 0    amLODIPine (NORVASC) 10 mg tablet, Take 1 tablet (10 mg total) by mouth daily, Disp: 30 tablet, Rfl: 5    benazepril (LOTENSIN) 20 mg tablet, Take 1 tablet (20 mg total) by mouth daily, Disp: 30 tablet, Rfl: 5    HYDROcodone-acetaminophen (Norco) 5-325 mg per tablet, Take 1 tablet by mouth daily at bedtime as needed for pain Max Daily Amount: 1 tablet, Disp: 30 tablet, Rfl: 0    Lurasidone HCl (Latuda) 60 MG TABS, Take 1 tablet (60 mg total) by mouth daily, Disp: 15 tablet, Rfl: 0    meloxicam (Mobic) 15 mg tablet, Take 1 tablet (15 mg total) by mouth daily, Disp: 30 tablet, Rfl: 2    metFORMIN (GLUCOPHAGE) 500 mg tablet, Take 1 tablet (500 mg total) by mouth 2 (two) times a day with meals, Disp: 180 tablet, Rfl: 3    methocarbamol (ROBAXIN) 500 mg tablet, Take 1 tablet (500 mg total) by mouth 2 (two) times a day as needed for muscle spasms, Disp: 60 tablet, Rfl: 0    nicotine polacrilex (NICORETTE) 4 mg gum, , Disp: , Rfl:     omeprazole (PriLOSEC) 40 MG capsule, Take 1 capsule (40 mg total) by mouth daily Omeprazole 20 MG Oral Capsule Delayed Release take 1 capsule by mouth once daily  Quantity: 30;  Refills: 1    Ewa Stevens PAC;  Started 2-Oct-2015 Active, Disp: 90 capsule, Rfl: 1    topiramate (TOPAMAX) 25 mg tablet, Take 2 tablets (50 mg total) by mouth 2 (two) times a day, Disp: 120 tablet, Rfl: 2    traZODone (DESYREL) 100 mg tablet, Take 1 tablet (100 mg total) by mouth daily at bedtime, Disp: 15 tablet, Rfl: 0    traZODone (DESYREL) 150 mg tablet, take 1 tablet by mouth once daily every evening, Disp: , Rfl:   varenicline (CHANTIX) 1 mg tablet, Take 1 mg by mouth 2 (two) times a day, Disp: , Rfl:     naloxone (NARCAN) 4 mg/0 1 mL nasal spray, Administer 1 spray into a nostril  If no response after 2-3 minutes, give another dose in the other nostril using a new spray , Disp: 1 each, Rfl: 1    No Known Allergies    Physical Exam:    /86   Pulse 69   Ht 5' 1" (1 549 m)   Wt 78 kg (172 lb)   LMP 05/10/2016   SpO2 98%   BMI 32 50 kg/m²     Constitutional:normal, well developed, well nourished, alert, in no distress and non-toxic and no overt pain behavior  Eyes:anicteric  HEENT:grossly intact  Neck:supple, symmetric, trachea midline and no masses   Pulmonary:even and unlabored  Cardiovascular:No edema or pitting edema present  Skin:Normal without rashes or lesions and well hydrated  Psychiatric:Mood and affect appropriate  Neurologic:Cranial Nerves II-XII grossly intact  Musculoskeletal:Range of motion of the lumbar spine is limited in all planes  tenderness over bilateral SI joints  Compression test, Roberto's maneuver,Gaenslen's tests are positive bilaterally  There is tenderness over bilateral piriformis muscles  There is no point tenderness over bilateral thoracic paraspinal muscles        Imaging  FL spine and pain procedure    (Results Pending)   FL spine and pain procedure    (Results Pending)   US guidance    (Results Pending)         Orders Placed This Encounter   Procedures    FL spine and pain procedure    FL spine and pain procedure    US guidance

## 2022-08-22 ENCOUNTER — TELEPHONE (OUTPATIENT)
Dept: RADIOLOGY | Facility: MEDICAL CENTER | Age: 52
End: 2022-08-22

## 2022-08-22 NOTE — TELEPHONE ENCOUNTER
Please call patient to assess her pain and if she is still interested in rescheduling this  If pain is the same, she can just be rescheduled as d/t  issues, she was not called back  If pain has changed, she will need a visit, if she would like to schedule with us at all

## 2022-08-22 NOTE — TELEPHONE ENCOUNTER
Per Patient Advice Request:     Conversation: Reschedule my shots  (Newest Message First)  February 15, 2022    Kassie Noble RN  to [place], [place]  [facility] Coordinator [place]          3:52 PM  Please see below   Pt needs to reschedule 1/26 injection she was supposed to have but was not feeling well  Thank you,   BRE Gan  to [employer], DO          3:48 PM  I just found out today I have an appointment on the 28th February 14, 2022    Kyra Man RN  to [facility] Coordinator [place]    [place]      8:09 AM  Please see below     February 11, 2022    Aneudy Ochoa  to [employer], DO          5:58 PM  Can you please have my shots rescheduled  I've made several calls to the office and no one has called me back  I have new insurance ([employer])  Can someone please submit the paperwork to them so I can get my shots  My back is really hurting and I need help    This encounter is not signed  The conversation may still be ongoing

## 2022-08-25 ENCOUNTER — TELEPHONE (OUTPATIENT)
Dept: OTHER | Facility: OTHER | Age: 52
End: 2022-08-25

## 2022-08-25 NOTE — TELEPHONE ENCOUNTER
[facility] calling stating they received a prior authorization for the patient but it was a mistake and is not their patient, states that it is for a CT Sinus and date scheduled is September 2nd   Attempted to fax over to correct office and did not go through, please advise

## 2022-09-07 ENCOUNTER — APPOINTMENT (OUTPATIENT)
Dept: LAB | Facility: HOSPITAL | Age: 52
End: 2022-09-07
Payer: COMMERCIAL

## 2022-09-07 DIAGNOSIS — R20.2 PARESTHESIA: ICD-10-CM

## 2022-09-07 DIAGNOSIS — R79.89 LOW SERUM VITAMIN D: ICD-10-CM

## 2022-09-07 LAB
25(OH)D3 SERPL-MCNC: 38.6 NG/ML (ref 30–100)
FOLATE SERPL-MCNC: 14.1 NG/ML (ref 3.1–17.5)
VIT B12 SERPL-MCNC: 616 PG/ML (ref 100–900)

## 2022-09-07 PROCEDURE — 36415 COLL VENOUS BLD VENIPUNCTURE: CPT

## 2022-09-07 PROCEDURE — 82306 VITAMIN D 25 HYDROXY: CPT

## 2022-09-07 PROCEDURE — 82607 VITAMIN B-12: CPT

## 2022-09-07 PROCEDURE — 82746 ASSAY OF FOLIC ACID SERUM: CPT

## 2022-09-15 ENCOUNTER — HOSPITAL ENCOUNTER (OUTPATIENT)
Dept: RADIOLOGY | Facility: MEDICAL CENTER | Age: 52
End: 2022-09-15
Payer: COMMERCIAL

## 2022-09-15 VITALS
SYSTOLIC BLOOD PRESSURE: 154 MMHG | RESPIRATION RATE: 20 BRPM | HEART RATE: 63 BPM | DIASTOLIC BLOOD PRESSURE: 97 MMHG | OXYGEN SATURATION: 100 % | TEMPERATURE: 97.3 F

## 2022-09-15 DIAGNOSIS — G89.4 CHRONIC PAIN SYNDROME: ICD-10-CM

## 2022-09-15 DIAGNOSIS — M79.18 PIRIFORMIS MUSCLE PAIN: ICD-10-CM

## 2022-09-15 PROCEDURE — 20552 NJX 1/MLT TRIGGER POINT 1/2: CPT | Performed by: PHYSICAL MEDICINE & REHABILITATION

## 2022-09-15 PROCEDURE — 77002 NEEDLE LOCALIZATION BY XRAY: CPT | Performed by: PHYSICAL MEDICINE & REHABILITATION

## 2022-09-15 RX ORDER — BUPIVACAINE HCL/PF 2.5 MG/ML
10 VIAL (ML) INJECTION ONCE
Status: COMPLETED | OUTPATIENT
Start: 2022-09-15 | End: 2022-09-15

## 2022-09-15 RX ORDER — LIDOCAINE HYDROCHLORIDE 10 MG/ML
5 INJECTION, SOLUTION EPIDURAL; INFILTRATION; INTRACAUDAL; PERINEURAL ONCE
Status: COMPLETED | OUTPATIENT
Start: 2022-09-15 | End: 2022-09-15

## 2022-09-15 RX ORDER — METHYLPREDNISOLONE ACETATE 80 MG/ML
80 INJECTION, SUSPENSION INTRA-ARTICULAR; INTRALESIONAL; INTRAMUSCULAR; PARENTERAL; SOFT TISSUE ONCE
Status: COMPLETED | OUTPATIENT
Start: 2022-09-15 | End: 2022-09-15

## 2022-09-15 RX ADMIN — LIDOCAINE HYDROCHLORIDE 4 ML: 10 INJECTION, SOLUTION EPIDURAL; INFILTRATION; INTRACAUDAL; PERINEURAL at 08:32

## 2022-09-15 RX ADMIN — METHYLPREDNISOLONE ACETATE 80 MG: 80 INJECTION, SUSPENSION INTRA-ARTICULAR; INTRALESIONAL; INTRAMUSCULAR; PARENTERAL; SOFT TISSUE at 08:33

## 2022-09-15 RX ADMIN — Medication 5 ML: at 08:33

## 2022-09-15 RX ADMIN — IOHEXOL 2 ML: 300 INJECTION, SOLUTION INTRAVENOUS at 08:33

## 2022-09-15 NOTE — H&P
History of Present Illness: The patient is a 45 y o  female who presents with complaints of buttock pain    Patient Active Problem List   Diagnosis    Anxiety and depression    Bipolar disorder ([facility] [address] )    Gastroesophageal reflux disease without esophagitis    Benign hypertension    Low serum vitamin D    Lumbar degenerative disc disease    Migraines    Obesity (BMI 30 0-34  9)    Tobacco use    Primary osteoarthritis of left knee    Encounter for screening colonoscopy    Primary osteoarthritis of both knees    Chronic pain of both knees    Sacroiliitis (HCC)    Piriformis muscle pain    Idiopathic urticaria    Dizziness    Metabolic syndrome    Posttraumatic stress disorder    Thyroid nodule    Recurrent major depressive disorder, in full remission ([facility] [address] )    Mid back pain    Suspected sleep apnea    Chronic pain syndrome    Chronic bilateral low back pain without sciatica    Myofascial pain syndrome       Past Medical History:   Diagnosis Date    Anxiety     Arthritis     Bipolar 1 disorder (HCC)     Chronic neck and back pain     Class 1 obesity due to excess calories with serious comorbidity and body mass index (BMI) of 32 0 to 32 9 in adult 03/13/2020    Depression     Dizziness     GERD (gastroesophageal reflux disease)     Headache     Headache(784 0)     Hypertension     Joint pain     and swelling    Night sweats     Osteoarthritis     PTSD (post-traumatic stress disorder)        Past Surgical History:   Procedure Laterality Date    FACIAL FRACTURE SURGERY      FRACTURE SURGERY      ORTHOPEDIC SURGERY      TENDON REPAIR           Current Outpatient Medications:     ALPRAZolam (XANAX) 0 5 mg tablet, Take 1 tablet (0 5 mg total) by mouth 3 (three) times a day as needed for anxiety, Disp: 45 tablet, Rfl: 0    amLODIPine (NORVASC) 10 mg tablet, Take 1 tablet (10 mg total) by mouth daily, Disp: 30 tablet, Rfl: 5    benazepril (LOTENSIN) 20 mg tablet, Take 1 tablet (20 mg total) by mouth daily, Disp: 30 tablet, Rfl: 5    HYDROcodone-acetaminophen (Norco) 5-325 mg per tablet, Take 1 tablet by mouth daily at bedtime as needed for pain Max Daily Amount: 1 tablet, Disp: 30 tablet, Rfl: 0    Lurasidone HCl (Latuda) 60 MG TABS, Take 1 tablet (60 mg total) by mouth daily, Disp: 15 tablet, Rfl: 0    meloxicam (Mobic) 15 mg tablet, Take 1 tablet (15 mg total) by mouth daily, Disp: 30 tablet, Rfl: 2    metFORMIN (GLUCOPHAGE) 500 mg tablet, Take 1 tablet (500 mg total) by mouth 2 (two) times a day with meals, Disp: 180 tablet, Rfl: 3    methocarbamol (ROBAXIN) 500 mg tablet, Take 1 tablet (500 mg total) by mouth 2 (two) times a day as needed for muscle spasms, Disp: 60 tablet, Rfl: 0    naloxone (NARCAN) 4 mg/0 1 mL nasal spray, Administer 1 spray into a nostril   If no response after 2-3 minutes, give another dose in the other nostril using a new spray , Disp: 1 each, Rfl: 1    nicotine polacrilex (NICORETTE) 4 mg gum, , Disp: , Rfl:     omeprazole (PriLOSEC) 40 MG capsule, Take 1 capsule (40 mg total) by mouth daily Omeprazole 20 MG Oral Capsule Delayed Release take 1 capsule by mouth once daily  Quantity: 30;  Refills: 1    Ewa Stevens PAC;  Started 2-Oct-2015 Active, Disp: 90 capsule, Rfl: 1    topiramate (TOPAMAX) 25 mg tablet, Take 2 tablets (50 mg total) by mouth 2 (two) times a day, Disp: 120 tablet, Rfl: 2    traZODone (DESYREL) 100 mg tablet, Take 1 tablet (100 mg total) by mouth daily at bedtime, Disp: 15 tablet, Rfl: 0    traZODone (DESYREL) 150 mg tablet, take 1 tablet by mouth once daily every evening, Disp: , Rfl:     varenicline (CHANTIX) 1 mg tablet, Take 1 mg by mouth 2 (two) times a day, Disp: , Rfl:     Current Facility-Administered Medications:     bupivacaine (PF) (MARCAINE) 0 25 % injection 10 mL, 10 mL, Intra-articular, Once, [facility], DO    iohexol (OMNIPAQUE) 300 mg/mL injection 50 mL, 50 mL, Intra-articular, Once, [facility], DO  Birdie Prabhakar lidocaine (PF) (XYLOCAINE-MPF) 1 % injection 5 mL, 5 mL, Infiltration, Once, Corina Stallworth, DO    methylPREDNISolone acetate (DEPO-MEDROL) injection 80 mg, 80 mg, Intra-articular, Once, Corina Stallworth, DO    No Known Allergies    Physical Exam: There were no vitals filed for this visit  General: Awake, Alert, Oriented x 3, Mood and affect appropriate  Respiratory: Respirations even and unlabored  Cardiovascular: Peripheral pulses intact; no edema  Musculoskeletal Exam: Tenderness to palpation bilateral glutes  ASA Score: 2         Assessment:   1  Piriformis muscle pain    2   Chronic pain syndrome        Plan: B/L piriformis TPI

## 2022-09-15 NOTE — DISCHARGE INSTRUCTIONS
Do not apply heat to any area that is numb  If you have discomfort or soreness at the injection site, you may apply ice today, 20 minutes on and 20 minutes off  Tomorrow you may use ice or warm, moist heat  Do not apply ice or heat directly to the skin  If you experience severe shortness of breath, go to the Emergency Room  You may have numbness for several hours from the local anesthetic  Please use caution and common sense, especially with weight-bearing activities  You may have an increase or change in the discomfort for 36-48 hours after your treatment  Apply ice and continue with any pain medicine you have been prescribed  Do not do anything strenuous today  You may shower, but no tub baths or hot tubs today  You may resume your normal activities tomorrow, but do not overdo it  Resume normal activities slowly when you are feeling better  If you experience redness, drainage or swelling at the injection site, or if you develop a fever above 100 degrees, please call [facility] at [phone] or go to the Emergency Room  Continue to take all routine medicines prescribed by your primary care physician unless otherwise instructed by our staff  Most blood thinners should be started again according to your regularly scheduled dosing  If you have any questions, please give our office a call  As no general anesthesia was used in today's procedure, you should not experience any side effects related to anesthesia  If you have a problem specifically related to your procedure, please call our office at [phone]  Problems not related to your procedure should be directed to your primary care physician

## 2022-09-22 ENCOUNTER — TELEPHONE (OUTPATIENT)
Dept: PAIN MEDICINE | Facility: CLINIC | Age: 52
End: 2022-09-22

## 2022-09-22 NOTE — TELEPHONE ENCOUNTER
1st attempt call,unable to leave a msg to call back with % of improvement and pain level   Phone just rings

## 2022-10-07 ENCOUNTER — TELEPHONE (OUTPATIENT)
Dept: FAMILY MEDICINE CLINIC | Facility: CLINIC | Age: 52
End: 2022-10-07

## 2022-10-07 NOTE — TELEPHONE ENCOUNTER
first attempt to contact patient   left message to return my call on answering machine    RESCHEDULE 10/17/22 APPT

## 2022-10-17 ENCOUNTER — TELEPHONE (OUTPATIENT)
Dept: RADIOLOGY | Facility: MEDICAL CENTER | Age: 52
End: 2022-10-17

## 2022-10-17 NOTE — TELEPHONE ENCOUNTER
I wanted to know how you want to proceed with this patient:    10/13/2022-Cancelled Procedure  9/1-Cancelled Procedure  6/9-Cancelled Procedure  2/11-Cancelled Procedure  1/26-Cancelled Procedure    There have been numerous cancellations and reschedules and then cancels  Wanted to make you aware

## 2022-10-17 NOTE — TELEPHONE ENCOUNTER
Thank you for the heads up   Patient is scheduled for October 26 with Sheri  Please notify patient at follow-up visit that she can not no-show again or will need to be put on do not reschedule list   If patient does not show up for the follow-up visit with Satish Plan on 10/26 again will need to be put on do not reschedule schedule   Thank you

## 2022-10-19 NOTE — TELEPHONE ENCOUNTER
third attempt to contact patient   left message to return my call on answering machine    Appt was cancelled

## 2022-11-15 ENCOUNTER — DOCUMENTATION (OUTPATIENT)
Dept: RADIOLOGY | Facility: CLINIC | Age: 52
End: 2022-11-15

## 2022-11-15 NOTE — PROGRESS NOTES
Discharge letter created for no shows dated 1/26/2022, 2/11/2022,6/9/2022, 9/1/2022,10/13/2022 and 10/26/2022  Charlene Burgos, Practice Administrator and Susan Caicedo PA-C advised discharge from service  Discharge Letter sent to [facility] email with instruction to sign the letter, have it scanned for a copy to pt's chart and mail it as certified together with a list of Pain Specialists outside of [address] [place] [address]  Called pt to inform about discharge, lm and cb number provided

## 2022-11-22 ENCOUNTER — TELEPHONE (OUTPATIENT)
Dept: OBGYN CLINIC | Facility: CLINIC | Age: 52
End: 2022-11-22

## 2022-11-30 ENCOUNTER — TELEPHONE (OUTPATIENT)
Dept: PAIN MEDICINE | Facility: CLINIC | Age: 52
End: 2022-11-30

## 2022-11-30 NOTE — TELEPHONE ENCOUNTER
Tried to call to reschedule missed appointment with Ilda Parra on 11/9  Per message received, call cannot be completed at this time      11/30 LB

## 2022-12-20 ENCOUNTER — VBI (OUTPATIENT)
Dept: ADMINISTRATIVE | Facility: OTHER | Age: 52
End: 2022-12-20

## 2023-02-16 ENCOUNTER — OFFICE VISIT (OUTPATIENT)
Dept: FAMILY MEDICINE CLINIC | Facility: CLINIC | Age: 53
End: 2023-02-16

## 2023-02-16 VITALS
RESPIRATION RATE: 16 BRPM | BODY MASS INDEX: 32.47 KG/M2 | HEIGHT: 61 IN | SYSTOLIC BLOOD PRESSURE: 140 MMHG | OXYGEN SATURATION: 98 % | TEMPERATURE: 98 F | DIASTOLIC BLOOD PRESSURE: 80 MMHG | HEART RATE: 85 BPM | WEIGHT: 172 LBS

## 2023-02-16 DIAGNOSIS — E66.9 OBESITY (BMI 30.0-34.9): ICD-10-CM

## 2023-02-16 DIAGNOSIS — K21.9 GASTROESOPHAGEAL REFLUX DISEASE WITHOUT ESOPHAGITIS: ICD-10-CM

## 2023-02-16 DIAGNOSIS — F31.61 BIPOLAR DISORDER, CURRENT EPISODE MIXED, MILD (HCC): ICD-10-CM

## 2023-02-16 DIAGNOSIS — Z12.31 ENCOUNTER FOR SCREENING MAMMOGRAM FOR MALIGNANT NEOPLASM OF BREAST: ICD-10-CM

## 2023-02-16 DIAGNOSIS — Z72.0 TOBACCO USE: ICD-10-CM

## 2023-02-16 DIAGNOSIS — M17.12 PRIMARY OSTEOARTHRITIS OF LEFT KNEE: ICD-10-CM

## 2023-02-16 DIAGNOSIS — I10 BENIGN HYPERTENSION: ICD-10-CM

## 2023-02-16 DIAGNOSIS — Z00.00 ANNUAL PHYSICAL EXAM: Primary | ICD-10-CM

## 2023-02-16 DIAGNOSIS — Z13.6 SCREENING FOR HEART DISEASE: ICD-10-CM

## 2023-02-16 RX ORDER — OMEPRAZOLE 40 MG/1
40 CAPSULE, DELAYED RELEASE ORAL DAILY
Qty: 90 CAPSULE | Refills: 1 | Status: SHIPPED | OUTPATIENT
Start: 2023-02-16 | End: 2023-02-19

## 2023-02-16 RX ORDER — AMLODIPINE BESYLATE 10 MG/1
10 TABLET ORAL DAILY
Qty: 30 TABLET | Refills: 5 | Status: SHIPPED | OUTPATIENT
Start: 2023-02-16

## 2023-02-16 NOTE — PROGRESS NOTES
[facility]    NAME: Ximena Moore  AGE: 45 y o  SEX: female  : 1970     DATE: 2023     Assessment and Plan:     Problem List Items Addressed This Visit        Digestive    Gastroesophageal reflux disease without esophagitis     Intermittent use of omeprazole with continued symptoms   - Avoid trigger foods, large meals, and lying down within 2 hours after eating    - Smoking cessation and weight loss  - Omeprazole 40 mg daily  Cardiovascular and Mediastinum    Benign hypertension     BP borderline in office today: 140/80  Has not been taking benazepril  Admittedly poor adherence to amlodipine, has not taken recently  - Discontinue benazepril due to non-adherence  Restart amlodipine   - Recommend low-salt diet and daily physical activity as tolerated  - Nurse visit for BP recheck in 2 weeks  Relevant Medications    amLODIPine (NORVASC) 10 mg tablet    Other Relevant Orders    CBC and differential    Comprehensive metabolic panel       Musculoskeletal and Integument    Primary osteoarthritis of left knee     Chronic pain bilateral knees, L>R  PT completed  Would like to pursue knee replacement, aware smoking cessation needed  - Smoking cessation   - Follow up with Orthopedics  Other    Bipolar disorder ([facility] [address] )     Stable  No longer meeting with therapist, receiving medication management only with Dr Maribell Morris   - Continue medications and plan as per Psychiatry Nicole Stanton)  Obesity (BMI 30 0-34  9)     BMI Counseling: Body mass index is 32 5 kg/m²  The BMI is above normal  Nutrition recommendations include decreasing portion sizes, encouraging healthy choices of fruits and vegetables, limiting drinks that contain sugar and moderation in carbohydrate intake  Exercise recommendations include moderate physical activity 150 minutes/week   Rationale for BMI follow-up plan is due to patient being overweight or obese  Pt interested in weight loss medications  - Referred to weight management  - Pt to look into financial assistance for [employer]  Relevant Orders    Ambulatory Referral to Weight Management    Hemoglobin A1C    Tobacco use     Tobacco Cessation Counseling: Tobacco cessation counseling was provided  The patient is sincerely urged to quit consumption of tobacco  She is ready to quit tobacco  Medication options discussed  Pt is in the contemplative stage regarding smoking cessation  She wants to quit so that she can have knee surgery  Pt is down to 4 to 5 cigarettes per day and occasional vaping  Pt has gum and patches at home  Other Visit Diagnoses     Annual physical exam    -  Primary    Screening for heart disease        Relevant Orders    Lipid panel    Encounter for screening mammogram for malignant neoplasm of breast        Relevant Orders    Mammo screening bilateral w 3d & cad          Immunizations and preventive care screenings were discussed with patient today  Appropriate education was printed on patient's after visit summary  Counseling:  Dental Health: discussed importance of regular tooth brushing, flossing, and dental visits  · Exercise: the importance of regular exercise/physical activity was discussed  Recommend exercise 3-5 times per week for at least 30 minutes  BMI Counseling: Body mass index is 32 5 kg/m²  The BMI is above normal  Nutrition recommendations include decreasing portion sizes, encouraging healthy choices of fruits and vegetables, limiting drinks that contain sugar and moderation in carbohydrate intake  Exercise recommendations include moderate physical activity 150 minutes/week  Patient referred to weight management  Rationale for BMI follow-up plan is due to patient being overweight or obese  Tobacco Cessation Counseling: Tobacco cessation counseling was provided   The patient is sincerely urged to quit consumption of tobacco  She is ready to quit tobacco  Medication options discussed  Pt is in the contemplative stage regarding smoking cessation  She wants to quit so that she can have knee surgery  Pt is down to 4 to 5 cigarettes per day and occasional vaping  Pt has gum and patches at home  Return in about 2 weeks (around 3/2/2023) for Nurse visit for BP check  Chief Complaint:     Chief Complaint   Patient presents with   • Physical Exam      History of Present Illness:     Adult Annual Physical   Randye Scale presents to the office for a comprehensive physical exam  The patient reports struggling to lose weight, states she eats when she is stressed or bored  Pt tried metformin in past for weight loss but was ineffective  Diet and Physical Activity  · Diet/Nutrition: emotional eating, snacking due to boredom  · Exercise: no formal exercise, difficulty exercising due to knee pain  General Health  · Sleep: sleeps well  · Hearing: normal - bilateral   · Vision: no vision problems  · Dental: no dental visits for >1 year  /GYN Health  · Patient is: postmenopausal  · Last menstrual period: 2016     Review of Systems:     Review of Systems   Constitutional: Positive for appetite change (increased) and unexpected weight change  Negative for fatigue and fever  HENT: Negative for congestion, hearing loss, sore throat and trouble swallowing  Eyes: Negative for visual disturbance  Respiratory: Negative for cough, shortness of breath and wheezing  Cardiovascular: Negative for chest pain, palpitations and leg swelling  Gastrointestinal: Negative for abdominal pain, blood in stool, constipation, diarrhea, nausea and vomiting  Endocrine: Negative for polyuria  Genitourinary: Negative for decreased urine volume and dysuria  Musculoskeletal: Positive for arthralgias (knee)  Neurological: Negative for dizziness, weakness, light-headedness, numbness and headaches  Psychiatric/Behavioral: Negative for dysphoric mood and sleep disturbance  The patient is nervous/anxious  All other systems reviewed and are negative  Past Medical History:     Past Medical History:   Diagnosis Date   • Anxiety    • Arthritis    • Bipolar 1 disorder (HCC)    • Chronic neck and back pain    • Class 1 obesity due to excess calories with serious comorbidity and body mass index (BMI) of 32 0 to 32 9 in adult 03/13/2020   • Depression    • Dizziness    • GERD (gastroesophageal reflux disease)    • Headache    • Headache(784 0)    • Hypertension    • Joint pain     and swelling   • Night sweats    • Osteoarthritis    • PTSD (post-traumatic stress disorder)       Past Surgical History:     Past Surgical History:   Procedure Laterality Date   • FACIAL FRACTURE SURGERY     • FRACTURE SURGERY     • ORTHOPEDIC SURGERY     • TENDON REPAIR        Social History:     Social History     Socioeconomic History   • Marital status: Single     Spouse name: None   • Number of children: None   • Years of education: None   • Highest education level: None   Occupational History   • None   Tobacco Use   • Smoking status: Light Smoker     Packs/day: 0 25     Years: 35 00     Pack years: 8 75     Types: Cigarettes   • Smokeless tobacco: Never   Vaping Use   • Vaping Use: Never used   Substance and Sexual Activity   • Alcohol use:  Yes     Alcohol/week: 6 0 - 7 0 standard drinks     Types: 3 Glasses of wine, 3 - 4 Cans of beer per week     Comment: social   • Drug use: Yes     Types: Marijuana   • Sexual activity: Yes     Partners: Female, Male     Birth control/protection: Post-menopausal, None   Other Topics Concern   • None   Social History Narrative   • None     Social Determinants of Health     Financial Resource Strain: Low Risk    • Difficulty of Paying Living Expenses: Not hard at all   Food Insecurity: No Food Insecurity   • Worried About Running Out of Food in the Last Year: Never true   • Ran Out of Food in the Last Year: Never true   Transportation Needs: Unmet Transportation Needs   • Lack of Transportation (Medical): Yes   • Lack of Transportation (Non-Medical): Yes   Physical Activity: Not on file   Stress: Not on file   Social Connections: Not on file   Intimate Partner Violence: Not on file   Housing Stability: Not on file      Family History:     Family History   Problem Relation Age of Onset   • Diabetes Mother    • Hypertension Mother    • Thyroid disease Mother    • No Known Problems Father    • No Known Problems Sister    • No Known Problems Daughter    • Lung cancer Maternal Grandmother    • No Known Problems Maternal Grandfather    • No Known Problems Paternal Grandmother    • No Known Problems Paternal Grandfather    • No Known Problems Brother    • Heart disease Neg Hx    • Stroke Neg Hx       Current Medications:     Current Outpatient Medications   Medication Sig Dispense Refill   • amLODIPine (NORVASC) 10 mg tablet Take 1 tablet (10 mg total) by mouth daily 30 tablet 5   • Lurasidone HCl (Latuda) 60 MG TABS Take 1 tablet (60 mg total) by mouth daily 15 tablet 0   • meloxicam (Mobic) 15 mg tablet Take 1 tablet (15 mg total) by mouth daily 30 tablet 2   • methocarbamol (ROBAXIN) 500 mg tablet Take 1 tablet (500 mg total) by mouth 2 (two) times a day as needed for muscle spasms 60 tablet 0   • naloxone (NARCAN) 4 mg/0 1 mL nasal spray Administer 1 spray into a nostril  If no response after 2-3 minutes, give another dose in the other nostril using a new spray  1 each 1   • nicotine polacrilex (NICORETTE) 4 mg gum      • omeprazole (PriLOSEC) 40 MG capsule Take 1 capsule (40 mg total) by mouth daily 30 capsule 2   • topiramate (TOPAMAX) 25 mg tablet Take 2 tablets (50 mg total) by mouth 2 (two) times a day 120 tablet 2   • traZODone (DESYREL) 150 mg tablet take 1 tablet by mouth once daily every evening       No current facility-administered medications for this visit        Allergies:     No Known Allergies   Physical Exam:     /80 (BP Location: Left arm, Patient Position: Sitting, Cuff Size: Standard)   Pulse 85   Temp 98 °F (36 7 °C) (Temporal)   Resp 16   Ht 5' 1" (1 549 m)   Wt 78 kg (172 lb)   LMP 05/10/2016   SpO2 98%   Breastfeeding No   BMI 32 50 kg/m²     Physical Exam  Vitals reviewed  Constitutional:       General: She is not in acute distress  Appearance: She is obese  She is not ill-appearing or diaphoretic  HENT:      Head: Normocephalic and atraumatic  Right Ear: Tympanic membrane, ear canal and external ear normal       Left Ear: Tympanic membrane, ear canal and external ear normal       Nose: Nose normal       Mouth/Throat:      Mouth: Mucous membranes are moist       Pharynx: Oropharynx is clear  Eyes:      General: Lids are normal       Conjunctiva/sclera: Conjunctivae normal       Pupils: Pupils are equal, round, and reactive to light  Neck:      Thyroid: No thyromegaly or thyroid tenderness  Cardiovascular:      Rate and Rhythm: Normal rate and regular rhythm  Pulses: Normal pulses  Heart sounds: Normal heart sounds  No murmur heard  Pulmonary:      Effort: Pulmonary effort is normal  No tachypnea  Breath sounds: Normal breath sounds  No decreased breath sounds, wheezing or rales  Abdominal:      General: Bowel sounds are normal  There is no distension  Palpations: Abdomen is soft  Tenderness: There is no abdominal tenderness  There is no guarding  Musculoskeletal:      Cervical back: Neck supple  Right lower leg: No edema  Left lower leg: No edema  Lymphadenopathy:      Cervical: No cervical adenopathy  Skin:     General: Skin is warm and dry  Neurological:      Mental Status: She is alert and oriented to person, place, and time  Cranial Nerves: No cranial nerve deficit, dysarthria or facial asymmetry  Motor: Motor function is intact  No weakness     Psychiatric:         Attention and Perception: Attention normal          Mood and Affect: Mood and affect normal          Speech: Speech normal          Behavior: Behavior normal          Thought Content:  Thought content normal           Audra Oar, [facility]

## 2023-02-19 RX ORDER — OMEPRAZOLE 40 MG/1
40 CAPSULE, DELAYED RELEASE ORAL DAILY
OUTPATIENT
Start: 2023-02-19

## 2023-02-19 RX ORDER — OMEPRAZOLE 40 MG/1
40 CAPSULE, DELAYED RELEASE ORAL DAILY
Qty: 30 CAPSULE | Refills: 2 | Status: SHIPPED | OUTPATIENT
Start: 2023-02-19

## 2023-02-20 ENCOUNTER — TELEPHONE (OUTPATIENT)
Dept: OBGYN CLINIC | Facility: CLINIC | Age: 53
End: 2023-02-20

## 2023-02-21 NOTE — ASSESSMENT & PLAN NOTE
BP borderline in office today: 140/80  Has not been taking benazepril  Admittedly poor adherence to amlodipine, has not taken recently  - Discontinue benazepril due to non-adherence  Restart amlodipine   - Recommend low-salt diet and daily physical activity as tolerated  - Nurse visit for BP recheck in 2 weeks

## 2023-02-22 PROBLEM — M25.562 CHRONIC PAIN OF BOTH KNEES: Status: RESOLVED | Noted: 2021-01-27 | Resolved: 2023-02-22

## 2023-02-22 PROBLEM — F41.9 ANXIETY AND DEPRESSION: Status: RESOLVED | Noted: 2018-04-26 | Resolved: 2023-02-22

## 2023-02-22 PROBLEM — F32.A ANXIETY AND DEPRESSION: Status: RESOLVED | Noted: 2018-04-26 | Resolved: 2023-02-22

## 2023-02-22 PROBLEM — G89.29 CHRONIC PAIN OF BOTH KNEES: Status: RESOLVED | Noted: 2021-01-27 | Resolved: 2023-02-22

## 2023-02-22 PROBLEM — M25.561 CHRONIC PAIN OF BOTH KNEES: Status: RESOLVED | Noted: 2021-01-27 | Resolved: 2023-02-22

## 2023-02-22 NOTE — ASSESSMENT & PLAN NOTE
BMI Counseling: Body mass index is 32 5 kg/m²  The BMI is above normal  Nutrition recommendations include decreasing portion sizes, encouraging healthy choices of fruits and vegetables, limiting drinks that contain sugar and moderation in carbohydrate intake  Exercise recommendations include moderate physical activity 150 minutes/week  Rationale for BMI follow-up plan is due to patient being overweight or obese  Pt interested in weight loss medications  - Referred to weight management  - Pt to look into financial assistance for [employer]

## 2023-02-22 NOTE — ASSESSMENT & PLAN NOTE
Stable  No longer meeting with therapist, receiving medication management only with Dr Lashay Bello   - Continue medications and plan as per Psychiatry Steph Aburto

## 2023-02-22 NOTE — ASSESSMENT & PLAN NOTE
Chronic pain bilateral knees, L>R  PT completed  Would like to pursue knee replacement, aware smoking cessation needed  - Smoking cessation   - Follow up with Orthopedics

## 2023-02-22 NOTE — ASSESSMENT & PLAN NOTE
Intermittent use of omeprazole with continued symptoms   - Avoid trigger foods, large meals, and lying down within 2 hours after eating    - Smoking cessation and weight loss  - Omeprazole 40 mg daily

## 2023-02-22 NOTE — ASSESSMENT & PLAN NOTE
Tobacco Cessation Counseling: Tobacco cessation counseling was provided  The patient is sincerely urged to quit consumption of tobacco  She is ready to quit tobacco  Medication options discussed  Pt is in the contemplative stage regarding smoking cessation  She wants to quit so that she can have knee surgery  Pt is down to 4 to 5 cigarettes per day and occasional vaping  Pt has gum and patches at home

## 2023-03-07 ENCOUNTER — TELEPHONE (OUTPATIENT)
Dept: PAIN MEDICINE | Facility: CLINIC | Age: 53
End: 2023-03-07

## 2023-03-07 NOTE — TELEPHONE ENCOUNTER
Caller: patient    Doctor: Lorri Pitt    Reason for call: patient is requesting a list of other pain doctors mailed to her.     Call back#: [phone]

## 2023-03-07 NOTE — TELEPHONE ENCOUNTER
YVONNE  Mailed 3/7. You  [facility] 2 hours ago (2:18 PM)     VO  Can you please send a list of area pain providers to pt, per their request, ty!

## 2023-03-08 ENCOUNTER — TELEPHONE (OUTPATIENT)
Dept: OBGYN CLINIC | Facility: CLINIC | Age: 53
End: 2023-03-08

## 2023-03-09 NOTE — PROGRESS NOTES
Assessment/Plan:    Diagnoses and all orders for this visit:    Chronic pain of both knees  -     Injection procedure prior authorization; Future  -     Large joint arthrocentesis: L knee    Primary osteoarthritis of both knees  -     Injection procedure prior authorization; Future  -     Large joint arthrocentesis: L knee  -     Medial  Knee Brace    Chronic pain of left knee  -     Medial  Knee Brace    Left knee steroid injection provided today  She did receive benefit from Euflexxa this past round  Patient to start PT for strengthening  We have provided a medial  brace for pain control  Return for for left knee Synvisc One  Chief Complaint:     Chief Complaint   Patient presents with    Left Knee - Follow-up       Subjective:   Patient ID: Christine Hayes is a 44 y o  female  Patient returns for chronic left knee pain and osteoarthritis last seen she received of viscosupplementation injections which provided mild benefit  She is here today for a steroid injection  She has not started formal physical therapy as of yet  Review of Systems    The following portions of the patient's chart were reviewed and updated as appropriate:    Allergy:  No Known Allergies      Past Medical History:   Diagnosis Date    Anxiety     Class 1 obesity due to excess calories with serious comorbidity and body mass index (BMI) of 32 0 to 32 9 in adult 3/13/2020    Depression     Hypertension     PTSD (post-traumatic stress disorder)        Past Surgical History:   Procedure Laterality Date    FACIAL FRACTURE SURGERY      TENDON REPAIR         Social History     Socioeconomic History    Marital status: Single     Spouse name: Not on file    Number of children: Not on file    Years of education: Not on file    Highest education level: Not on file   Occupational History    Not on file   Social Needs    Financial resource strain: Not on file    Food insecurity     Worry: Not on file     Inability: Not on file    Transportation needs     Medical: Not on file     Non-medical: Not on file   Tobacco Use    Smoking status: Light Tobacco Smoker     Packs/day: 0 25    Smokeless tobacco: Never Used   Substance and Sexual Activity    Alcohol use: Yes     Comment: social    Drug use: Yes     Types: Marijuana    Sexual activity: Yes     Partners: Male     Birth control/protection: None   Lifestyle    Physical activity     Days per week: Not on file     Minutes per session: Not on file    Stress: Not on file   Relationships    Social connections     Talks on phone: Not on file     Gets together: Not on file     Attends Religion service: Not on file     Active member of club or organization: Not on file     Attends meetings of clubs or organizations: Not on file     Relationship status: Not on file    Intimate partner violence     Fear of current or ex partner: Not on file     Emotionally abused: Not on file     Physically abused: Not on file     Forced sexual activity: Not on file   Other Topics Concern    Not on file   Social History Narrative    Not on file       Family History   Problem Relation Age of Onset    No Known Problems Mother     No Known Problems Father     No Known Problems Sister     No Known Problems Daughter     Lung cancer Maternal Grandmother     No Known Problems Maternal Grandfather     No Known Problems Paternal Grandmother     No Known Problems Paternal Grandfather        Medications:    Current Outpatient Medications:     ALPRAZolam (XANAX) 1 mg tablet, ALPRAZolam 1 MG Oral Tablet  Quantity: 120;  Refills: 0   , M D ;  Started 25-June-2015 Active, Disp: , Rfl:     bismuth subsalicylate (PEPTO BISMOL) 262 MG chewable tablet, Chew 1 tablet (262 mg total) 4 (four) times a day (before meals and at bedtime), Disp: 56 tablet, Rfl: 0    celecoxib (CeleBREX) 200 mg capsule, Take 1 capsule (200 mg total) by mouth daily, Disp: 30 capsule, Rfl: 2    cloNIDine (CATAPRES-TTS-1) 0 1 mg/24 hr, Place 1 patch (0 1 mg total) on the skin once a week, Disp: 4 patch, Rfl: 5    cyclobenzaprine (FLEXERIL) 10 mg tablet, Take 1 tablet (10 mg total) by mouth 3 (three) times a day as needed for muscle spasms, Disp: 90 tablet, Rfl: 1    diclofenac sodium (VOLTAREN) 1 %, Apply 2 g topically 4 (four) times a day, Disp: 100 g, Rfl: 5    ergocalciferol (VITAMIN D2) 50,000 units, take 1 capsule by mouth every week, Disp: 4 capsule, Rfl: 2    fluticasone (FLONASE) 50 mcg/act nasal spray, 2 sprays into each nostril daily, Disp: 1 Bottle, Rfl: 3    gabapentin (NEURONTIN) 600 MG tablet, Take 1,200 mg by mouth 2 (two) times a day, Disp: , Rfl:     Lurasidone HCl (Latuda) 60 MG TABS, Latuda 60 mg tablet, Disp: , Rfl:     nicotine (NICODERM CQ) 21 mg/24 hr TD 24 hr patch, Place 1 patch on the skin every 24 hours, Disp: 28 patch, Rfl: 2    nicotine polacrilex (NICORETTE) 4 mg gum, , Disp: , Rfl:     omeprazole (PriLOSEC) 20 mg delayed release capsule, Take 1 capsule (20 mg total) by mouth daily Omeprazole 20 MG Oral Capsule Delayed Release take 1 capsule by mouth once daily  Quantity: 30;  Refills: 1    Ewa Stevens PAC;  Started 2-Oct-2015 Active, Disp: 90 capsule, Rfl: 1    omeprazole (PriLOSEC) 20 mg delayed release capsule, Take 1 capsule (20 mg total) by mouth 2 (two) times a day, Disp: 28 capsule, Rfl: 0    traZODone (DESYREL) 100 mg tablet, TraZODone HCl - 100 MG Oral Tablet  Quantity: 30;  Refills: 0   , M D ;  Started 17-Apr-2015 Active, Disp: , Rfl:     Patient Active Problem List   Diagnosis    Anxiety and depression    Bipolar disorder ([facility] [address] )    Gastroesophageal reflux disease without esophagitis    HTN (hypertension)    Low serum vitamin D    Lumbar degenerative disc disease    Migraines    Obesity (BMI 30 0-34  9)    Tobacco use    Primary osteoarthritis of left knee    Encounter for screening colonoscopy       Objective:  /87   Pulse 64   Ht 5' 1" (1 549 m) Wt 78 kg (172 lb)   LMP 05/10/2016   BMI 32 50 kg/m²     Left Knee Exam     Range of Motion   The patient has normal left knee ROM  Other   Erythema: absent  Sensation: normal  Pulse: present  Swelling: mild  Effusion: effusion present          Observations   Left Knee   Positive for effusion  Physical Exam  Vitals signs reviewed  Constitutional:       Appearance: She is well-developed  HENT:      Head: Normocephalic and atraumatic  Eyes:      Conjunctiva/sclera: Conjunctivae normal    Neck:      Musculoskeletal: Normal range of motion and neck supple  Cardiovascular:      Rate and Rhythm: Normal rate  Pulmonary:      Effort: Pulmonary effort is normal  No respiratory distress  Musculoskeletal:      Left knee: She exhibits effusion  Skin:     General: Skin is warm and dry  Neurological:      Mental Status: She is alert and oriented to person, place, and time  Psychiatric:         Behavior: Behavior normal            Neurologic Exam     Mental Status   Oriented to person, place, and time  Large joint arthrocentesis: L knee  Date/Time: 8/31/2020 1:38 PM  Consent given by: patient  Site marked: site marked  Timeout: Immediately prior to procedure a time out was called to verify the correct patient, procedure, equipment, support staff and site/side marked as required   Supporting Documentation  Indications: pain   Procedure Details  Location: knee - L knee  Preparation: Patient was prepped and draped in the usual sterile fashion  Needle size: 22 G  Ultrasound guidance: no  Approach: anterolateral  Medications administered: 4 mL lidocaine 1 %; 40 mg triamcinolone acetonide 40 mg/mL    Patient tolerance: patient tolerated the procedure well with no immediate complications  Dressing:  Sterile dressing applied          I have personally reviewed pertinent films in PACS  and I have personally reviewed the written report of the pertinent studies  Other

## 2023-07-17 NOTE — PROGRESS NOTES
Daily Note     Today's date: 2020  Patient name: Judge Taylor  : 1970  MRN: [id number]  Referring provider: Toyin Pereira PA-C  Dx:   Encounter Diagnosis     ICD-10-CM    1  Chronic bilateral thoracic back pain M54 6     G89 29    2  Lumbar pain M54 5                   Subjective:  Neeraj Polanco reports that her low back has been feeling very good, neck and upper back are more tight from stress today       Objective: See treatment diary below      Assessment: Tolerated treatment well  Patient received reduction in tightness reported in bilateral upper traps post manuals today  WIll continue to progress as able and work on scapular coordination and strength       Plan: Continue per plan of care        Precautions: None      Manuals 4/27 5/4 6/1 6/8 6/15 6/22 7/13      Supine mid thoracic PA thrust AF AF    AF AF      UT release   AF AF AF  AF      SOR   AF AF AF  AF      Seated CTJ thrust    AF AF        Neuro Re-Ed             Bridges 2X10 3X10    3X10 purple 3X10   purple        Supine clamshells Green  3X10 Green  3X10    Purple  3X10 purple  3X10      Scapular retractions Pink  3X10 Pink  3X10  Green  3X10 Green  3X10 [id number] green 3X10      Mod plank with hip abduciton      2X 10                                              Ther Ex             LTR 10 sec  X10 10 secX  10  10 sec  X10  10 sec  X 10 10 sec   X 10  10 sec   X10      Sidelying lumbar rotation stretch 10 sec  X10 10 sec  X10           Thoracic rotation 30 30  30 30 30 30      Hip adduction  5 sec  X20           Thoracic extension    10 sec  X 10 10 sec   X10  10 sec   10 10 sec  X 10                                             Ther Activity                                       Gait Training                                       Modalities             traction     65#/ 30#  10 min  70#  35#  10 min
no

## 2023-08-22 ENCOUNTER — TELEPHONE (OUTPATIENT)
Dept: BARIATRICS | Facility: CLINIC | Age: 53
End: 2023-08-22

## 2023-08-30 ENCOUNTER — CONSULT (OUTPATIENT)
Dept: BARIATRICS | Facility: CLINIC | Age: 53
End: 2023-08-30
Payer: COMMERCIAL

## 2023-08-30 VITALS
BODY MASS INDEX: 32.4 KG/M2 | RESPIRATION RATE: 16 BRPM | HEART RATE: 84 BPM | DIASTOLIC BLOOD PRESSURE: 60 MMHG | SYSTOLIC BLOOD PRESSURE: 122 MMHG | WEIGHT: 171.6 LBS | HEIGHT: 61 IN

## 2023-08-30 DIAGNOSIS — Z78.0 MENOPAUSE: ICD-10-CM

## 2023-08-30 DIAGNOSIS — F31.61 BIPOLAR DISORDER, CURRENT EPISODE MIXED, MILD (HCC): ICD-10-CM

## 2023-08-30 DIAGNOSIS — R63.5 ABNORMAL WEIGHT GAIN: ICD-10-CM

## 2023-08-30 DIAGNOSIS — I10 BENIGN HYPERTENSION: ICD-10-CM

## 2023-08-30 DIAGNOSIS — Z91.89 AT RISK FOR SLEEP APNEA: ICD-10-CM

## 2023-08-30 DIAGNOSIS — E66.9 OBESITY (BMI 30.0-34.9): Primary | ICD-10-CM

## 2023-08-30 DIAGNOSIS — K21.9 GASTROESOPHAGEAL REFLUX DISEASE WITHOUT ESOPHAGITIS: ICD-10-CM

## 2023-08-30 PROCEDURE — 99214 OFFICE O/P EST MOD 30 MIN: CPT | Performed by: NURSE PRACTITIONER

## 2023-08-30 RX ORDER — AMLODIPINE BESYLATE 10 MG/1
10 TABLET ORAL DAILY
COMMUNITY

## 2023-08-30 NOTE — PROGRESS NOTES
Assessment/Plan:    Obesity (BMI 30.0-34.9)  - Discussed options of HealthyCORE-Intensive Lifestyle Intervention Program, Very Low Calorie Diet-VLCD and Conservative Program and the role of weight loss medications. - Explained the importance of making lifestyle changes first before starting anti-obesity medications. - Patient should demonstrate lifestyle changes first before anti-obesity medication initiated. - Patient is interested in pursuing Conservative Program  - Initial weight loss goal of 5-10% weight loss for improved health  - Weight loss can improve patient's co-morbid conditions and/or prevent weight-related complications.  - Not a candidate for phentermine or Qsymia due to history of bipolar and uses marijuana daily. - Already on Topamax for migraines through primary care. - Stop Reggy Messier 5/8  - Struggling with symptoms of menopause and is having difficulty with weight loss in the setting of that. Will refer to menopause specialist Dr. Trina Jacques. - Getting labs checked through primary care - CBC, CMP, A1C, and lipid panel.  - Check TSH and fasting insulin. Goals:  Do not skip meals. Food log (ie.) www.myfitnesspal.com,[employer],calorieking. com,etc. baritastic (use [employer], dietdoctor. com or smartphone sruthi Kout for recipes)  No sugary beverages. At least 64oz of water daily. Increase physical activity by 10 minutes daily. Gradually increase physical activity to a goal of 5 days per week for 30 minutes of MODERATE intensity PLUS 2 days per week of FULL BODY resistance training (use smartphone apps [employer], Home Workout, etc.)  Start food logging, weighing and measuring food. 1414-4409 calories per day. Sample menu given. Keep up the good work with water intake at least 64 oz daily. Smoking cessation recommended. Marijuana cessation recommended.   Exercise limited due to pain, discussed upper body exercises and chair exercises, start 2 days per week for 5-10 minutes and gradually increase to goal of 5 days per week for 30 minutes. At risk for sleep apnea  - Stop bang 5/8  - Referral to sleep medicine placed. Gastroesophageal reflux disease without esophagitis  - Taking prilosec. May improve with weight loss and lifestyle modification. Continue management with prescribing provider. Benign hypertension  - Taking norvasc. May improve with weight loss and lifestyle modification. Continue management with prescribing provider. Bipolar disorder ([address])  - Taking Latuda and trazodone. Continue management with prescribing provider. Joya Abraham was seen today for consult. Diagnoses and all orders for this visit:    Obesity (BMI 30.0-34.9)  -     Ambulatory Referral to Weight Management  -     TSH, 3rd generation with Free T4 reflex; Future  -     Insulin, fasting; Future    At risk for sleep apnea  -     Ambulatory referral to Sleep Medicine; Future  -     TSH, 3rd generation with Free T4 reflex; Future  -     Insulin, fasting; Future    Menopause  -     Ambulatory Referral to Gynecology; Future  -     TSH, 3rd generation with Free T4 reflex; Future  -     Insulin, fasting; Future    Abnormal weight gain  -     Ambulatory Referral to Gynecology; Future  -     TSH, 3rd generation with Free T4 reflex; Future  -     Insulin, fasting; Future    Gastroesophageal reflux disease without esophagitis    Benign hypertension    Bipolar disorder, current episode mixed, mild (HCC)        Total time spent: 40 min, with >50% face-to-face time spent counseling patient on nonsurgical interventions for the treatment of excess weight. Discussed in detail nonsurgical options including intensive lifestyle intervention program, very low-calorie diet program and conservative program.  Discussed the role of weight loss medications. Counseled patient on diet behavior and exercise modification for weight loss.       Follow up in approximately 2 month nurse visit and 4 months with Non-Surgical Physician/Advanced Practitioner. Subjective:   Chief Complaint   Patient presents with   • Consult     MWM consult; waist 35in; goal wt 140; S/B 5-8       Patient ID: Cari Nixon  is a 52 y.o. female with excess weight/obesity here to pursue weight management. Previous notes and records have been reviewed. Past Medical History:   Diagnosis Date   • Anxiety    • Arthritis    • Bipolar 1 disorder ([address])    • Chronic neck and back pain    • Class 1 obesity due to excess calories with serious comorbidity and body mass index (BMI) of 32.0 to 32.9 in adult 03/13/2020   • Depression    • Dizziness    • GERD (gastroesophageal reflux disease)    • Headache    • Headache(784.0)    • Hypertension    • Joint pain     and swelling   • Night sweats    • Osteoarthritis    • PTSD (post-traumatic stress disorder)      Past Surgical History:   Procedure Laterality Date   • FACIAL FRACTURE SURGERY     • FRACTURE SURGERY     • ORTHOPEDIC SURGERY     • TENDON REPAIR         HPI:  Wt Readings from Last 20 Encounters:   08/30/23 77.8 kg (171 lb 9.6 oz)   02/16/23 78 kg (172 lb)   08/18/22 78 kg (172 lb)   07/13/22 75.6 kg (166 lb 11.2 oz)   05/19/22 79.8 kg (176 lb)   03/01/22 82.1 kg (181 lb)   01/24/22 79.8 kg (176 lb)   12/10/21 79.8 kg (176 lb)   12/10/21 78.5 kg (173 lb 1 oz)   11/22/21 78.5 kg (173 lb)   11/15/21 78.3 kg (172 lb 9.6 oz)   08/19/21 74.6 kg (164 lb 7.4 oz)   06/14/21 77.1 kg (170 lb)   05/17/21 77.1 kg (170 lb)   04/19/21 78.9 kg (174 lb)   03/08/21 81.6 kg (180 lb)   02/12/21 78.5 kg (173 lb)   02/10/21 77.1 kg (170 lb)   01/27/21 77.1 kg (170 lb)   01/20/21 77.1 kg (170 lb)     Obesity/Excess Weight:  Severity: Mild  Onset:  5-7 years    Modifiers: Diet and Exercise  Contributing factors:  Insufficient Physical Activity, Stress/Emotional Eating, Menopause and Depression  Associated symptoms: increased joint pain, decreased mobility and depression     Feels hungry often and having cravings for sugar. Struggling with menopause symptoms - hot flashes and feels she is having more difficulty losing weight with menopause. [facility] Nimo Ign[facility]. Hydration: about 64 oz water, 1 cup coffee weekly with sugar and creamer, 5-6 cups (12 oz) iced tea with sugar, 1-2 cups every few days soda, juice occ     Alcohol: 1-2 drink per week  Smoking: cigarettes 5-6 per day   Exercise: none  Occupation: [employer]   Sleep: tends to sleep during the day rather than at night, sleeps over 12 hours per day, but fragmented   STOP ban/8    Highest weight: 201 lbs  Current weight: 171.6 lbs  Goal weight: 130-135 lbs    Colonoscopy: UTD, due 2025  Mammogram: due, has order, encouraged to schedule    The following portions of the patient's history were reviewed and updated as appropriate: allergies, current medications, past family history, past medical history, past social history, past surgical history, and problem list.    Family History   Problem Relation Age of Onset   • Diabetes Mother    • Hypertension Mother    • Thyroid disease Mother    • No Known Problems Father    • No Known Problems Sister    • No Known Problems Daughter    • Lung cancer Maternal Grandmother    • No Known Problems Maternal Grandfather    • No Known Problems Paternal Grandmother    • No Known Problems Paternal Grandfather    • No Known Problems Brother    • Heart disease Neg Hx    • Stroke Neg Hx         Review of Systems   HENT: Negative for sore throat. Respiratory: Negative for cough and shortness of breath. Cardiovascular: Negative for chest pain and palpitations. Gastrointestinal: Positive for constipation (intermittent). Negative for abdominal pain, diarrhea, nausea and vomiting.        + GERD controlled with medication   Endocrine: Positive for heat intolerance. Negative for cold intolerance. Genitourinary: Negative for dysuria.    Musculoskeletal: Positive for arthralgias (chronic) and back pain (chronic). Skin: Negative for rash. Neurological: Negative for headaches. Psychiatric/Behavioral: Negative for suicidal ideas (or HI). + depression and anxiety controlled with medication       Objective:  /60   Pulse 84   Resp 16   Ht 5' 1" (1.549 m)   Wt 77.8 kg (171 lb 9.6 oz)   LMP 05/10/2016   BMI 32.42 kg/m²     Physical Exam  Vitals and nursing note reviewed. Constitutional   General appearance: Abnormal.  well developed and obese. Eyes No conjunctival injection. Ears, Nose, Mouth, and Throat Oral mucosa moist.   Pulmonary   Respiratory effort: No increased work of breathing or signs of respiratory distress. Cardiovascular     Examination of extremities for edema and/or varicosities: Normal.  no edema. Abdomen   Abdomen: Abnormal.  The abdomen was obese.     Musculoskeletal   Normal range of motion  Neurological   Gait and station: Normal.    Psychiatric   Orientation to person, place and time: Normal.    Affect: appropriate

## 2023-08-30 NOTE — ASSESSMENT & PLAN NOTE
- Taking prilosec. May improve with weight loss and lifestyle modification. Continue management with prescribing provider.

## 2023-08-30 NOTE — ASSESSMENT & PLAN NOTE
- Discussed options of HealthyCORE-Intensive Lifestyle Intervention Program, Very Low Calorie Diet-VLCD and Conservative Program and the role of weight loss medications. - Explained the importance of making lifestyle changes first before starting anti-obesity medications. - Patient should demonstrate lifestyle changes first before anti-obesity medication initiated. - Patient is interested in pursuing Conservative Program  - Initial weight loss goal of 5-10% weight loss for improved health  - Weight loss can improve patient's co-morbid conditions and/or prevent weight-related complications.  - Not a candidate for phentermine or Qsymia due to history of bipolar and uses marijuana daily. - Already on Topamax for migraines through primary care. - Stop Jon Garridoo 5/8  - Struggling with symptoms of menopause and is having difficulty with weight loss in the setting of that. Will refer to menopause specialist Dr. Gokul Rousseau. - Getting labs checked through primary care - CBC, CMP, A1C, and lipid panel.  - Check TSH and fasting insulin. Goals:  Do not skip meals. Food log (ie.) www.myfitnesspal.com,[employer],calorieking. com,etc. baritastic (use 60mo, dietdoctor. com or smartphone sruthi [employer] for recipes)  No sugary beverages. At least 64oz of water daily. Increase physical activity by 10 minutes daily. Gradually increase physical activity to a goal of 5 days per week for 30 minutes of MODERATE intensity PLUS 2 days per week of FULL BODY resistance training (use smartphone apps [employer], Home Workout, etc.)  Start food logging, weighing and measuring food. 7960-4232 calories per day. Sample menu given. Keep up the good work with water intake at least 64 oz daily. Smoking cessation recommended. Marijuana cessation recommended.   Exercise limited due to pain, discussed upper body exercises and chair exercises, start 2 days per week for 5-10 minutes and gradually increase to goal of 5 days per week for 30 minutes.

## 2023-08-30 NOTE — ASSESSMENT & PLAN NOTE
- Taking norvasc. May improve with weight loss and lifestyle modification. Continue management with prescribing provider.

## 2023-09-19 ENCOUNTER — VBI (OUTPATIENT)
Dept: ADMINISTRATIVE | Facility: OTHER | Age: 53
End: 2023-09-19

## 2023-10-02 ENCOUNTER — OFFICE VISIT (OUTPATIENT)
Dept: FAMILY MEDICINE CLINIC | Facility: CLINIC | Age: 53
End: 2023-10-02

## 2023-10-02 VITALS
HEART RATE: 59 BPM | OXYGEN SATURATION: 99 % | TEMPERATURE: 97.9 F | RESPIRATION RATE: 18 BRPM | HEIGHT: 61 IN | SYSTOLIC BLOOD PRESSURE: 112 MMHG | WEIGHT: 171.5 LBS | BODY MASS INDEX: 32.38 KG/M2 | DIASTOLIC BLOOD PRESSURE: 92 MMHG

## 2023-10-02 DIAGNOSIS — R42 DIZZINESS: ICD-10-CM

## 2023-10-02 DIAGNOSIS — E66.09 CLASS 1 OBESITY DUE TO EXCESS CALORIES WITHOUT SERIOUS COMORBIDITY WITH BODY MASS INDEX (BMI) OF 32.0 TO 32.9 IN ADULT: ICD-10-CM

## 2023-10-02 DIAGNOSIS — R79.89 LOW SERUM VITAMIN D: ICD-10-CM

## 2023-10-02 DIAGNOSIS — K21.9 GASTROESOPHAGEAL REFLUX DISEASE WITHOUT ESOPHAGITIS: ICD-10-CM

## 2023-10-02 DIAGNOSIS — M51.36 LUMBAR DEGENERATIVE DISC DISEASE: ICD-10-CM

## 2023-10-02 DIAGNOSIS — I10 BENIGN HYPERTENSION: Primary | ICD-10-CM

## 2023-10-02 PROCEDURE — 3074F SYST BP LT 130 MM HG: CPT | Performed by: FAMILY MEDICINE

## 2023-10-02 PROCEDURE — 99214 OFFICE O/P EST MOD 30 MIN: CPT | Performed by: FAMILY MEDICINE

## 2023-10-02 PROCEDURE — 3080F DIAST BP >= 90 MM HG: CPT | Performed by: FAMILY MEDICINE

## 2023-10-02 RX ORDER — MELOXICAM 15 MG/1
15 TABLET ORAL DAILY
Qty: 30 TABLET | Refills: 2 | Status: SHIPPED | OUTPATIENT
Start: 2023-10-02

## 2023-10-02 RX ORDER — AMLODIPINE BESYLATE 10 MG/1
10 TABLET ORAL DAILY
Qty: 90 TABLET | Refills: 1 | Status: SHIPPED | OUTPATIENT
Start: 2023-10-02

## 2023-10-02 RX ORDER — OMEPRAZOLE 40 MG/1
40 CAPSULE, DELAYED RELEASE ORAL DAILY
Qty: 30 CAPSULE | Refills: 2 | Status: SHIPPED | OUTPATIENT
Start: 2023-10-02

## 2023-10-02 NOTE — PROGRESS NOTES
Name: Shruti Ryan      : 1970      MRN: [id number]  Encounter Provider: Alisson Wisdom MD  Encounter Date: 10/2/2023   Encounter department: [facility] [facility]     1. Benign hypertension  Assessment & Plan:  Continue Amlodipine  Refill sent   Stay active     Orders:  -     amLODIPine (NORVASC) 10 mg tablet; Take 1 tablet (10 mg total) by mouth daily  -     CBC and differential; Future  -     Comprehensive metabolic panel; Future  -     Lipid panel; Future  -     Albumin / creatinine urine ratio; Future  -     TSH, 3rd generation with Free T4 reflex; Future  -     Hemoglobin A1C; Future    2. Gastroesophageal reflux disease without esophagitis  -     omeprazole (PriLOSEC) 40 MG capsule; Take 1 capsule (40 mg total) by mouth daily    3. Lumbar degenerative disc disease  Assessment & Plan:  Continue follow up with PM    Orders:  -     meloxicam (Mobic) 15 mg tablet; Take 1 tablet (15 mg total) by mouth daily    4. Low serum vitamin D    5. Class 1 obesity due to excess calories without serious comorbidity with body mass index (BMI) of 32.0 to 32.9 in adult  -     Hemoglobin A1C; Future    6. Dizziness  Comments:  Have BW done. Avoid skipping meals. Increase fluid intake            Subjective      Shruti Ryan presented to the office today for routine follow up for chronic conditions. Last seen in the office over a year ago. States she gets depressed and does not want to go out so she has not been following up with her appointments. Pt is concerned that she may have sleep apnea and requested a sleep study. Has chronic pain, for which she follows with Pain Management  Has not been seeing [facility], does not remember her medications and states she will work on getting into a Psychiatrist again. Denies SI   States she gets dizzy when first getting out of bed.  Admits she "sleeps a lot" "I just got up a little bit ago" (it is currently 3:30 pm), because she has so much pain she does not need to be out and about   Often eats once a day     Review of Systems   Musculoskeletal: Positive for arthralgias, back pain and gait problem. Neurological: Positive for dizziness. Psychiatric/Behavioral: Positive for sleep disturbance. The patient is nervous/anxious. All other systems reviewed and are negative. Current Outpatient Medications on File Prior to Visit   Medication Sig   • Lurasidone HCl (Latuda) 60 MG TABS Take 1 tablet (60 mg total) by mouth daily   • topiramate (TOPAMAX) 25 mg tablet Take 2 tablets (50 mg total) by mouth 2 (two) times a day   • traZODone (DESYREL) 150 mg tablet take 1 tablet by mouth once daily every evening   • [DISCONTINUED] amLODIPine (NORVASC) 10 mg tablet Take 10 mg by mouth daily   • [DISCONTINUED] meloxicam (Mobic) 15 mg tablet Take 1 tablet (15 mg total) by mouth daily   • [DISCONTINUED] omeprazole (PriLOSEC) 40 MG capsule Take 1 capsule (40 mg total) by mouth daily       Objective     /92 (BP Location: Left arm, Patient Position: Sitting, Cuff Size: Standard)   Pulse 59   Temp 97.9 °F (36.6 °C) (Temporal)   Resp 18   Ht 5' 1" (1.549 m)   Wt 77.8 kg (171 lb 8 oz)   LMP 05/10/2016   SpO2 99%   BMI 32.40 kg/m²     Physical Exam  Vitals and nursing note reviewed. Constitutional:       Appearance: She is well-developed. HENT:      Head: Normocephalic. Right Ear: External ear normal.      Left Ear: External ear normal.      Nose: Nose normal.   Eyes:      Conjunctiva/sclera: Conjunctivae normal.      Pupils: Pupils are equal, round, and reactive to light. Neck:      Thyroid: No thyromegaly. Cardiovascular:      Rate and Rhythm: Normal rate and regular rhythm. Heart sounds: Normal heart sounds. Pulmonary:      Effort: Pulmonary effort is normal.      Breath sounds: Normal breath sounds. Abdominal:      Palpations: Abdomen is soft. Tenderness: There is no abdominal tenderness.  There is no guarding or rebound. Musculoskeletal:         General: Tenderness present. Normal range of motion. Cervical back: Normal range of motion and neck supple. Skin:     General: Skin is dry. Neurological:      Mental Status: She is alert and oriented to person, place, and time. Deep Tendon Reflexes: Reflexes are normal and symmetric.        Jovan Christopher MD

## 2023-11-09 ENCOUNTER — VBI (OUTPATIENT)
Dept: ADMINISTRATIVE | Facility: OTHER | Age: 53
End: 2023-11-09

## 2023-11-09 NOTE — TELEPHONE ENCOUNTER
11/09/23 10:26 AM     [facility] [employer] SmartForm used to document caregap status.     Maria L Olsen IV discontinued, cath removed intact

## 2023-12-01 ENCOUNTER — TELEPHONE (OUTPATIENT)
Dept: FAMILY MEDICINE CLINIC | Facility: CLINIC | Age: 53
End: 2023-12-01

## 2023-12-01 NOTE — TELEPHONE ENCOUNTER
IEB FORM RECEIVED ON 12/1/2023  DELIVERED  TO [facility] TO REVIEW AND BE COMPLETED, SIGNED BY MD/ (INCLUDE LISC.  NUMBER), AND FAXED BACK IN 3 DAYS

## 2023-12-05 ENCOUNTER — OFFICE VISIT (OUTPATIENT)
Dept: FAMILY MEDICINE CLINIC | Facility: CLINIC | Age: 53
End: 2023-12-05

## 2023-12-05 ENCOUNTER — PATIENT OUTREACH (OUTPATIENT)
Dept: FAMILY MEDICINE CLINIC | Facility: CLINIC | Age: 53
End: 2023-12-05

## 2023-12-05 DIAGNOSIS — F31.61 BIPOLAR DISORDER, CURRENT EPISODE MIXED, MILD (HCC): Primary | ICD-10-CM

## 2023-12-05 DIAGNOSIS — F51.01 PRIMARY INSOMNIA: ICD-10-CM

## 2023-12-05 DIAGNOSIS — I10 BENIGN HYPERTENSION: ICD-10-CM

## 2023-12-05 DIAGNOSIS — M51.36 LUMBAR DEGENERATIVE DISC DISEASE: ICD-10-CM

## 2023-12-05 DIAGNOSIS — Z72.0 TOBACCO USE: ICD-10-CM

## 2023-12-05 PROCEDURE — 99214 OFFICE O/P EST MOD 30 MIN: CPT

## 2023-12-05 PROCEDURE — 3080F DIAST BP >= 90 MM HG: CPT

## 2023-12-05 PROCEDURE — 3077F SYST BP >= 140 MM HG: CPT

## 2023-12-05 RX ORDER — PAROXETINE 10 MG/1
10 TABLET, FILM COATED ORAL DAILY
Qty: 30 TABLET | Refills: 2 | Status: SHIPPED | OUTPATIENT
Start: 2023-12-05

## 2023-12-05 RX ORDER — LURASIDONE HYDROCHLORIDE 40 MG/1
40 TABLET, FILM COATED ORAL
Qty: 30 TABLET | Refills: 2 | Status: SHIPPED | OUTPATIENT
Start: 2023-12-05

## 2023-12-05 RX ORDER — TRAZODONE HYDROCHLORIDE 100 MG/1
100 TABLET ORAL
Qty: 60 TABLET | Refills: 2 | Status: SHIPPED | OUTPATIENT
Start: 2023-12-05

## 2023-12-05 NOTE — PROGRESS NOTES
Name: Isabell Macario      : 1970      MRN: [id number]  Encounter Provider: GRIS Goodman  Encounter Date: 2023   Encounter department: [address] [facility],[address]     1. Bipolar disorder, current episode mixed, mild ([address])  Assessment & Plan:  Currently with depression, moderately severe anxiety, and insomnia. Perseverative and tearful during appt. - Restart Latuda at 40 mg daily, take with food. - Start paroxetine 10 mg daily for depression, anxiety, and vasomotor symptoms of menopause.   - Restart trazodone 100 mg, 1/2 to 2 tabs HS PRN for sleep. - Provided list of local mental health providers, strongly recommend re-establishing care with psychiatry. - Follow up in 4 weeks. Orders:  -     PARoxetine (PAXIL) 10 mg tablet; Take 1 tablet (10 mg total) by mouth daily  -     lurasidone (Latuda) 40 mg tablet; Take 1 tablet (40 mg total) by mouth daily with dinner    2. Benign hypertension  Assessment & Plan:  BP above goal in office today: 140/82 initially and actually increased on repeat at 148/92. Pt in pain, having a hot flash, extremely anxious, has not slept, and is tearful throughout exam, likely contributing to increased readings. - Continue amlodipine 10 mg daily. - Try to implement low-salt diet and daily physical activity throughout the next month. - Will begin treatment for hot flashes and psychiatric symptoms which may have positive effect on BP.   - Return for recheck in 4 weeks. 3. Primary insomnia  -     traZODone (DESYREL) 100 mg tablet; Take 1 tablet (100 mg total) by mouth daily at bedtime    4. Lumbar degenerative disc disease  Assessment & Plan:  IEP form was completed by attending physician for help in home. 5. Tobacco use  Assessment & Plan:  Tobacco Cessation Counseling: Tobacco cessation counseling was provided.  The patient is sincerely urged to quit consumption of tobacco. She is not ready to quit tobacco. Tobacco Cessation Counseling: Tobacco cessation counseling was provided. The patient is sincerely urged to quit consumption of tobacco. She is not ready to quit tobacco.         Sharon Leiva presents to the office for blood pressure follow up. Pt is in tears and fanning herself. States she is not sleeping. She has been having hot flashes and severe anxiety. Denies A/V hallucinations or SI/HI. She used to follow with Dr. Yonathan Thomason at [place] but stopped going when her psychiatrist and therapist changed, has been off her psychiatric medications for about 1 year. Pt also with chronic back pain, for which she follows with Pain Management through [facility] and has a second set of injections scheduled for next week. Pain in SI joint has improved but pain in upper back has not. Pt has been unable to attend to IADLs without significant difficulty due to pain and MH symptoms. She is having difficulty managing her medications and appts. Pt had temporarily quit smoking but has resumed smoking 2 cigarettes per day due to anxiety. Pt has applied for a home health aid, IEB form was signed by Dr. Gemma Mena and faxed today. Review of Systems   Constitutional:  Positive for diaphoresis (hot flashes). Negative for appetite change, chills, fatigue, fever and unexpected weight change. Eyes:  Negative for visual disturbance. Respiratory:  Positive for cough. Negative for chest tightness, shortness of breath and wheezing. Cardiovascular:  Negative for chest pain, palpitations and leg swelling. Gastrointestinal:  Negative for abdominal pain, diarrhea, nausea and vomiting. Genitourinary:  Negative for difficulty urinating and dysuria. Musculoskeletal:  Positive for arthralgias, back pain and myalgias. Neurological:  Negative for dizziness, weakness, light-headedness, numbness and headaches. Psychiatric/Behavioral:  Positive for dysphoric mood and sleep disturbance.  Negative for hallucinations, self-injury and suicidal ideas. The patient is nervous/anxious. All other systems reviewed and are negative. Past Medical History:   Diagnosis Date    Anxiety     Arthritis     Bipolar 1 disorder (HCC)     Chronic neck and back pain     Class 1 obesity due to excess calories with serious comorbidity and body mass index (BMI) of 32.0 to 32.9 in adult 03/13/2020    Depression     Dizziness     GERD (gastroesophageal reflux disease)     Headache     Headache(784.0)     Hypertension     Joint pain     and swelling    Night sweats     Osteoarthritis     PTSD (post-traumatic stress disorder)      Past Surgical History:   Procedure Laterality Date    FACIAL FRACTURE SURGERY      FRACTURE SURGERY      ORTHOPEDIC SURGERY      TENDON REPAIR       Family History   Problem Relation Age of Onset    Diabetes Mother     Hypertension Mother     Thyroid disease Mother     No Known Problems Father     No Known Problems Sister     No Known Problems Daughter     Lung cancer Maternal Grandmother     No Known Problems Maternal Grandfather     No Known Problems Paternal Grandmother     No Known Problems Paternal Grandfather     No Known Problems Brother     Heart disease Neg Hx     Stroke Neg Hx      Social History     Socioeconomic History    Marital status: Single     Spouse name: None    Number of children: None    Years of education: None    Highest education level: None   Occupational History    None   Tobacco Use    Smoking status: Light Smoker     Packs/day: 0.25     Years: 35.00     Total pack years: 8.75     Types: Cigarettes    Smokeless tobacco: Never   Vaping Use    Vaping Use: Never used   Substance and Sexual Activity    Alcohol use:  Yes     Alcohol/week: 6.0 - 7.0 standard drinks of alcohol     Types: 3 Glasses of wine, 3 - 4 Cans of beer per week     Comment: social    Drug use: Yes     Types: Marijuana     Comment: daily    Sexual activity: Yes     Partners: Female, Male     Birth control/protection: Post-menopausal, None   Other Topics Concern    None   Social History Narrative    None     Social Determinants of Health     Financial Resource Strain: Low Risk  (2/15/2023)    Overall Financial Resource Strain (CARDIA)     Difficulty of Paying Living Expenses: Not hard at all   Food Insecurity: No Food Insecurity (2/15/2023)    Hunger Vital Sign     Worried About Running Out of Food in the Last Year: Never true     Ran Out of Food in the Last Year: Never true   Transportation Needs: Unmet Transportation Needs (2/15/2023)    PRAPARE - Transportation     Lack of Transportation (Medical): Yes     Lack of Transportation (Non-Medical): Yes   Physical Activity: Not on file   Stress: Not on file   Social Connections: Not on file   Intimate Partner Violence: Not on file   Housing Stability: Not on file     Current Outpatient Medications on File Prior to Visit   Medication Sig    amLODIPine (NORVASC) 10 mg tablet Take 1 tablet (10 mg total) by mouth daily    meloxicam (Mobic) 15 mg tablet Take 1 tablet (15 mg total) by mouth daily    omeprazole (PriLOSEC) 40 MG capsule Take 1 capsule (40 mg total) by mouth daily     No Known Allergies  Immunization History   Administered Date(s) Administered    INFLUENZA 08/28/2017, 09/17/2018, 09/17/2018, 01/24/2022    Influenza, recombinant, quadrivalent,injectable, preservative free 10/12/2020    Pneumococcal 09/01/2017    Pneumococcal Polysaccharide PPV23 08/28/2017    Tdap 07/30/2017, 07/30/2017       Objective     /92 (BP Location: Left arm, Patient Position: Sitting, Cuff Size: Adult)   Pulse 62   Temp (!) 96.5 °F (35.8 °C) (Temporal)   Resp 16   Wt 77.1 kg (170 lb)   LMP 05/10/2016   SpO2 99%   BMI 32.12 kg/m²     Physical Exam  Vitals reviewed. Constitutional:       General: She is not in acute distress. Appearance: She is obese. She is not ill-appearing or diaphoretic. HENT:      Head: Normocephalic and atraumatic.       Mouth/Throat:      Mouth: Mucous membranes are moist.   Eyes:      General: Lids are normal.      Conjunctiva/sclera: Conjunctivae normal.      Pupils: Pupils are equal, round, and reactive to light. Cardiovascular:      Rate and Rhythm: Normal rate and regular rhythm. Heart sounds: Normal heart sounds. No murmur heard. Pulmonary:      Effort: Pulmonary effort is normal. No tachypnea. Breath sounds: Normal breath sounds. No decreased breath sounds, wheezing, rhonchi or rales. Musculoskeletal:      Cervical back: Normal.      Thoracic back: Tenderness present. Lumbar back: Tenderness present. Right lower leg: No edema. Left lower leg: No edema. Skin:     General: Skin is warm and dry. Neurological:      Mental Status: She is alert and oriented to person, place, and time. Gait: Gait is intact. Psychiatric:         Attention and Perception: Attention normal.         Mood and Affect: Mood is anxious and depressed. Affect is tearful. Speech: Speech normal.         Behavior: Behavior normal.         Thought Content: Thought content does not include homicidal or suicidal ideation.        GRIS Ashby

## 2023-12-05 NOTE — PROGRESS NOTES
Fax:  12/5/2023    IEB form completed, signed by Dr. Bhaskar Gaytan. Faxed to [facility]. Note routed to  CM for review to determine if CM services are needed. Form provided to  to be scanned into Pt's chart. No further outreach is scheduled.

## 2023-12-07 VITALS
HEART RATE: 62 BPM | BODY MASS INDEX: 32.12 KG/M2 | WEIGHT: 170 LBS | DIASTOLIC BLOOD PRESSURE: 92 MMHG | RESPIRATION RATE: 16 BRPM | SYSTOLIC BLOOD PRESSURE: 148 MMHG | TEMPERATURE: 96.5 F | OXYGEN SATURATION: 99 %

## 2023-12-07 NOTE — ASSESSMENT & PLAN NOTE
Currently with depression, moderately severe anxiety, and insomnia. Perseverative and tearful during appt. - Restart Latuda at 40 mg daily, take with food. - Start paroxetine 10 mg daily for depression, anxiety, and vasomotor symptoms of menopause.   - Restart trazodone 100 mg, 1/2 to 2 tabs HS PRN for sleep. - Provided list of local mental health providers, strongly recommend re-establishing care with psychiatry. - Follow up in 4 weeks.

## 2023-12-07 NOTE — ASSESSMENT & PLAN NOTE
BP above goal in office today: 140/82 initially and actually increased on repeat at 148/92. Pt in pain, having a hot flash, extremely anxious, has not slept, and is tearful throughout exam, likely contributing to increased readings. - Continue amlodipine 10 mg daily. - Try to implement low-salt diet and daily physical activity throughout the next month. - Will begin treatment for hot flashes and psychiatric symptoms which may have positive effect on BP.   - Return for recheck in 4 weeks.

## 2024-01-15 ENCOUNTER — OFFICE VISIT (OUTPATIENT)
Dept: OBGYN CLINIC | Facility: MEDICAL CENTER | Age: 54
End: 2024-01-15
Payer: COMMERCIAL

## 2024-01-15 ENCOUNTER — APPOINTMENT (OUTPATIENT)
Dept: RADIOLOGY | Facility: MEDICAL CENTER | Age: 54
End: 2024-01-15
Payer: COMMERCIAL

## 2024-01-15 VITALS
BODY MASS INDEX: 31.72 KG/M2 | SYSTOLIC BLOOD PRESSURE: 162 MMHG | HEART RATE: 53 BPM | DIASTOLIC BLOOD PRESSURE: 85 MMHG | WEIGHT: 168 LBS | HEIGHT: 61 IN

## 2024-01-15 DIAGNOSIS — M25.561 PAIN IN BOTH KNEES, UNSPECIFIED CHRONICITY: ICD-10-CM

## 2024-01-15 DIAGNOSIS — M25.561 PAIN IN BOTH KNEES, UNSPECIFIED CHRONICITY: Primary | ICD-10-CM

## 2024-01-15 DIAGNOSIS — M17.0 BILATERAL PRIMARY OSTEOARTHRITIS OF KNEE: ICD-10-CM

## 2024-01-15 DIAGNOSIS — M25.562 PAIN IN BOTH KNEES, UNSPECIFIED CHRONICITY: ICD-10-CM

## 2024-01-15 DIAGNOSIS — M25.562 PAIN IN BOTH KNEES, UNSPECIFIED CHRONICITY: Primary | ICD-10-CM

## 2024-01-15 PROCEDURE — 73564 X-RAY EXAM KNEE 4 OR MORE: CPT

## 2024-01-15 PROCEDURE — 99213 OFFICE O/P EST LOW 20 MIN: CPT | Performed by: ORTHOPAEDIC SURGERY

## 2024-01-15 RX ORDER — GABAPENTIN 600 MG/1
600 TABLET ORAL 3 TIMES DAILY
COMMUNITY

## 2024-01-15 NOTE — PROGRESS NOTES
Ortho Sports Medicine Knee New Patient Visit     Assesment:   53 y.o. female bilateral knee osteoarthritis , left severe medial djd    Plan:    She will be refereed to Dr. Sue to discus a total knee arthroplasty. She underwent knee CSI's and Visco injections in the past. She has tried multiple different knee braces as well as formal PT.     Conservative treatment:    Ice to knee for 20 minutes at least 1-2 times daily.  OTC NSAIDS prn for pain.  Tylenol for pain.    Imaging:    All imaging from today was reviewed by myself and explained to the patient.       Injection:    No Injection planned at this time.      Surgery:     No surgery is recommended at this point, continue with conservative treatment plan as noted.            Chief Complaint   Patient presents with    Left Knee - Pain     Pain scale 7     Right Knee - Pain     10 pain scale       History of Present Illness:    The patient is a 53 y.o. female whose occupation is none, referred to me by themself, seen in clinic for evaluation of bilateral knee pain.      Pain is located anterior.  The patient rates the pain as a 10/10.  The pain has been present for multiple years.    The pain is characterized as burning.  The pain is present at all times.      Pain is improved by rest. She denies any aggravating facts as she notes she is in constant pain.    Symptoms include pain.     The patient has tried rest, ice, NSAIDS, physical therapy, bracing, and injection.          Knee Surgical History:  None    Past Medical, Social and Family History:  Past Medical History:   Diagnosis Date    Anxiety     Arthritis     Bipolar 1 disorder (HCC)     Chronic neck and back pain     Class 1 obesity due to excess calories with serious comorbidity and body mass index (BMI) of 32.0 to 32.9 in adult 03/13/2020    Depression     Dizziness     GERD (gastroesophageal reflux disease)     Headache     Headache(784.0)     Hypertension     Joint pain     and swelling    Night sweats      Osteoarthritis     PTSD (post-traumatic stress disorder)      Past Surgical History:   Procedure Laterality Date    FACIAL FRACTURE SURGERY      FRACTURE SURGERY      ORTHOPEDIC SURGERY      TENDON REPAIR       No Known Allergies  Current Outpatient Medications on File Prior to Visit   Medication Sig Dispense Refill    amLODIPine (NORVASC) 10 mg tablet Take 1 tablet (10 mg total) by mouth daily 90 tablet 1    gabapentin (NEURONTIN) 600 MG tablet Take 600 mg by mouth 3 (three) times a day      lurasidone (Latuda) 40 mg tablet Take 1 tablet (40 mg total) by mouth daily with dinner 30 tablet 2    meloxicam (Mobic) 15 mg tablet Take 1 tablet (15 mg total) by mouth daily 30 tablet 2    omeprazole (PriLOSEC) 40 MG capsule Take 1 capsule (40 mg total) by mouth daily 30 capsule 2    PARoxetine (PAXIL) 10 mg tablet Take 1 tablet (10 mg total) by mouth daily 30 tablet 2    traZODone (DESYREL) 100 mg tablet Take 1 tablet (100 mg total) by mouth daily at bedtime 60 tablet 2     No current facility-administered medications on file prior to visit.     Social History     Socioeconomic History    Marital status: Single     Spouse name: Not on file    Number of children: Not on file    Years of education: Not on file    Highest education level: Not on file   Occupational History    Not on file   Tobacco Use    Smoking status: Light Smoker     Current packs/day: 0.25     Average packs/day: 0.3 packs/day for 35.0 years (8.8 ttl pk-yrs)     Types: Cigarettes    Smokeless tobacco: Never   Vaping Use    Vaping status: Never Used   Substance and Sexual Activity    Alcohol use: Yes     Alcohol/week: 6.0 - 7.0 standard drinks of alcohol     Types: 3 Glasses of wine, 3 - 4 Cans of beer per week     Comment: social    Drug use: Yes     Types: Marijuana     Comment: daily    Sexual activity: Yes     Partners: Female, Male     Birth control/protection: Post-menopausal, None   Other Topics Concern    Not on file   Social History Narrative     "Not on file     Social Determinants of Health     Financial Resource Strain: Low Risk  (2/15/2023)    Overall Financial Resource Strain (CARDIA)     Difficulty of Paying Living Expenses: Not hard at all   Food Insecurity: No Food Insecurity (2/15/2023)    Hunger Vital Sign     Worried About Running Out of Food in the Last Year: Never true     Ran Out of Food in the Last Year: Never true   Transportation Needs: Unmet Transportation Needs (2/15/2023)    PRAPARE - Transportation     Lack of Transportation (Medical): Yes     Lack of Transportation (Non-Medical): Yes   Physical Activity: Not on file   Stress: Not on file   Social Connections: Not on file   Intimate Partner Violence: Not on file   Housing Stability: Not on file         I have reviewed the past medical, surgical, social and family history, medications and allergies as documented in the EMR.    Review of systems: ROS is negative other than that noted in the HPI.  Constitutional: Negative for fatigue and fever.   HENT: Negative for sore throat.    Respiratory: Negative for shortness of breath.    Cardiovascular: Negative for chest pain.   Gastrointestinal: Negative for abdominal pain.   Endocrine: Negative for cold intolerance and heat intolerance.   Genitourinary: Negative for flank pain.   Musculoskeletal: Negative for back pain.   Skin: Negative for rash.   Allergic/Immunologic: Negative for immunocompromised state.   Neurological: Negative for dizziness.   Psychiatric/Behavioral: Negative for agitation.      Physical Exam:    Blood pressure 162/85, pulse (!) 53, height 5' 1\" (1.549 m), weight 76.2 kg (168 lb), last menstrual period 05/10/2016, not currently breastfeeding.    General/Constitutional: NAD, well developed, well nourished  HENT: Normocephalic, atraumatic  CV: Intact distal pulses, regular rate  Resp: No respiratory distress or labored breathing  GI: Soft and non-tender   Lymphatic: No lymphadenopathy palpated  Neuro: Alert and Oriented x 3, " no focal deficits  Psych: Normal mood, normal affect, normal judgement, normal behavior  Skin: Warm, dry, no rashes, no erythema      Knee Exam (focused):                RIGHT LEFT   ROM:   0-130 0-130   Palpation: Effusion negative negative     MJL tenderness Positive Positive     LJL tenderness Negative Negative   Meniscus: David Negative Negative    Apley's Compression Negative Negative   Instability: Varus stable stable     Valgus stable stable   Special Tests: Lachman Negative Negative     Posterior drawer Negative Negative     Anterior drawer Negative Negative     Pivot shift not tested not tested     Dial not tested not tested   Patella: Palpation no tenderness no tenderness     Mobility 1/4 1/4     Apprehension Negative Negative   Other: Single leg 1/4 squat not tested not tested      LE NV Exam: +2 DP/PT pulses bilaterally  Sensation intact to light touch L2-S1 bilaterally     Bilateral hip ROM demonstrates no pain actively or passively    No calf tenderness to palpation bilaterally    Knee Imaging    X-rays of the bilateral knee were reviewed, which demonstrate severe medial djd on left.  I have reviewed the radiology report and do not currently have a radiology reading from [facility], but will check the result once the reading is performed.      Scribe Attestation      I,:  Adri Mckinney am acting as a scribe while in the presence of the attending physician.:       I,:  Kai Sarmiento, DO personally performed the services described in this documentation    as scribed in my presence.:

## 2024-02-14 DIAGNOSIS — K21.9 GASTROESOPHAGEAL REFLUX DISEASE WITHOUT ESOPHAGITIS: ICD-10-CM

## 2024-02-14 RX ORDER — OMEPRAZOLE 40 MG/1
40 CAPSULE, DELAYED RELEASE ORAL DAILY
Qty: 30 CAPSULE | Refills: 2 | Status: SHIPPED | OUTPATIENT
Start: 2024-02-14

## 2024-02-27 ENCOUNTER — APPOINTMENT (OUTPATIENT)
Dept: RADIOLOGY | Facility: MEDICAL CENTER | Age: 54
End: 2024-02-27
Payer: COMMERCIAL

## 2024-02-27 ENCOUNTER — OFFICE VISIT (OUTPATIENT)
Dept: OBGYN CLINIC | Facility: MEDICAL CENTER | Age: 54
End: 2024-02-27

## 2024-02-27 VITALS
BODY MASS INDEX: 31.3 KG/M2 | HEIGHT: 61 IN | SYSTOLIC BLOOD PRESSURE: 139 MMHG | HEART RATE: 71 BPM | WEIGHT: 165.8 LBS | DIASTOLIC BLOOD PRESSURE: 80 MMHG

## 2024-02-27 DIAGNOSIS — Z53.21 PATIENT LEFT WITHOUT BEING SEEN: Primary | ICD-10-CM

## 2024-02-27 DIAGNOSIS — M25.561 PAIN IN BOTH KNEES, UNSPECIFIED CHRONICITY: ICD-10-CM

## 2024-02-27 DIAGNOSIS — M25.562 PAIN IN BOTH KNEES, UNSPECIFIED CHRONICITY: ICD-10-CM

## 2024-02-27 PROCEDURE — 77073 BONE LENGTH STUDIES: CPT

## 2024-02-27 NOTE — PROGRESS NOTES
Knee New Office Note    Assessment:     1. Pain in both knees, unspecified chronicity        Plan:     Problem List Items Addressed This Visit    None  Visit Diagnoses       Pain in both knees, unspecified chronicity    -  Primary    Relevant Orders    XR bone length (scanogram)           Findings today are consistent with bilateral knee osteoarthritis. Imaging and prognosis was reviewed with the patient today. ***    Subjective:     Patient ID: Neda Anna is a 53 y.o. female.  Chief Complaint:  HPI:  53 y.o. female presents to the office for evaluation of bilateral knee pain ongoing for multiple years. She was referred to us by Dr. Sarmiento. ***      Allergy:  No Known Allergies  Medications:  all current active meds have been reviewed  Past Medical History:  Past Medical History:   Diagnosis Date    Anxiety     Arthritis     Bipolar 1 disorder (HCC)     Chronic neck and back pain     Class 1 obesity due to excess calories with serious comorbidity and body mass index (BMI) of 32.0 to 32.9 in adult 03/13/2020    Depression     Dizziness     GERD (gastroesophageal reflux disease)     Headache     Headache(784.0)     Hypertension     Joint pain     and swelling    Night sweats     Osteoarthritis     PTSD (post-traumatic stress disorder)      Past Surgical History:  Past Surgical History:   Procedure Laterality Date    FACIAL FRACTURE SURGERY      FRACTURE SURGERY      ORTHOPEDIC SURGERY      TENDON REPAIR       Family History:  Family History   Problem Relation Age of Onset    Diabetes Mother     Hypertension Mother     Thyroid disease Mother     No Known Problems Father     No Known Problems Sister     No Known Problems Daughter     Lung cancer Maternal Grandmother     No Known Problems Maternal Grandfather     No Known Problems Paternal Grandmother     No Known Problems Paternal Grandfather     No Known Problems Brother     Heart disease Neg Hx     Stroke Neg Hx      Social History:  Social History     Substance  and Sexual Activity   Alcohol Use Yes    Alcohol/week: 6.0 - 7.0 standard drinks of alcohol    Types: 3 Glasses of wine, 3 - 4 Cans of beer per week    Comment: social     Social History     Substance and Sexual Activity   Drug Use Yes    Types: Marijuana    Comment: daily     Social History     Tobacco Use   Smoking Status Light Smoker    Current packs/day: 0.25    Average packs/day: 0.3 packs/day for 35.0 years (8.8 ttl pk-yrs)    Types: Cigarettes   Smokeless Tobacco Never           ROS:  General: Per HPI  Skin: Negative, except if noted below  HEENT: Negative  Respiratory: Negative  Cardiovascular: Negative  Gastrointestinal: Negative  Urinary: Negative  Vascular: Negative  Musculoskeletal: Positive per HPI   Neurologic: Positive per HPI  Endocrine: Negative    Objective:  BP Readings from Last 1 Encounters:   02/27/24 139/80      Wt Readings from Last 1 Encounters:   02/27/24 75.2 kg (165 lb 12.8 oz)        Respiratory:   non-labored respirations    Lymphatics:  no palpable lymph nodes    Gait:   ***    Neurologic:   Alert and oriented times ***  Patient with normal sensation except as noted below  Deep tendon reflexes 2+ except as noted in MSK exam    Bilateral Lower Extremity:  Left Knee:      Inspection:  {skin intact/incisions}    Overall limb alignment {varus/valgus}    Effusion: ***    ROM *** {with/wo} pain    Extensor Lag: ***    Palpation: {medial/lateral/no} Joint line tenderness to palpation    AP Stability at 90 deg ***    M/L stability in full extension ***    M/L stability in midflexion ***    Motor: {5}/5 Q/HS/TA/GS/P    Pulses: ***+ DP / ***+ PT    SILT DP/SP/S/S/TN    Right knee:     Inspection:  {skin intact/incisions}    Overall limb alignment {varus/valgus}    Effusion: ***    ROM *** {with/wo} pain    Extensor Lag: ***    Palpation: {medial/lateral/no} Joint line tenderness to palpation    AP Stability at 90 deg ***    M/L stability in full extension ***    M/L stability in midflexion ***    " Motor: {5}/5 Q/HS/TA/GS/P    Pulses: ***+ DP / ***+ PT    SILT DP/SP/S/S/TN    Imaging:  My interpretation XR AP scanogram/AP bilateral knee/lateral/jimenez/sunrise bilateral knee: moderate medial joint space narrowing, subchondral sclerosis, subchondral cysts, osteophyte formation. No fracture or dislocation.     BMI:   Estimated body mass index is 31.33 kg/m² as calculated from the following:    Height as of this encounter: 5' 1\" (1.549 m).    Weight as of this encounter: 75.2 kg (165 lb 12.8 oz).  BSA:   Estimated body surface area is 1.74 meters squared as calculated from the following:    Height as of this encounter: 5' 1\" (1.549 m).    Weight as of this encounter: 75.2 kg (165 lb 12.8 oz).           Scribe Attestation      I,:   am acting as a scribe while in the presence of the attending physician.:       I,:   personally performed the services described in this documentation    as scribed in my presence.:              "

## 2024-03-11 ENCOUNTER — OFFICE VISIT (OUTPATIENT)
Dept: OBGYN CLINIC | Facility: MEDICAL CENTER | Age: 54
End: 2024-03-11
Payer: COMMERCIAL

## 2024-03-11 VITALS
WEIGHT: 165 LBS | DIASTOLIC BLOOD PRESSURE: 80 MMHG | SYSTOLIC BLOOD PRESSURE: 150 MMHG | BODY MASS INDEX: 31.15 KG/M2 | HEIGHT: 61 IN

## 2024-03-11 DIAGNOSIS — M17.12 PRIMARY OSTEOARTHRITIS OF LEFT KNEE: Primary | ICD-10-CM

## 2024-03-11 PROCEDURE — 99214 OFFICE O/P EST MOD 30 MIN: CPT | Performed by: STUDENT IN AN ORGANIZED HEALTH CARE EDUCATION/TRAINING PROGRAM

## 2024-03-11 RX ORDER — ACETAMINOPHEN 325 MG/1
975 TABLET ORAL ONCE
OUTPATIENT
Start: 2024-03-11 | End: 2024-03-11

## 2024-03-11 RX ORDER — ASCORBIC ACID 500 MG
500 TABLET ORAL 2 TIMES DAILY
Qty: 60 TABLET | Refills: 1 | Status: SHIPPED | OUTPATIENT
Start: 2024-03-11

## 2024-03-11 RX ORDER — MULTIVIT-MIN/IRON FUM/FOLIC AC 7.5 MG-4
1 TABLET ORAL DAILY
Qty: 30 TABLET | Refills: 1 | Status: SHIPPED | OUTPATIENT
Start: 2024-03-11

## 2024-03-11 RX ORDER — TRANEXAMIC ACID 10 MG/ML
1000 INJECTION, SOLUTION INTRAVENOUS ONCE
OUTPATIENT
Start: 2024-03-11 | End: 2024-03-11

## 2024-03-11 RX ORDER — MELATONIN
2000 DAILY
Qty: 60 TABLET | Refills: 1 | Status: SHIPPED | OUTPATIENT
Start: 2024-03-11

## 2024-03-11 RX ORDER — CEFAZOLIN SODIUM 2 G/50ML
2000 SOLUTION INTRAVENOUS ONCE
OUTPATIENT
Start: 2024-03-11 | End: 2024-03-11

## 2024-03-11 RX ORDER — FOLIC ACID 1 MG/1
1 TABLET ORAL DAILY
Qty: 30 TABLET | Refills: 1 | Status: SHIPPED | OUTPATIENT
Start: 2024-03-11

## 2024-03-11 RX ORDER — SODIUM CHLORIDE, SODIUM LACTATE, POTASSIUM CHLORIDE, CALCIUM CHLORIDE 600; 310; 30; 20 MG/100ML; MG/100ML; MG/100ML; MG/100ML
125 INJECTION, SOLUTION INTRAVENOUS CONTINUOUS
OUTPATIENT
Start: 2024-03-11

## 2024-03-11 RX ORDER — CHLORHEXIDINE GLUCONATE 4 G/100ML
SOLUTION TOPICAL DAILY PRN
OUTPATIENT
Start: 2024-03-11

## 2024-03-11 RX ORDER — CHLORHEXIDINE GLUCONATE ORAL RINSE 1.2 MG/ML
15 SOLUTION DENTAL ONCE
OUTPATIENT
Start: 2024-03-11 | End: 2024-03-11

## 2024-03-11 NOTE — PROGRESS NOTES
Knee New Office Note    Assessment:     1. Primary osteoarthritis of left knee        Plan:    Patient has failed extensive non-operative management of the left knee with HEP, PT, IA CSI and visco. Her pain is limiting her ADLs. She has significant difficulties getting around due the pain and is using a walker. The patient has elected to proceed with Left TKA. Risks and benefits of surgery to include but not limited to bleeding, infection, damage to surrounding structures, hardware failure, instability, fracture, dislocation, need for further surgery, continued pain, stiffness, blood clots, stroke, and heart attack was discussed with the patient. Informed consent was signed today in the office. The patient has met with our surgical schedulers and our preoperative joint replacement pathway has been initiated. All questions were answered. Patient will follow-up 2 weeks post operatively. BMI is appropriate at 31.18 and is a non smoker. She has recently quit smoking and will maintain until surgery and post-op period.        Problem List Items Addressed This Visit          Musculoskeletal and Integument    Primary osteoarthritis of left knee - Primary    Relevant Medications    ascorbic acid (VITAMIN C) 500 MG tablet    folic acid (FOLVITE) 1 mg tablet    Multiple Vitamins-Minerals (multivitamin with minerals) tablet    cholecalciferol (VITAMIN D3) 1,000 units tablet    Other Relevant Orders    Case request operating room: ARTHROPLASTY KNEE TOTAL- possible same day (Completed)    Comprehensive metabolic panel    Hemoglobin A1C W/EAG Estimation    CBC and differential    Anemia Panel w/Reflex    Protime-INR    APTT    Type and screen    Ambulatory referral to Family Practice    Ambulatory referral to Physical Therapy    EKG 12 lead      Subjective:     Patient ID: Neda Anna is a 53 y.o. female.  Chief Complaint:  HPI:  53 y.o. female presents today for initial evaluation bilateral knee pain, left worse than right  referred by her PCP.  She states she has been having ongoing bilateral knee pain for several years now without any specific injury or trauma.  She currently ambulates with the assistance of a walker as her pain is so significant.  She states that her pain is predominantly along the anterior medial aspect of both knees, however her right is worse than her left.  She describes her pain as dull and achy that makes it difficult to perform activities of daily living without pain and discomfort as well as sleeping at night.  She denies any instability.  She denies any mechanical symptoms or catching or locking.  She has previously attempted cortisone injections as well as viscosupplementation without any significant improvement of her symptoms.  She denies any numbness or tingling.  She currently manages her pain with ice and over-the-counter medication.      Allergy:  No Known Allergies  Medications:  all current active meds have been reviewed  Past Medical History:  Past Medical History:   Diagnosis Date    Anxiety     Arthritis     Bipolar 1 disorder (HCC)     Chronic neck and back pain     Class 1 obesity due to excess calories with serious comorbidity and body mass index (BMI) of 32.0 to 32.9 in adult 03/13/2020    Depression     Dizziness     GERD (gastroesophageal reflux disease)     Headache     Headache(784.0)     Hypertension     Joint pain     and swelling    Night sweats     Osteoarthritis     PTSD (post-traumatic stress disorder)      Past Surgical History:  Past Surgical History:   Procedure Laterality Date    FACIAL FRACTURE SURGERY      FRACTURE SURGERY      ORTHOPEDIC SURGERY      TENDON REPAIR       Family History:  Family History   Problem Relation Age of Onset    Diabetes Mother     Hypertension Mother     Thyroid disease Mother     No Known Problems Father     No Known Problems Sister     No Known Problems Daughter     Lung cancer Maternal Grandmother     No Known Problems Maternal Grandfather     No  Known Problems Paternal Grandmother     No Known Problems Paternal Grandfather     No Known Problems Brother     Heart disease Neg Hx     Stroke Neg Hx      Social History:  Social History     Substance and Sexual Activity   Alcohol Use Yes    Alcohol/week: 6.0 - 7.0 standard drinks of alcohol    Types: 3 Glasses of wine, 3 - 4 Cans of beer per week    Comment: social     Social History     Substance and Sexual Activity   Drug Use Yes    Types: Marijuana    Comment: daily     Social History     Tobacco Use   Smoking Status Light Smoker    Current packs/day: 0.25    Average packs/day: 0.3 packs/day for 35.0 years (8.8 ttl pk-yrs)    Types: Cigarettes   Smokeless Tobacco Never           ROS:  General: Per HPI  Skin: Negative, except if noted below  HEENT: Negative  Respiratory: Negative  Cardiovascular: Negative  Gastrointestinal: Negative  Urinary: Negative  Vascular: Negative  Musculoskeletal: Positive per HPI   Neurologic: Positive per HPI  Endocrine: Negative    Objective:  BP Readings from Last 1 Encounters:   03/11/24 150/80      Wt Readings from Last 1 Encounters:   03/11/24 74.8 kg (165 lb)        Respiratory:   non-labored respirations    Lymphatics:  no palpable lymph nodes    Gait:   Steady with walker    Neurologic:   Alert and oriented times 3  Patient with normal sensation except as noted below  Deep tendon reflexes 2+ except as noted in MSK exam    Bilateral Lower Extremity:  Left Knee:      Inspection:  Skin intact    Overall limb alignment Genu varum    Effusion: Mild effusion    ROM 5-110 with pain    Extensor Lag: Able to SLR without lag    Palpation: Medial and lateral Joint line tenderness to palpation    AP Stability at 90 deg Stable    M/L stability in full extension Stable    M/L stability in midflexion Stable    Motor: 5/5 Q/HS/TA/GS/P    Pulses: 2+ DP / 2+ PT    SILT DP/SP/S/S/TN    Right knee:     Inspection:  Skin intact    Overall limb alignment Genu Varum    Effusion: Trace joint  "effusion    ROM 5-110 with pain    Extensor Lag: Able to SLR without lag    Palpation: Medial Joint line tenderness to palpation    AP Stability at 90 deg Stable    M/L stability in full extension Stable    M/L stability in midflexion Stable    Motor: 5/5 Q/HS/TA/GS/P    Pulses: 2+ DP / 2+ PT    SILT DP/SP/S/S/TN    Imaging:  My interpretation XR AP scanogram/AP bilateral knee/lateral/jimenez/sunrise Bilateral knee: severe medial compartment joint space narrowing, subchondral sclerosis, subchondral cysts, osteophyte formation. Bone on bone changes in the medial compartment. No fracture or dislocation.     BMI:   Estimated body mass index is 31.18 kg/m² as calculated from the following:    Height as of this encounter: 5' 1\" (1.549 m).    Weight as of this encounter: 74.8 kg (165 lb).  BSA:   Estimated body surface area is 1.74 meters squared as calculated from the following:    Height as of this encounter: 5' 1\" (1.549 m).    Weight as of this encounter: 74.8 kg (165 lb).           Scribe Attestation      I,:  Vijay Valadez am acting as a scribe while in the presence of the attending physician.:       I,:  Michael Sue, DO personally performed the services described in this documentation    as scribed in my presence.:               "

## 2024-03-12 ENCOUNTER — OFFICE VISIT (OUTPATIENT)
Dept: FAMILY MEDICINE CLINIC | Facility: CLINIC | Age: 54
End: 2024-03-12

## 2024-03-12 VITALS
DIASTOLIC BLOOD PRESSURE: 78 MMHG | WEIGHT: 168.6 LBS | OXYGEN SATURATION: 99 % | RESPIRATION RATE: 18 BRPM | TEMPERATURE: 98.3 F | SYSTOLIC BLOOD PRESSURE: 130 MMHG | BODY MASS INDEX: 31.83 KG/M2 | HEART RATE: 82 BPM | HEIGHT: 61 IN

## 2024-03-12 DIAGNOSIS — Z00.00 ANNUAL PHYSICAL EXAM: ICD-10-CM

## 2024-03-12 DIAGNOSIS — Z12.31 ENCOUNTER FOR SCREENING MAMMOGRAM FOR BREAST CANCER: ICD-10-CM

## 2024-03-12 DIAGNOSIS — J32.4 CHRONIC PANSINUSITIS: ICD-10-CM

## 2024-03-12 DIAGNOSIS — Z12.4 SCREENING FOR CERVICAL CANCER: ICD-10-CM

## 2024-03-12 DIAGNOSIS — Z59.41 FOOD INSECURITY: ICD-10-CM

## 2024-03-12 DIAGNOSIS — J34.2 DEVIATED NASAL SEPTUM: Primary | ICD-10-CM

## 2024-03-12 DIAGNOSIS — Z59.82 INABILITY TO ACQUIRE TRANSPORTATION: ICD-10-CM

## 2024-03-12 DIAGNOSIS — Z23 ENCOUNTER FOR IMMUNIZATION: ICD-10-CM

## 2024-03-12 DIAGNOSIS — Z23 NEED FOR COVID-19 VACCINE: ICD-10-CM

## 2024-03-12 PROCEDURE — 90750 HZV VACC RECOMBINANT IM: CPT | Performed by: FAMILY MEDICINE

## 2024-03-12 PROCEDURE — 90686 IIV4 VACC NO PRSV 0.5 ML IM: CPT | Performed by: FAMILY MEDICINE

## 2024-03-12 PROCEDURE — 90471 IMMUNIZATION ADMIN: CPT | Performed by: FAMILY MEDICINE

## 2024-03-12 PROCEDURE — 99396 PREV VISIT EST AGE 40-64: CPT | Performed by: FAMILY MEDICINE

## 2024-03-12 PROCEDURE — 90472 IMMUNIZATION ADMIN EACH ADD: CPT | Performed by: FAMILY MEDICINE

## 2024-03-12 PROCEDURE — 90677 PCV20 VACCINE IM: CPT | Performed by: FAMILY MEDICINE

## 2024-03-12 PROCEDURE — 90480 ADMN SARSCOV2 VAC 1/ONLY CMP: CPT | Performed by: FAMILY MEDICINE

## 2024-03-12 PROCEDURE — 91320 SARSCV2 VAC 30MCG TRS-SUC IM: CPT | Performed by: FAMILY MEDICINE

## 2024-03-12 SDOH — ECONOMIC STABILITY - FOOD INSECURITY: FOOD INSECURITY: Z59.41

## 2024-03-12 SDOH — ECONOMIC STABILITY - TRANSPORTATION SECURITY: TRANSPORTATION INSECURITY: Z59.82

## 2024-03-12 NOTE — PROGRESS NOTES
ADULT ANNUAL PHYSICAL  [facility]    NAME: Neda Anna  AGE: 53 y.o. SEX: female  : 1970     DATE: 3/12/2024     Assessment and Plan:     Problem List Items Addressed This Visit    None  Visit Diagnoses       Deviated nasal septum    -  Primary    Relevant Orders    Ambulatory Referral to Otolaryngology    Chronic pansinusitis        Relevant Orders    Ambulatory Referral to Otolaryngology    Food insecurity        Relevant Orders    Ambulatory referral to social work care management program    Inability to acquire transportation        Relevant Orders    Ambulatory referral to social work care management program    Annual physical exam        Encounter for screening mammogram for breast cancer        Relevant Orders    Mammo screening bilateral w 3d & cad    Screening for cervical cancer        Relevant Orders    Ambulatory referral to Obstetrics / Gynecology    Encounter for immunization        Relevant Orders    Pneumococcal Conjugate Vaccine 20-valent (Pcv20)    Shingrix 50 mcg/0.5M mL IM given in OFFICE    influenza vaccine, quadrivalent, 0.5 mL, preservative-free, for adult and pediatric patients 6 mos+ (AFLURIA, FLUARIX, FLULAVAL, FLUZONE)    Need for COVID-19 vaccine        Relevant Orders    COVID-19 Pfizer mRNA vaccine 12 yr and older (GRAY cap vial or pre-filled syringe)            Immunizations and preventive care screenings were discussed with patient today. Appropriate education was printed on patient's after visit summary.    Counseling:  Dental Health: discussed importance of regular tooth brushing, flossing, and dental visits.  Sexual health: discussed sexually transmitted diseases, partner selection, use of condoms, avoidance of unintended pregnancy, and contraceptive alternatives.  Exercise: the importance of regular exercise/physical activity was discussed. Recommend exercise 3-5 times per week for at least 30  minutes.          Return in about 3 months (around 2024) for Next scheduled follow up HTN.     Chief Complaint:     No chief complaint on file.     History of Present Illness:     Adult Annual Physical   Patient here for a comprehensive physical exam. The patient reports problems -   .  Patient is requesting updated referral for ENT.  She has a history of deviated septum and chronic sinusitis.  She was previously under the care of Dr. Espinal.  Patient is lost to follow-up due to problems with her insurance in the past.  Diet and Physical Activity  Diet/Nutrition: well balanced diet.   Exercise: no formal exercise.      Depression Screening  PHQ-2/9 Depression Screening    Little interest or pleasure in doing things: 3 - nearly every day  Feeling down, depressed, or hopeless: 1 - several days  Trouble falling or staying asleep, or sleeping too much: 1 - several days  Feeling tired or having little energy: 1 - several days  Poor appetite or overeatin - several days  Feeling bad about yourself - or that you are a failure or have let yourself or your family down: 0 - not at all  Trouble concentrating on things, such as reading the newspaper or watching television: 1 - several days  Moving or speaking so slowly that other people could have noticed. Or the opposite - being so fidgety or restless that you have been moving around a lot more than usual: 0 - not at all  Thoughts that you would be better off dead, or of hurting yourself in some way: 0 - not at all  PHQ-9 Score: 8  PHQ-9 Interpretation: Mild depression       General Health  Sleep: sleeps well.   Hearing: normal - bilateral.  Vision: wears glasses.   Dental: brushes teeth once daily.       /GYN Health  Follows with gynecology? yes   Patient is: postmenopausal  Last menstrual period: years ago  Contraceptive method:  none .    Advanced Care Planning  Do you have an advanced directive? no  Do you have a durable medical power of ? no  ACP document  given to the patient? no     Review of Systems:     Review of Systems   Constitutional:  Negative for chills, diaphoresis, fatigue and fever.   HENT:  Positive for congestion, sinus pressure and sinus pain.    Respiratory:  Negative for cough, shortness of breath and wheezing.    Cardiovascular:  Negative for chest pain, palpitations and leg swelling.   Gastrointestinal:  Negative for abdominal pain, nausea and vomiting.   Genitourinary:  Negative for dysuria, frequency and urgency.   Musculoskeletal:  Positive for arthralgias and back pain.   Skin:  Negative for rash.   Neurological:  Negative for seizures, syncope and weakness.   Psychiatric/Behavioral:  Negative for confusion.       Past Medical History:     Past Medical History:   Diagnosis Date    Anxiety     Arthritis     Bipolar 1 disorder (HCC)     Chronic neck and back pain     Class 1 obesity due to excess calories with serious comorbidity and body mass index (BMI) of 32.0 to 32.9 in adult 03/13/2020    Depression     Dizziness     GERD (gastroesophageal reflux disease)     Headache     Headache(784.0)     Hypertension     Joint pain     and swelling    Night sweats     Osteoarthritis     PTSD (post-traumatic stress disorder)       Past Surgical History:     Past Surgical History:   Procedure Laterality Date    FACIAL FRACTURE SURGERY      FRACTURE SURGERY      ORTHOPEDIC SURGERY      TENDON REPAIR        Social History:     Social History     Socioeconomic History    Marital status: Single     Spouse name: None    Number of children: None    Years of education: None    Highest education level: None   Occupational History    None   Tobacco Use    Smoking status: Light Smoker     Current packs/day: 0.25     Average packs/day: 0.3 packs/day for 35.0 years (8.8 ttl pk-yrs)     Types: Cigarettes    Smokeless tobacco: Never   Vaping Use    Vaping status: Never Used   Substance and Sexual Activity    Alcohol use: Yes     Alcohol/week: 6.0 - 7.0 standard drinks  of alcohol     Types: 3 Glasses of wine, 3 - 4 Cans of beer per week     Comment: social    Drug use: Yes     Types: Marijuana     Comment: daily    Sexual activity: Yes     Partners: Female, Male     Birth control/protection: Post-menopausal, None   Other Topics Concern    None   Social History Narrative    None     Social Determinants of Health     Financial Resource Strain: Low Risk  (3/12/2024)    Overall Financial Resource Strain (CARDIA)     Difficulty of Paying Living Expenses: Not very hard   Food Insecurity: Food Insecurity Present (3/12/2024)    Hunger Vital Sign     Worried About Running Out of Food in the Last Year: Patient declined     Ran Out of Food in the Last Year: Sometimes true   Transportation Needs: Unmet Transportation Needs (3/12/2024)    PRAPARE - Transportation     Lack of Transportation (Medical): Yes     Lack of Transportation (Non-Medical): Yes   Physical Activity: Not on file   Stress: Not on file   Social Connections: Not on file   Intimate Partner Violence: Not on file   Housing Stability: Low Risk  (3/12/2024)    Housing Stability Vital Sign     Unable to Pay for Housing in the Last Year: No     Number of Places Lived in the Last Year: 1     Unstable Housing in the Last Year: No      Family History:     Family History   Problem Relation Age of Onset    Diabetes Mother     Hypertension Mother     Thyroid disease Mother     No Known Problems Father     No Known Problems Sister     No Known Problems Daughter     Lung cancer Maternal Grandmother     No Known Problems Maternal Grandfather     No Known Problems Paternal Grandmother     No Known Problems Paternal Grandfather     No Known Problems Brother     Heart disease Neg Hx     Stroke Neg Hx       Current Medications:     Current Outpatient Medications   Medication Sig Dispense Refill    amLODIPine (NORVASC) 10 mg tablet Take 1 tablet (10 mg total) by mouth daily 90 tablet 1    ascorbic acid (VITAMIN C) 500 MG tablet Take 1 tablet (500  "mg total) by mouth 2 (two) times a day 60 tablet 1    cholecalciferol (VITAMIN D3) 1,000 units tablet Take 2 tablets (2,000 Units total) by mouth daily 60 tablet 1    folic acid (FOLVITE) 1 mg tablet Take 1 tablet (1 mg total) by mouth daily 30 tablet 1    lurasidone (Latuda) 40 mg tablet Take 1 tablet (40 mg total) by mouth daily with dinner 30 tablet 2    Multiple Vitamins-Minerals (multivitamin with minerals) tablet Take 1 tablet by mouth daily 30 tablet 1    omeprazole (PriLOSEC) 40 MG capsule TAKE 1 CAPSULE (40 MG TOTAL) BY MOUTH DAILY. 30 capsule 2    PARoxetine (PAXIL) 10 mg tablet Take 1 tablet (10 mg total) by mouth daily 30 tablet 2    traZODone (DESYREL) 100 mg tablet Take 1 tablet (100 mg total) by mouth daily at bedtime 60 tablet 2     No current facility-administered medications for this visit.      Allergies:     No Known Allergies   Physical Exam:     /78 (BP Location: Right arm, Patient Position: Sitting, Cuff Size: Standard)   Pulse 82   Temp 98.3 °F (36.8 °C) (Temporal)   Resp 18   Ht 5' 1\" (1.549 m)   Wt 76.5 kg (168 lb 9.6 oz)   LMP 05/10/2016   SpO2 99%   BMI 31.86 kg/m²     Physical Exam  Constitutional:       General: She is not in acute distress.     Appearance: Normal appearance. She is well-developed. She is not ill-appearing, toxic-appearing or diaphoretic.   HENT:      Head: Normocephalic and atraumatic.      Right Ear: Tympanic membrane, ear canal and external ear normal. There is no impacted cerumen.      Left Ear: Tympanic membrane, ear canal and external ear normal. There is no impacted cerumen.      Nose: Nasal deformity, septal deviation, congestion and rhinorrhea present.      Mouth/Throat:      Pharynx: No oropharyngeal exudate or posterior oropharyngeal erythema.   Eyes:      General: No scleral icterus.        Right eye: No discharge.         Left eye: No discharge.      Extraocular Movements: Extraocular movements intact.      Pupils: Pupils are equal, round, and " reactive to light.   Cardiovascular:      Rate and Rhythm: Normal rate and regular rhythm.      Heart sounds: Normal heart sounds. No murmur heard.     No friction rub. No gallop.   Pulmonary:      Effort: Pulmonary effort is normal. No respiratory distress.      Breath sounds: No stridor. No wheezing or rhonchi.   Abdominal:      General: Bowel sounds are normal. There is no distension.      Palpations: Abdomen is soft.      Tenderness: There is no abdominal tenderness. There is no guarding.   Musculoskeletal:         General: Normal range of motion.      Cervical back: Normal range of motion.      Right lower leg: No edema.      Left lower leg: No edema.   Skin:     General: Skin is warm.      Capillary Refill: Capillary refill takes less than 2 seconds.   Neurological:      General: No focal deficit present.      Mental Status: She is alert and oriented to person, place, and time.      Cranial Nerves: No cranial nerve deficit.      Motor: No weakness.      Gait: Gait normal.   Psychiatric:         Mood and Affect: Mood normal.          Abran Crocker MD  [facility] AN[facility]

## 2024-03-12 NOTE — PROGRESS NOTES
ADULT ANNUAL PHYSICAL  [facility]    NAME: Neda Anna  AGE: 53 y.o. SEX: female  : 1970     DATE: 3/12/2024     Assessment and Plan:     Problem List Items Addressed This Visit    None  Visit Diagnoses     Food insecurity    -  Primary    Relevant Orders    Ambulatory referral to social work care management program    Inability to acquire transportation        Relevant Orders    Ambulatory referral to social work care management program    Deviated nasal septum        Relevant Orders    Ambulatory Referral to Otolaryngology    Chronic pansinusitis        Relevant Orders    Ambulatory Referral to Otolaryngology    Annual physical exam        Encounter for screening mammogram for breast cancer        Relevant Orders    Mammo screening bilateral w 3d & cad    Screening for cervical cancer        Relevant Orders    Ambulatory referral to Obstetrics / Gynecology    Encounter for immunization        Relevant Orders    Pneumococcal Conjugate Vaccine 20-valent (Pcv20)    Shingrix 50 mcg/0.5M mL IM given in OFFICE    influenza vaccine, quadrivalent, 0.5 mL, preservative-free, for adult and pediatric patients 6 mos+ (AFLURIA, FLUARIX, FLULAVAL, FLUZONE)          Immunizations and preventive care screenings were discussed with patient today. Appropriate education was printed on patient's after visit summary.    Counseling:  {Annual Physical; Counselin}         No follow-ups on file.     Chief Complaint:     No chief complaint on file.     History of Present Illness:     Adult Annual Physical   Patient here for a comprehensive physical exam. The patient reports {problems:84990}.    Diet and Physical Activity  Diet/Nutrition: {annual physical; diet:56675476}.   Exercise: {annual physical; exercise:39525261}.      Depression Screening  PHQ-2/9 Depression Screening    Little interest or pleasure in doing things: 3 - nearly every  day  Feeling down, depressed, or hopeless: 1 - several days  Trouble falling or staying asleep, or sleeping too much: 1 - several days  Feeling tired or having little energy: 1 - several days  Poor appetite or overeatin - several days  Feeling bad about yourself - or that you are a failure or have let yourself or your family down: 0 - not at all  Trouble concentrating on things, such as reading the newspaper or watching television: 1 - several days  Moving or speaking so slowly that other people could have noticed. Or the opposite - being so fidgety or restless that you have been moving around a lot more than usual: 0 - not at all  Thoughts that you would be better off dead, or of hurting yourself in some way: 0 - not at all       General Health  Sleep: {annual physical; sleep:2102}.   Hearing: {annual physical; hearin}.  Vision: {annual physical; vision:}.   Dental: {annual physical; dental:}.       /GYN Health  Follows with gynecology? {YES/NO:}   Patient is: {Menopause:25679}  Last menstrual period: ***  Contraceptive method: {contraceptive options:}.    Advanced Care Planning  Do you have an advanced directive? {YES/NO:}  Do you have a durable medical power of ? {YES/NO:}  ACP document given to the patient? {YES/NO:}     Review of Systems:     Review of Systems   Past Medical History:     Past Medical History:   Diagnosis Date   • Anxiety    • Arthritis    • Bipolar 1 disorder (HCC)    • Chronic neck and back pain    • Class 1 obesity due to excess calories with serious comorbidity and body mass index (BMI) of 32.0 to 32.9 in adult 2020   • Depression    • Dizziness    • GERD (gastroesophageal reflux disease)    • Headache    • Headache(784.0)    • Hypertension    • Joint pain     and swelling   • Night sweats    • Osteoarthritis    • PTSD (post-traumatic stress disorder)       Past Surgical History:     Past Surgical History:   Procedure  Laterality Date   • FACIAL FRACTURE SURGERY     • FRACTURE SURGERY     • ORTHOPEDIC SURGERY     • TENDON REPAIR        Social History:     Social History     Socioeconomic History   • Marital status: Single     Spouse name: None   • Number of children: None   • Years of education: None   • Highest education level: None   Occupational History   • None   Tobacco Use   • Smoking status: Light Smoker     Current packs/day: 0.25     Average packs/day: 0.3 packs/day for 35.0 years (8.8 ttl pk-yrs)     Types: Cigarettes   • Smokeless tobacco: Never   Vaping Use   • Vaping status: Never Used   Substance and Sexual Activity   • Alcohol use: Yes     Alcohol/week: 6.0 - 7.0 standard drinks of alcohol     Types: 3 Glasses of wine, 3 - 4 Cans of beer per week     Comment: social   • Drug use: Yes     Types: Marijuana     Comment: daily   • Sexual activity: Yes     Partners: Female, Male     Birth control/protection: Post-menopausal, None   Other Topics Concern   • None   Social History Narrative   • None     Social Determinants of Health     Financial Resource Strain: Low Risk  (3/12/2024)    Overall Financial Resource Strain (CARDIA)    • Difficulty of Paying Living Expenses: Not very hard   Food Insecurity: Food Insecurity Present (3/12/2024)    Hunger Vital Sign    • Worried About Running Out of Food in the Last Year: Patient declined    • Ran Out of Food in the Last Year: Sometimes true   Transportation Needs: Unmet Transportation Needs (3/12/2024)    PRAPARE - Transportation    • Lack of Transportation (Medical): Yes    • Lack of Transportation (Non-Medical): Yes   Physical Activity: Not on file   Stress: Not on file   Social Connections: Not on file   Intimate Partner Violence: Not on file   Housing Stability: Low Risk  (3/12/2024)    Housing Stability Vital Sign    • Unable to Pay for Housing in the Last Year: No    • Number of Places Lived in the Last Year: 1    • Unstable Housing in the Last Year: No      Family  "History:     Family History   Problem Relation Age of Onset   • Diabetes Mother    • Hypertension Mother    • Thyroid disease Mother    • No Known Problems Father    • No Known Problems Sister    • No Known Problems Daughter    • Lung cancer Maternal Grandmother    • No Known Problems Maternal Grandfather    • No Known Problems Paternal Grandmother    • No Known Problems Paternal Grandfather    • No Known Problems Brother    • Heart disease Neg Hx    • Stroke Neg Hx       Current Medications:     Current Outpatient Medications   Medication Sig Dispense Refill   • amLODIPine (NORVASC) 10 mg tablet Take 1 tablet (10 mg total) by mouth daily 90 tablet 1   • ascorbic acid (VITAMIN C) 500 MG tablet Take 1 tablet (500 mg total) by mouth 2 (two) times a day 60 tablet 1   • cholecalciferol (VITAMIN D3) 1,000 units tablet Take 2 tablets (2,000 Units total) by mouth daily 60 tablet 1   • folic acid (FOLVITE) 1 mg tablet Take 1 tablet (1 mg total) by mouth daily 30 tablet 1   • gabapentin (NEURONTIN) 600 MG tablet Take 600 mg by mouth 3 (three) times a day     • lurasidone (Latuda) 40 mg tablet Take 1 tablet (40 mg total) by mouth daily with dinner 30 tablet 2   • meloxicam (Mobic) 15 mg tablet Take 1 tablet (15 mg total) by mouth daily 30 tablet 2   • Multiple Vitamins-Minerals (multivitamin with minerals) tablet Take 1 tablet by mouth daily 30 tablet 1   • omeprazole (PriLOSEC) 40 MG capsule TAKE 1 CAPSULE (40 MG TOTAL) BY MOUTH DAILY. 30 capsule 2   • PARoxetine (PAXIL) 10 mg tablet Take 1 tablet (10 mg total) by mouth daily 30 tablet 2   • traZODone (DESYREL) 100 mg tablet Take 1 tablet (100 mg total) by mouth daily at bedtime 60 tablet 2     No current facility-administered medications for this visit.      Allergies:     No Known Allergies   Physical Exam:     /78 (BP Location: Right arm, Patient Position: Sitting, Cuff Size: Standard)   Pulse 82   Temp 98.3 °F (36.8 °C) (Temporal)   Resp 18   Ht 5' 1\" (1.549 m) "   Wt 76.5 kg (168 lb 9.6 oz)   LMP 05/10/2016   SpO2 99%   BMI 31.86 kg/m²     Physical Exam     Abran Crocker MD  [facility]

## 2024-04-02 ENCOUNTER — TELEPHONE (OUTPATIENT)
Dept: OBGYN CLINIC | Facility: HOSPITAL | Age: 54
End: 2024-04-02

## 2024-04-10 ENCOUNTER — TELEMEDICINE (OUTPATIENT)
Dept: OBGYN CLINIC | Facility: CLINIC | Age: 54
End: 2024-04-10
Payer: COMMERCIAL

## 2024-04-10 DIAGNOSIS — R63.5 ABNORMAL WEIGHT GAIN: ICD-10-CM

## 2024-04-10 DIAGNOSIS — N95.1 MENOPAUSAL SYMPTOMS: Primary | ICD-10-CM

## 2024-04-10 PROBLEM — Z72.0 TOBACCO USE: Status: RESOLVED | Noted: 2020-03-13 | Resolved: 2024-04-10

## 2024-04-10 PROBLEM — Z12.11 ENCOUNTER FOR SCREENING COLONOSCOPY: Status: RESOLVED | Noted: 2020-05-18 | Resolved: 2024-04-10

## 2024-04-10 PROCEDURE — 99205 OFFICE O/P NEW HI 60 MIN: CPT | Performed by: OBSTETRICS & GYNECOLOGY

## 2024-04-10 RX ORDER — ESTRADIOL 0.05 MG/D
1 PATCH, EXTENDED RELEASE TRANSDERMAL 2 TIMES WEEKLY
Qty: 8 PATCH | Refills: 11 | Status: SHIPPED | OUTPATIENT
Start: 2024-04-11

## 2024-04-10 RX ORDER — PROGESTERONE 100 MG/1
100 CAPSULE ORAL
Qty: 30 CAPSULE | Refills: 11 | Status: SHIPPED | OUTPATIENT
Start: 2024-04-10

## 2024-04-10 NOTE — PROGRESS NOTES
Virtual Regular Visit    Verification of patient location:    Patient is located at Home in the following state in which I hold an active license [place]      Assessment/Plan:    Problem List Items Addressed This Visit    None  Visit Diagnoses       Menopausal symptoms    -  Primary    Relevant Medications    estradiol (Melissa) 0.05 MG/24HR (Start on 4/11/2024)    Progesterone 100 MG CAPS    Abnormal weight gain              1) I discussed the long term health benefits of E2/PG, including: reduction of CVD risk, reduction of hyperlipidemia, reduction of DM and GLP-1 agonist activity with mild appetite suppression; reduction of colon CA, osteoporosis and cognitive decline, Alzheimer's disease.  2) Controversies surrounding estrogen based HRT discussed, including the fear based off the [facility] trials concerning Prempro. I will not be prescribing Prempro. One should not assume all therapies confer the same risk or benefit. Stay tuned to the REPLENISH trials for E2/PG therapy in the [place] with Bijuva, but European trials with same medication point to safety and efficacy with superior health outcomes with women on this type of HRT.  3) The best time to target CVD and atherosclerosis is within the first 5 years of menopause with oral estrogen  therapy. Unfortunately, this protective time frame has been lost, with AMI risk mildly increasing within first year of oral therapy use in both WHI and  trials. This risk is mitigated with transdermal therapy. Oral progesterone still preferred, no CVD or VTE risk. Smoking does not pose a contraindication unlike OCP, which are entirely different medications.   4) She would like desperately to start! Begin with transdermal estradiol patch 0.05 mg, oral  mg nightly. Side effects of HRT reviewed including vaginal bleeding, breast tenderness and somnolence, easily remedied with dose adjustment.   5) I did warn her that Ortho would likely take off meds week prior and after  surgery, but may resume meds once physical therapy has been initiated. Email me with progress report before surgery as to efficacy, and I will review these instructions with her again  6) follow up one year or prn results. May continue Paxil as needed for bipolar issues if helpful per PCP/psych recommendations.    This was a 60 minute visit with greater than 50% of time spent in face to face counseling and coordination of care.         Chief Complaint   Patient presents with    Virtual Regular Visit          Encounter provider Lore Harvey MD    Provider located at OB/GYN [facility] [address] [address] [phone]      Recent Visits  No visits were found meeting these conditions.  Showing recent visits within past 7 days and meeting all other requirements  Today's Visits  Date Type Provider Dept   04/10/24 Telemedicine Lore Harvey MD  Ob/Gyn [facility]   Showing today's visits and meeting all other requirements  Future Appointments  No visits were found meeting these conditions.  Showing future appointments within next 150 days and meeting all other requirements       The patient was identified by name and date of birth. Neda Anna was informed that this is a telemedicine visit and that the visit is being conducted through Telephone.  My office door was closed. No one else was in the room.  She acknowledged consent and understanding of privacy and security of the video platform. The patient has agreed to participate and understands they can discontinue the visit at any time.    Patient is aware this is a billable service.     Subjective        Neda presents for menopause consult, her first visit with me; self referred, has not seen gyn in over 3 years.   She has been menopausal for over 4 years, and has suffered greatly from:  1) Hot flashes and night sweats  2) poor sleep as  above. Takes 50 of Trazedone but still wakes up  3) Weight gain. Can not work out, she is exhausted, also with OA in knees  Offered Paxil for these, which help with Bipolar but not hot flash.  Gyn denies HRT in past as she was a smoker. Quit this past year for impending knee replacement.   PMXH: as above; HTN, bipolar  SHX: previous smoker  FHX: Mom DM2, fibroids, Dad COD poisoned. 2 siblings, one with mild heart issues. 2 children age 39 and 31 healthy       Past Medical History:   Diagnosis Date    Anxiety     Arthritis     Bipolar 1 disorder (HCC)     Chronic neck and back pain     Class 1 obesity due to excess calories with serious comorbidity and body mass index (BMI) of 32.0 to 32.9 in adult 03/13/2020    Depression     Dizziness     GERD (gastroesophageal reflux disease)     Headache     Headache(784.0)     Hypertension     Joint pain     and swelling    Night sweats     Osteoarthritis     PTSD (post-traumatic stress disorder)        Past Surgical History:   Procedure Laterality Date    FACIAL FRACTURE SURGERY      FRACTURE SURGERY      ORTHOPEDIC SURGERY      TENDON REPAIR         Current Outpatient Medications   Medication Sig Dispense Refill    [START ON 4/11/2024] estradiol (Melissa) 0.05 MG/24HR Place 1 patch on the skin 2 (two) times a week 8 patch 11    Progesterone 100 MG CAPS Take 100 mg by mouth at bedtime 30 capsule 11    amLODIPine (NORVASC) 10 mg tablet Take 1 tablet (10 mg total) by mouth daily 90 tablet 1    ascorbic acid (VITAMIN C) 500 MG tablet Take 1 tablet (500 mg total) by mouth 2 (two) times a day 60 tablet 1    cholecalciferol (VITAMIN D3) 1,000 units tablet Take 2 tablets (2,000 Units total) by mouth daily 60 tablet 1    folic acid (FOLVITE) 1 mg tablet Take 1 tablet (1 mg total) by mouth daily 30 tablet 1    lurasidone (Latuda) 40 mg tablet Take 1 tablet (40 mg total) by mouth daily with dinner 30 tablet 2    Multiple Vitamins-Minerals (multivitamin with minerals) tablet Take 1 tablet  by mouth daily 30 tablet 1    omeprazole (PriLOSEC) 40 MG capsule TAKE 1 CAPSULE (40 MG TOTAL) BY MOUTH DAILY. 30 capsule 2    PARoxetine (PAXIL) 10 mg tablet Take 1 tablet (10 mg total) by mouth daily 30 tablet 2    traZODone (DESYREL) 100 mg tablet Take 1 tablet (100 mg total) by mouth daily at bedtime 60 tablet 2     No current facility-administered medications for this visit.        No Known Allergies    Review of Systems   Constitutional:  Positive for fatigue and unexpected weight change.   Gastrointestinal: Negative.    Endocrine: Positive for heat intolerance.   Genitourinary: Negative.    Musculoskeletal:  Positive for arthralgias.        TKA pending May 2024   Neurological: Negative.    Psychiatric/Behavioral:  Positive for dysphoric mood and sleep disturbance.      Video Exam    There were no vitals filed for this visit.    Physical Exam

## 2024-04-15 ENCOUNTER — TELEPHONE (OUTPATIENT)
Dept: FAMILY MEDICINE CLINIC | Facility: CLINIC | Age: 54
End: 2024-04-15

## 2024-04-15 DIAGNOSIS — F51.01 PRIMARY INSOMNIA: ICD-10-CM

## 2024-04-15 DIAGNOSIS — K21.9 GASTROESOPHAGEAL REFLUX DISEASE WITHOUT ESOPHAGITIS: ICD-10-CM

## 2024-04-15 DIAGNOSIS — I10 BENIGN HYPERTENSION: ICD-10-CM

## 2024-04-15 DIAGNOSIS — F31.61 BIPOLAR DISORDER, CURRENT EPISODE MIXED, MILD (HCC): ICD-10-CM

## 2024-04-15 RX ORDER — OMEPRAZOLE 40 MG/1
40 CAPSULE, DELAYED RELEASE ORAL DAILY
Qty: 30 CAPSULE | Refills: 2 | Status: SHIPPED | OUTPATIENT
Start: 2024-04-15

## 2024-04-15 RX ORDER — LURASIDONE HYDROCHLORIDE 40 MG/1
40 TABLET, FILM COATED ORAL
Qty: 30 TABLET | Refills: 0 | Status: SHIPPED | OUTPATIENT
Start: 2024-04-15

## 2024-04-15 RX ORDER — TRAZODONE HYDROCHLORIDE 100 MG/1
100 TABLET ORAL
Qty: 60 TABLET | Refills: 0 | Status: SHIPPED | OUTPATIENT
Start: 2024-04-15

## 2024-04-15 RX ORDER — PAROXETINE 10 MG/1
10 TABLET, FILM COATED ORAL DAILY
Qty: 30 TABLET | Refills: 0 | Status: SHIPPED | OUTPATIENT
Start: 2024-04-15

## 2024-04-15 RX ORDER — AMLODIPINE BESYLATE 10 MG/1
10 TABLET ORAL DAILY
Qty: 90 TABLET | Refills: 1 | Status: SHIPPED | OUTPATIENT
Start: 2024-04-15

## 2024-04-15 NOTE — TELEPHONE ENCOUNTER
Patient called requesting refill for Omeprazole and Norvasc and for them to be sent to the [facility] that is on patient's chart, please advise.

## 2024-04-22 ENCOUNTER — APPOINTMENT (OUTPATIENT)
Dept: LAB | Facility: HOSPITAL | Age: 54
End: 2024-04-22
Payer: COMMERCIAL

## 2024-04-22 DIAGNOSIS — M17.12 PRIMARY OSTEOARTHRITIS OF LEFT KNEE: ICD-10-CM

## 2024-04-22 LAB
ABO GROUP BLD: NORMAL
ALBUMIN SERPL BCP-MCNC: 4.2 G/DL (ref 3.5–5)
ALP SERPL-CCNC: 80 U/L (ref 34–104)
ALT SERPL W P-5'-P-CCNC: 12 U/L (ref 7–52)
ANION GAP SERPL CALCULATED.3IONS-SCNC: 9 MMOL/L (ref 4–13)
ANISOCYTOSIS BLD QL SMEAR: PRESENT
APTT PPP: 29 SECONDS (ref 23–37)
AST SERPL W P-5'-P-CCNC: 13 U/L (ref 13–39)
ATRIAL RATE: 55 BPM
BASOPHILS # BLD MANUAL: 0 THOUSAND/UL (ref 0–0.1)
BASOPHILS NFR MAR MANUAL: 0 % (ref 0–1)
BILIRUB SERPL-MCNC: 0.29 MG/DL (ref 0.2–1)
BLD GP AB SCN SERPL QL: NEGATIVE
BUN SERPL-MCNC: 14 MG/DL (ref 5–25)
CALCIUM SERPL-MCNC: 10 MG/DL (ref 8.4–10.2)
CHLORIDE SERPL-SCNC: 102 MMOL/L (ref 96–108)
CO2 SERPL-SCNC: 26 MMOL/L (ref 21–32)
CREAT SERPL-MCNC: 0.81 MG/DL (ref 0.6–1.3)
EOSINOPHIL # BLD MANUAL: 0.55 THOUSAND/UL (ref 0–0.4)
EOSINOPHIL NFR BLD MANUAL: 7 % (ref 0–6)
ERYTHROCYTE [DISTWIDTH] IN BLOOD BY AUTOMATED COUNT: 15.9 % (ref 11.6–15.1)
EST. AVERAGE GLUCOSE BLD GHB EST-MCNC: 126 MG/DL
GFR SERPL CREATININE-BSD FRML MDRD: 82 ML/MIN/1.73SQ M
GLUCOSE SERPL-MCNC: 79 MG/DL (ref 65–140)
HBA1C MFR BLD: 6 %
HCT VFR BLD AUTO: 41.1 % (ref 34.8–46.1)
HGB BLD-MCNC: 13.2 G/DL (ref 11.5–15.4)
INR PPP: 0.94 (ref 0.84–1.19)
LYMPHOCYTES # BLD AUTO: 4.52 THOUSAND/UL (ref 0.6–4.47)
LYMPHOCYTES # BLD AUTO: 52 % (ref 14–44)
MCH RBC QN AUTO: 28.9 PG (ref 26.8–34.3)
MCHC RBC AUTO-ENTMCNC: 32.1 G/DL (ref 31.4–37.4)
MCV RBC AUTO: 90 FL (ref 82–98)
MONOCYTES # BLD AUTO: 0.47 THOUSAND/UL (ref 0–1.22)
MONOCYTES NFR BLD: 6 % (ref 4–12)
NEUTROPHILS # BLD MANUAL: 2.26 THOUSAND/UL (ref 1.85–7.62)
NEUTS SEG NFR BLD AUTO: 29 % (ref 43–75)
P AXIS: 77 DEGREES
PLATELET # BLD AUTO: 386 THOUSANDS/UL (ref 149–390)
PLATELET BLD QL SMEAR: ADEQUATE
PMV BLD AUTO: 10.6 FL (ref 8.9–12.7)
POTASSIUM SERPL-SCNC: 3.9 MMOL/L (ref 3.5–5.3)
PR INTERVAL: 104 MS
PROT SERPL-MCNC: 7.7 G/DL (ref 6.4–8.4)
PROTHROMBIN TIME: 12.8 SECONDS (ref 11.6–14.5)
QRS AXIS: 65 DEGREES
QRSD INTERVAL: 88 MS
QT INTERVAL: 460 MS
QTC INTERVAL: 440 MS
RBC # BLD AUTO: 4.56 MILLION/UL (ref 3.81–5.12)
RH BLD: POSITIVE
SODIUM SERPL-SCNC: 137 MMOL/L (ref 135–147)
SPECIMEN EXPIRATION DATE: NORMAL
T WAVE AXIS: 48 DEGREES
TARGETS BLD QL SMEAR: PRESENT
VARIANT LYMPHS # BLD AUTO: 6 %
VENTRICULAR RATE: 55 BPM
WBC # BLD AUTO: 7.79 THOUSAND/UL (ref 4.31–10.16)

## 2024-04-22 PROCEDURE — 85610 PROTHROMBIN TIME: CPT

## 2024-04-22 PROCEDURE — 85027 COMPLETE CBC AUTOMATED: CPT

## 2024-04-22 PROCEDURE — 93005 ELECTROCARDIOGRAM TRACING: CPT

## 2024-04-22 PROCEDURE — 85007 BL SMEAR W/DIFF WBC COUNT: CPT

## 2024-04-22 PROCEDURE — 85730 THROMBOPLASTIN TIME PARTIAL: CPT

## 2024-04-22 PROCEDURE — 86901 BLOOD TYPING SEROLOGIC RH(D): CPT

## 2024-04-22 PROCEDURE — 83036 HEMOGLOBIN GLYCOSYLATED A1C: CPT

## 2024-04-22 PROCEDURE — 86850 RBC ANTIBODY SCREEN: CPT

## 2024-04-22 PROCEDURE — 36415 COLL VENOUS BLD VENIPUNCTURE: CPT

## 2024-04-22 PROCEDURE — 93010 ELECTROCARDIOGRAM REPORT: CPT | Performed by: STUDENT IN AN ORGANIZED HEALTH CARE EDUCATION/TRAINING PROGRAM

## 2024-04-22 PROCEDURE — 86900 BLOOD TYPING SEROLOGIC ABO: CPT

## 2024-04-22 PROCEDURE — 80053 COMPREHEN METABOLIC PANEL: CPT

## 2024-04-23 ENCOUNTER — TELEPHONE (OUTPATIENT)
Dept: OBGYN CLINIC | Facility: MEDICAL CENTER | Age: 54
End: 2024-04-23

## 2024-04-23 NOTE — TELEPHONE ENCOUNTER
Patient is scheduled for her pre-op appointment on April 25th. The provider will be out of the office that day. I called and left a voicemail stating that we will need to reschedule that appointment. I left the number for the scheduling line for the patient to give us a call back.

## 2024-05-03 ENCOUNTER — TELEPHONE (OUTPATIENT)
Age: 54
End: 2024-05-03

## 2024-05-03 NOTE — TELEPHONE ENCOUNTER
Spoke with the patient. Let her know that since she has not responded to the nurses and did not attend her pre-op clearance her surgery was canceled. She stated she forgot about the nursing calls. Rescheduled her surgery to 8/5.

## 2024-05-06 DIAGNOSIS — I10 BENIGN HYPERTENSION: ICD-10-CM

## 2024-05-06 DIAGNOSIS — F31.61 BIPOLAR DISORDER, CURRENT EPISODE MIXED, MILD (HCC): ICD-10-CM

## 2024-05-06 DIAGNOSIS — K21.9 GASTROESOPHAGEAL REFLUX DISEASE WITHOUT ESOPHAGITIS: ICD-10-CM

## 2024-05-06 DIAGNOSIS — N95.1 MENOPAUSAL SYMPTOMS: ICD-10-CM

## 2024-05-06 DIAGNOSIS — F51.01 PRIMARY INSOMNIA: ICD-10-CM

## 2024-05-06 DIAGNOSIS — M51.36 LUMBAR DEGENERATIVE DISC DISEASE: Primary | ICD-10-CM

## 2024-05-06 RX ORDER — TRAZODONE HYDROCHLORIDE 100 MG/1
100 TABLET ORAL
Qty: 60 TABLET | Refills: 0 | Status: SHIPPED | OUTPATIENT
Start: 2024-05-06

## 2024-05-06 RX ORDER — MULTIVITAMIN WITH FOLIC ACID 400 MCG
TABLET ORAL
Qty: 90 TABLET | Refills: 1 | Status: SHIPPED | OUTPATIENT
Start: 2024-05-06

## 2024-05-06 RX ORDER — AMLODIPINE BESYLATE 10 MG/1
10 TABLET ORAL DAILY
Qty: 90 TABLET | Refills: 0 | Status: SHIPPED | OUTPATIENT
Start: 2024-05-06

## 2024-05-06 RX ORDER — LURASIDONE HYDROCHLORIDE 40 MG/1
40 TABLET, FILM COATED ORAL
Qty: 30 TABLET | Refills: 0 | Status: SHIPPED | OUTPATIENT
Start: 2024-05-06

## 2024-05-06 RX ORDER — PROGESTERONE 100 MG/1
100 CAPSULE ORAL
Qty: 30 CAPSULE | Refills: 11 | Status: SHIPPED | OUTPATIENT
Start: 2024-05-06

## 2024-05-06 RX ORDER — OMEPRAZOLE 40 MG/1
40 CAPSULE, DELAYED RELEASE ORAL DAILY
Qty: 30 CAPSULE | Refills: 0 | Status: SHIPPED | OUTPATIENT
Start: 2024-05-06

## 2024-05-06 RX ORDER — PAROXETINE 10 MG/1
10 TABLET, FILM COATED ORAL DAILY
Qty: 30 TABLET | Refills: 0 | Status: SHIPPED | OUTPATIENT
Start: 2024-05-06

## 2024-05-06 RX ORDER — ESTRADIOL 0.04 MG/D
1 FILM, EXTENDED RELEASE TRANSDERMAL 2 TIMES WEEKLY
Qty: 8 PATCH | Refills: 11 | Status: SHIPPED | OUTPATIENT
Start: 2024-05-06

## 2024-05-06 RX ORDER — METHOCARBAMOL 750 MG/1
750 TABLET, FILM COATED ORAL EVERY 12 HOURS PRN
Qty: 30 TABLET | Refills: 0 | Status: SHIPPED | OUTPATIENT
Start: 2024-05-06

## 2024-05-07 RX ORDER — PROGESTERONE 100 MG/1
100 CAPSULE ORAL
Qty: 30 CAPSULE | Refills: 0 | OUTPATIENT
Start: 2024-05-07

## 2024-05-07 RX ORDER — ESTRADIOL 0.05 MG/D
1 PATCH, EXTENDED RELEASE TRANSDERMAL 2 TIMES WEEKLY
Qty: 8 PATCH | Refills: 0 | OUTPATIENT
Start: 2024-05-09

## 2024-05-20 ENCOUNTER — EVALUATION (OUTPATIENT)
Dept: PHYSICAL THERAPY | Facility: MEDICAL CENTER | Age: 54
End: 2024-05-20
Payer: COMMERCIAL

## 2024-05-20 ENCOUNTER — PATIENT OUTREACH (OUTPATIENT)
Dept: FAMILY MEDICINE CLINIC | Facility: CLINIC | Age: 54
End: 2024-05-20

## 2024-05-20 DIAGNOSIS — M17.12 PRIMARY OSTEOARTHRITIS OF LEFT KNEE: Primary | ICD-10-CM

## 2024-05-20 PROCEDURE — 97161 PT EVAL LOW COMPLEX 20 MIN: CPT | Performed by: PHYSICAL THERAPIST

## 2024-05-20 NOTE — PROGRESS NOTES
PT Evaluation     Today's date: 2024  Patient name: Neda Anna  : 1970  MRN: [id number]  Referring provider: Venessa Guzmán PA-C  Dx:   Encounter Diagnosis     ICD-10-CM    1. Primary osteoarthritis of left knee  M17.12 Ambulatory referral to Physical Therapy                     Assessment  Impairments: abnormal muscle firing, abnormal or restricted ROM, impaired physical strength and pain with function    Assessment details: Neda Anna is a 53 y.o. female presents with Primary osteoarthritis of left knee and LBP. Neda Anna has the below listed impairments and will benefit from skilled PT to improve deficits to return to prior level of function.   Understanding of Dx/Px/POC: good     Prognosis: good    Goals  Impairment Goals  - Decrease pain to 0-3/10  - Improve ROM equal to WFL  - Increase strength equal to 5/5    Functional Goals  - Patient will be independent with comprehensive HEP  - Ambulation is improved to prior level of function  - Stair climbing is improved to prior level of function  - Squatting is improved to prior level of function     Plan  Patient would benefit from: skilled PT  Referral necessary: No    Planned therapy interventions: home exercise program, manual therapy, neuromuscular re-education, patient education, functional ROM exercises, strengthening, stretching and joint mobilization    Frequency: 1x week  Duration in weeks: 12  Treatment plan discussed with: patient        Subjective Evaluation    History of Present Illness  Mechanism of injury: Neda presents with L knee pain secondary to OA. She is scheduled for L TKR on 24. She also reports she has had chronic LBP. She is currently limited with standing, walking, stairs, squatting, cooking, sitting, household chores, and lifting/carrying objects.   Patient Goals  Patient goals for therapy: decreased pain    Pain  At worst pain rating: 10          Objective     Active Range of Motion     Lumbar   Flexion:   with pain Restriction level: moderate  Extension:  with pain Restriction level: moderate  Left lateral flexion:  Restriction level: minimal  Right lateral flexion:  Restriction level: minimal  Left rotation:  Restriction level: minimal  Right rotation:  Restriction level: minimal  Left Knee   Flexion: 110 degrees   Extension: -15 degrees     Right Knee   Flexion: 125 degrees     Strength/Myotome Testing     Left Hip   Planes of Motion   Flexion: 3+  Extension: 4-  Abduction: 3+  Adduction: 4-    Right Hip   Planes of Motion   Flexion: 3+  Extension: 4-  Abduction: 3+  Adduction: 4-    Left Knee   Flexion: 3+  Extension: 3    Right Knee   Flexion: 3+  Extension: 3    Tests     Lumbar     Left   Negative passive SLR.     Right   Negative passive SLR.     Left Hip   Negative ADITHYA and long sit.     Right Hip   Negative ADITHYA and long sit.              Precautions: none      Manuals 5/20                                                                Neuro Re-Ed                                                                                                        Ther Ex             LTR             Seated lumbar flex             Seated HS stretch L             Seated gastroc stretch L             Quad sets L             SAQ L             LAQ L             march             Stand hip abd L             Ther Activity                                       Gait Training                                       Modalities

## 2024-05-20 NOTE — PROGRESS NOTES
ROXY received a new referral on 03/12/2024 in regard to pt identified as having concerns about transportation and food. [facility] was able to reach pt and introduce self and role. Pt and SWCM discussed transportation. Per pt she currently has 60 day [place] approved while she completes application process. Pt has to go for an in person evaluation. Pt reports not needing any additional assistance in regard to same. SWCM and pt then discussed food insecurity. Pt does have SNAP benefits, but feels she needs additional resources. [facility] provided pt with food bank resources over the phone and emailed them also. Pt was appreciative of same. Pt shares she has SS income and section 8 housing. Pt has no needs or additional concerns at this time. Pt was advised to contact [facility] if any needs arise, [facility] will remain available.

## 2024-05-29 ENCOUNTER — APPOINTMENT (OUTPATIENT)
Dept: PHYSICAL THERAPY | Facility: MEDICAL CENTER | Age: 54
End: 2024-05-29
Payer: COMMERCIAL

## 2024-05-31 DIAGNOSIS — M51.36 LUMBAR DEGENERATIVE DISC DISEASE: ICD-10-CM

## 2024-05-31 DIAGNOSIS — K21.9 GASTROESOPHAGEAL REFLUX DISEASE WITHOUT ESOPHAGITIS: ICD-10-CM

## 2024-05-31 DIAGNOSIS — N95.1 MENOPAUSAL SYMPTOMS: Primary | ICD-10-CM

## 2024-05-31 DIAGNOSIS — I10 BENIGN HYPERTENSION: ICD-10-CM

## 2024-05-31 RX ORDER — PROGESTERONE 200 MG/1
200 CAPSULE ORAL
Qty: 30 CAPSULE | Refills: 11 | Status: SHIPPED | OUTPATIENT
Start: 2024-05-31

## 2024-06-03 RX ORDER — AMLODIPINE BESYLATE 10 MG/1
10 TABLET ORAL DAILY
Qty: 90 TABLET | Refills: 0 | Status: SHIPPED | OUTPATIENT
Start: 2024-06-03

## 2024-06-03 RX ORDER — MULTIVITAMIN WITH FOLIC ACID 400 MCG
TABLET ORAL
Qty: 90 TABLET | Refills: 0 | Status: SHIPPED | OUTPATIENT
Start: 2024-06-03

## 2024-06-03 RX ORDER — OMEPRAZOLE 40 MG/1
40 CAPSULE, DELAYED RELEASE ORAL DAILY
Qty: 30 CAPSULE | Refills: 0 | Status: SHIPPED | OUTPATIENT
Start: 2024-06-03

## 2024-06-03 RX ORDER — METHOCARBAMOL 750 MG/1
750 TABLET, FILM COATED ORAL EVERY 12 HOURS PRN
Qty: 30 TABLET | Refills: 0 | Status: SHIPPED | OUTPATIENT
Start: 2024-06-03

## 2024-06-28 ENCOUNTER — TELEPHONE (OUTPATIENT)
Dept: OBGYN CLINIC | Facility: HOSPITAL | Age: 54
End: 2024-06-28

## 2024-06-29 DIAGNOSIS — F51.01 PRIMARY INSOMNIA: ICD-10-CM

## 2024-07-01 RX ORDER — TRAZODONE HYDROCHLORIDE 100 MG/1
100 TABLET ORAL
Qty: 60 TABLET | Refills: 0 | Status: SHIPPED | OUTPATIENT
Start: 2024-07-01

## 2024-07-09 ENCOUNTER — TELEPHONE (OUTPATIENT)
Dept: OBGYN CLINIC | Facility: HOSPITAL | Age: 54
End: 2024-07-09

## 2024-07-09 ENCOUNTER — VBI (OUTPATIENT)
Dept: ADMINISTRATIVE | Facility: OTHER | Age: 54
End: 2024-07-09

## 2024-07-09 DIAGNOSIS — M17.12 PRIMARY OSTEOARTHRITIS OF LEFT KNEE: ICD-10-CM

## 2024-07-09 RX ORDER — MULTIVIT-MIN/IRON FUM/FOLIC AC 7.5 MG-4
1 TABLET ORAL DAILY
Qty: 30 TABLET | Refills: 1 | Status: SHIPPED | OUTPATIENT
Start: 2024-07-09

## 2024-07-09 RX ORDER — FOLIC ACID 1 MG/1
1 TABLET ORAL DAILY
Qty: 30 TABLET | Refills: 1 | Status: SHIPPED | OUTPATIENT
Start: 2024-07-09

## 2024-07-09 RX ORDER — ASCORBIC ACID 500 MG
500 TABLET ORAL 2 TIMES DAILY
Qty: 60 TABLET | Refills: 1 | Status: SHIPPED | OUTPATIENT
Start: 2024-07-09

## 2024-07-09 RX ORDER — CHOLECALCIFEROL (VITAMIN D3) 25 MCG
2000 TABLET ORAL DAILY
Qty: 60 TABLET | Refills: 1 | Status: SHIPPED | OUTPATIENT
Start: 2024-07-09

## 2024-07-09 NOTE — TELEPHONE ENCOUNTER
Spoke with the patient, let her know new orders were placed and she can start taking the vitamins when she receives them.     She does need new lab orders placed as well.

## 2024-07-09 NOTE — TELEPHONE ENCOUNTER
Caller: Patient    Doctor: Vikram    Reason for call: Patient had her surgery rescheduled for 8/5/24 and is questioning the medications that she should be taking. She had them filled before the last surgery was changed and some of them she can't refill so she thinks new ones might need to be put in. Please call patient to discuss. Thank you.    Call back#: [phone]

## 2024-07-09 NOTE — TELEPHONE ENCOUNTER
07/09/24 2:45 PM     Chart reviewed for Pap Smear (HPV) aka Cervical Cancer Screening ; nothing is submitted to the patient's insurance at this time.     Regine Terrazas   [facility]

## 2024-07-11 ENCOUNTER — ANNUAL EXAM (OUTPATIENT)
Dept: OBGYN CLINIC | Facility: CLINIC | Age: 54
End: 2024-07-11

## 2024-07-11 ENCOUNTER — APPOINTMENT (OUTPATIENT)
Dept: LAB | Facility: CLINIC | Age: 54
End: 2024-07-11
Payer: COMMERCIAL

## 2024-07-11 VITALS
WEIGHT: 167.6 LBS | DIASTOLIC BLOOD PRESSURE: 72 MMHG | HEIGHT: 61 IN | BODY MASS INDEX: 31.64 KG/M2 | HEART RATE: 76 BPM | SYSTOLIC BLOOD PRESSURE: 125 MMHG

## 2024-07-11 DIAGNOSIS — Z11.3 SCREEN FOR STD (SEXUALLY TRANSMITTED DISEASE): ICD-10-CM

## 2024-07-11 DIAGNOSIS — Z01.419 ENCOUNTER FOR ANNUAL ROUTINE GYNECOLOGICAL EXAMINATION: Primary | ICD-10-CM

## 2024-07-11 LAB
HIV 1+2 AB+HIV1 P24 AG SERPL QL IA: NORMAL
HIV 2 AB SERPL QL IA: NORMAL
HIV1 AB SERPL QL IA: NORMAL
HIV1 P24 AG SERPL QL IA: NORMAL

## 2024-07-11 PROCEDURE — 36415 COLL VENOUS BLD VENIPUNCTURE: CPT

## 2024-07-11 PROCEDURE — 86780 TREPONEMA PALLIDUM: CPT

## 2024-07-11 PROCEDURE — 87340 HEPATITIS B SURFACE AG IA: CPT | Performed by: NURSE PRACTITIONER

## 2024-07-11 PROCEDURE — 87389 HIV-1 AG W/HIV-1&-2 AB AG IA: CPT

## 2024-07-11 PROCEDURE — 86592 SYPHILIS TEST NON-TREP QUAL: CPT

## 2024-07-11 PROCEDURE — 99396 PREV VISIT EST AGE 40-64: CPT | Performed by: NURSE PRACTITIONER

## 2024-07-11 PROCEDURE — 87522 HEPATITIS C REVRS TRNSCRPJ: CPT

## 2024-07-11 PROCEDURE — 87491 CHLMYD TRACH DNA AMP PROBE: CPT | Performed by: NURSE PRACTITIONER

## 2024-07-11 PROCEDURE — 87591 N.GONORRHOEAE DNA AMP PROB: CPT | Performed by: NURSE PRACTITIONER

## 2024-07-11 PROCEDURE — G0145 SCR C/V CYTO,THINLAYER,RESCR: HCPCS | Performed by: NURSE PRACTITIONER

## 2024-07-11 PROCEDURE — G0476 HPV COMBO ASSAY CA SCREEN: HCPCS | Performed by: NURSE PRACTITIONER

## 2024-07-11 NOTE — PATIENT INSTRUCTIONS
PAP and STD results can take up to 2 weeks  Remember safe sex and condom use  Keep mammogram appointment  Call with needs or concerns  Return in 1 year

## 2024-07-11 NOTE — PROGRESS NOTES
"  Assessment/Plan     Diagnoses and all orders for this visit:    Encounter for annual routine gynecological examination    Screen for STD (sexually transmitted disease)  -     Chlamydia/GC amplified DNA by PCR  -     RPR-Syphilis Screening (Total Syphilis IGG/IGM); Future  -     HIV 1/2 AB/AG w Reflex [facility] for 2 yr old and above; Future  -     Hepatitis C RNA, quantitative, PCR; Future  -     Hepatitis B surface antigen         Plan  Patient Instructions   PAP and STD results can take up to 2 weeks  Remember safe sex and condom use  Keep mammogram appointment  Call with needs or concerns  Return in 1 year  Return in about 1 year (around 2025) for Annual GYN exam.  Pt verbalized understanding of all discussed.      Kim Anna is a 54 y.o. female who presents for annual exam. The patient has no complaints today. The patient is not sexually active. GYN screening history: last pap: approximate date 2016 and negative HPV and was normal and last mammogram: approximate date 2022 and was normal. The patient is  taking hormone replacement therapy.Ordered By Dr. Pagan.  Patient denies post-menopausal vaginal bleeding.. The patient participates in regular exercise: no. Is going to have a double knee replacement and back \"problems\" patient reports that there is not domestic violence in her life. The patient  Had menopausal symptoms and now is taking hormonal  therapy  having menopausal  symptoms hot flashes, insomnia, night sweats, hair loss, now all is WNL    Colorectal screening is due 2025    Depression Screening Follow-up Plan: Patient's depression screening was negative with a PHQ-2 score of 2. Their PHQ-9 score was 6. Clinically patient does not have depression. No treatment is required.          Menstrual History:  OB History          2    Para   2    Term   2            AB        Living             SAB        IAB        Ectopic        Multiple        Live " "Births                    Menarche age: 7  Patient's last menstrual period was 05/10/2016.       The following portions of the patient's history were reviewed and updated as appropriate: allergies, current medications, past family history, past medical history, past social history, past surgical history, and problem list.    Review of Systems  Pertinent items are noted in HPI.     Objective      /72 (BP Location: Left arm, Patient Position: Sitting, Cuff Size: Standard)   Pulse 76   Ht 5' 1\" (1.549 m)   Wt 76 kg (167 lb 9.6 oz)   LMP 05/10/2016   BMI 31.67 kg/m²      General:   alert and oriented, in no acute distress, alert, appears stated age, and cooperative   Heart: NSR   Lungs: clear to auscultation bilaterally, WNL respiratory effort, negative cough or SOB   Thyroid: Negative masses palpable   Abdomen: soft, non-tender, without masses or organomegaly   Vulva: normal   Vagina: normal mucosa   Cervix: no bleeding following Pap, no cervical motion tenderness, and no lesions   Uterus: normal size, non-tender, normal shape and consistency   Adnexa: normal adnexa   Breasts: NT,  negative masses palpable, negative discharge or dimpling         Lab Review  PAP with HPV testing collected        "

## 2024-07-12 PROBLEM — A53.0 POSITIVE RPR TEST: Status: ACTIVE | Noted: 2024-07-12

## 2024-07-12 LAB
HBV SURFACE AG SER QL: NORMAL
HPV HR 12 DNA CVX QL NAA+PROBE: NEGATIVE
HPV16 DNA CVX QL NAA+PROBE: NEGATIVE
HPV18 DNA CVX QL NAA+PROBE: NEGATIVE
RPR SER QL: NORMAL
TREPONEMA PALLIDUM IGG+IGM AB [PRESENCE] IN SERUM OR PLASMA BY IMMUNOASSAY: REACTIVE

## 2024-07-13 LAB
HCV RNA SERPL NAA+PROBE-ACNC: NORMAL IU/ML
TEST INFORMATION: NORMAL

## 2024-07-15 LAB
C TRACH DNA SPEC QL NAA+PROBE: NEGATIVE
N GONORRHOEA DNA SPEC QL NAA+PROBE: NEGATIVE

## 2024-07-16 LAB — T PALLIDUM IGG+IGM SER QL: POSITIVE

## 2024-07-17 ENCOUNTER — HOSPITAL ENCOUNTER (OUTPATIENT)
Dept: MAMMOGRAPHY | Facility: CLINIC | Age: 54
Discharge: HOME | End: 2024-07-17
Payer: COMMERCIAL

## 2024-07-17 ENCOUNTER — TELEPHONE (OUTPATIENT)
Dept: OBGYN CLINIC | Facility: CLINIC | Age: 54
End: 2024-07-17

## 2024-07-17 ENCOUNTER — TELEPHONE (OUTPATIENT)
Age: 54
End: 2024-07-17

## 2024-07-17 ENCOUNTER — APPOINTMENT (OUTPATIENT)
Dept: LAB | Facility: CLINIC | Age: 54
End: 2024-07-17
Payer: COMMERCIAL

## 2024-07-17 VITALS — HEIGHT: 61 IN | BODY MASS INDEX: 31.53 KG/M2 | WEIGHT: 167 LBS

## 2024-07-17 DIAGNOSIS — M17.12 PRIMARY OSTEOARTHRITIS OF LEFT KNEE: ICD-10-CM

## 2024-07-17 DIAGNOSIS — Z12.31 ENCOUNTER FOR SCREENING MAMMOGRAM FOR BREAST CANCER: ICD-10-CM

## 2024-07-17 DIAGNOSIS — A52.8 LATE LATENT SYPHILIS: Primary | ICD-10-CM

## 2024-07-17 LAB
ABO GROUP BLD: NORMAL
ALBUMIN SERPL BCG-MCNC: 4 G/DL (ref 3.5–5)
ALP SERPL-CCNC: 94 U/L (ref 34–104)
ALT SERPL W P-5'-P-CCNC: 14 U/L (ref 7–52)
ANION GAP SERPL CALCULATED.3IONS-SCNC: 12 MMOL/L (ref 4–13)
APTT PPP: 28 SECONDS (ref 23–37)
AST SERPL W P-5'-P-CCNC: 13 U/L (ref 13–39)
BASOPHILS # BLD AUTO: 0.09 THOUSANDS/ÂΜL (ref 0–0.1)
BASOPHILS NFR BLD AUTO: 1 % (ref 0–1)
BILIRUB SERPL-MCNC: 0.18 MG/DL (ref 0.2–1)
BLD GP AB SCN SERPL QL: NEGATIVE
BUN SERPL-MCNC: 17 MG/DL (ref 5–25)
CALCIUM SERPL-MCNC: 9.7 MG/DL (ref 8.4–10.2)
CHLORIDE SERPL-SCNC: 110 MMOL/L (ref 96–108)
CO2 SERPL-SCNC: 21 MMOL/L (ref 21–32)
CREAT SERPL-MCNC: 0.98 MG/DL (ref 0.6–1.3)
EOSINOPHIL # BLD AUTO: 0.29 THOUSAND/ÂΜL (ref 0–0.61)
EOSINOPHIL NFR BLD AUTO: 4 % (ref 0–6)
ERYTHROCYTE [DISTWIDTH] IN BLOOD BY AUTOMATED COUNT: 14.7 % (ref 11.6–15.1)
EST. AVERAGE GLUCOSE BLD GHB EST-MCNC: 131 MG/DL
GFR SERPL CREATININE-BSD FRML MDRD: 64 ML/MIN/1.73SQ M
GLUCOSE P FAST SERPL-MCNC: 108 MG/DL (ref 65–99)
HBA1C MFR BLD: 6.2 %
HCT VFR BLD AUTO: 41.1 % (ref 34.8–46.1)
HGB BLD-MCNC: 13.5 G/DL (ref 11.5–15.4)
IMM GRANULOCYTES # BLD AUTO: 0.01 THOUSAND/UL (ref 0–0.2)
IMM GRANULOCYTES NFR BLD AUTO: 0 % (ref 0–2)
INR PPP: 0.92 (ref 0.84–1.19)
LYMPHOCYTES # BLD AUTO: 4.05 THOUSANDS/ÂΜL (ref 0.6–4.47)
LYMPHOCYTES NFR BLD AUTO: 54 % (ref 14–44)
MCH RBC QN AUTO: 29.5 PG (ref 26.8–34.3)
MCHC RBC AUTO-ENTMCNC: 32.8 G/DL (ref 31.4–37.4)
MCV RBC AUTO: 90 FL (ref 82–98)
MONOCYTES # BLD AUTO: 0.39 THOUSAND/ÂΜL (ref 0.17–1.22)
MONOCYTES NFR BLD AUTO: 5 % (ref 4–12)
NEUTROPHILS # BLD AUTO: 2.73 THOUSANDS/ÂΜL (ref 1.85–7.62)
NEUTS SEG NFR BLD AUTO: 36 % (ref 43–75)
NRBC BLD AUTO-RTO: 0 /100 WBCS
PLATELET # BLD AUTO: 407 THOUSANDS/UL (ref 149–390)
PMV BLD AUTO: 10.9 FL (ref 8.9–12.7)
POTASSIUM SERPL-SCNC: 3.5 MMOL/L (ref 3.5–5.3)
PROT SERPL-MCNC: 7.5 G/DL (ref 6.4–8.4)
PROTHROMBIN TIME: 12.3 SECONDS (ref 11.6–14.5)
RBC # BLD AUTO: 4.57 MILLION/UL (ref 3.81–5.12)
RH BLD: POSITIVE
SODIUM SERPL-SCNC: 143 MMOL/L (ref 135–147)
SPECIMEN EXPIRATION DATE: NORMAL
WBC # BLD AUTO: 7.56 THOUSAND/UL (ref 4.31–10.16)

## 2024-07-17 PROCEDURE — 80053 COMPREHEN METABOLIC PANEL: CPT

## 2024-07-17 PROCEDURE — 83036 HEMOGLOBIN GLYCOSYLATED A1C: CPT

## 2024-07-17 PROCEDURE — 85025 COMPLETE CBC W/AUTO DIFF WBC: CPT

## 2024-07-17 PROCEDURE — 86850 RBC ANTIBODY SCREEN: CPT

## 2024-07-17 PROCEDURE — 86900 BLOOD TYPING SEROLOGIC ABO: CPT

## 2024-07-17 PROCEDURE — 86901 BLOOD TYPING SEROLOGIC RH(D): CPT

## 2024-07-17 PROCEDURE — 85730 THROMBOPLASTIN TIME PARTIAL: CPT

## 2024-07-17 PROCEDURE — 85610 PROTHROMBIN TIME: CPT

## 2024-07-17 PROCEDURE — 36415 COLL VENOUS BLD VENIPUNCTURE: CPT

## 2024-07-17 PROCEDURE — 77067 SCR MAMMO BI INCL CAD: CPT

## 2024-07-17 PROCEDURE — 77063 BREAST TOMOSYNTHESIS BI: CPT

## 2024-07-17 NOTE — TELEPHONE ENCOUNTER
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 We discussed treatment with Penicillin G 2.4M U IM weekly for 3 weeks. The medication was sent to her pharmacy and she will pick it up and come to the office for injection.

## 2024-07-17 NOTE — TELEPHONE ENCOUNTER
Pt called to get Sleep Consult appt details. Pt was getting confused with her other appts.    Provided ALL appt details that are in her chart.     Pt wrote all appts down, dates, times, address and what the appts were for.

## 2024-07-18 ENCOUNTER — TELEPHONE (OUTPATIENT)
Age: 54
End: 2024-07-18

## 2024-07-18 LAB
LAB AP GYN PRIMARY INTERPRETATION: NORMAL
Lab: NORMAL

## 2024-07-18 NOTE — TELEPHONE ENCOUNTER
Caller: Patient    Doctor: Dr. Sue     Reason for call: Patient calling stating that she had her blood work completed but she did not fast for the blood work.  Patient asking if a new order can be placed to have the blood work redone.  Patient asking for call back when order is placed.     Call back#:

## 2024-07-24 ENCOUNTER — TELEPHONE (OUTPATIENT)
Age: 54
End: 2024-07-24

## 2024-07-24 NOTE — TELEPHONE ENCOUNTER
Caller: patient    Doctor: Kirk    Reason for call: Patient would like to reschedule her appointment that she had scheduled for 7/24 at 2:30 fir 7/31 at 1:00pm     Call back#:[phone]

## 2024-07-24 NOTE — PROGRESS NOTES
Internal Medicine Pre-Operative Evaluation:     Reason for Visit: Pre-operative Evaluation for Risk Stratification and Optimization    Patient ID: Neda Anna is a 54 y.o. female.     Surgery: Arthroplasty of left knee  Referring Provider: Dr Sue      Recommendations to Proceed withSurgery    Patient is considered to be Low risk for Medium risk procedure.     After evaluation and discussion with patient with emphasis that all surgery has some degree of inherent risk it is acknowledged by patient this risk is Acceptable.    Patient is optimized and may proceed with planned procedure.     Assessment    Pre-operative Medical Evaluation for planned surgery  Recommendations as listed in PLAN section below  Contact surgical nurse  navigator with any questions regarding preoperative plan or schedule.      Problem List Items Addressed This Visit          Cardiovascular and Mediastinum    Benign hypertension     Stable  Monitor post operative BP   Avoid hypotension if at all possible  Refer to PAT instructions regarding medication administration the morning of surgery              Musculoskeletal and Integument    Primary osteoarthritis of left knee     Failed outpatient conservative measures  Electing to undergo arthroplasty              Behavioral Health    Bipolar disorder (HCC)     Take medications as prescribed            Other    Obesity (BMI 30.0-34.9)     Recommend ongoing attempts at weight loss  Current BMI meets criteria of <40 per [facility] preoperative qualifications            Other Visit Diagnoses       Preoperative clearance    -  Primary        IFG  Hgb A1c 6.2  Recommend following DM diet  Monitor FBS           Plan:     1. Further preoperative workup as follows:   - none no further testing required may proceed with surgery    2. Preoperative Medication Management Review performed by PAT nursing  YES    3. Patient requires further consultation with:   No Consults Required    4. Discharge Planning /  "Barriers to Discharge  none identified - patients has post discharge therapy plan in place, transportation arranged for discharge day, adequate family support at home to assist with discharge to home.        Subjective:           History of Present Illness:     Neda Anna is a 54 y.o. female who presents to the office today for a preoperative consultation at the request of surgeon. The patient understands this is an elective procedure and not emergent. They are electing to undergo planned procedure with an understanding that all surgery has inherent risk. They have worked with their surgeon and failed conservative treatment measures. Today they present for preoperative risk assessment and recommendations for optimization in preparation for surgery.    Pt seen in surgical optimization center for upcoming proposed surgery. They have failed previous conservative measures and have elected surgical intervention.     Pt meets presurgical lab and BMI optimization goals.    Upon interview questioning patient is able to perform greater than 4 METs workload in daily life without any reported cardio-pulmonary symptoms.          ROS: No TIA's or unusual headaches, no dysphagia.  No prolonged cough. No dyspnea or chest pain on exertion.  No abdominal pain, change in bowel habits, black or bloody stools.  No urinary tract or BPH symptoms.  Positive reported pain in arthritic joint. Positive difficulty with gait. No skin rashes or issues.      Objective:    /76   Pulse 67   Ht 5' 1\" (1.549 m)   Wt 76.2 kg (168 lb)   LMP 05/10/2016   BMI 31.74 kg/m²       General Appearance: no distress, conversive  HEENT: PERRLA, conjuctiva normal; oropharynx clear; mucous membranes moist;   Neck:  Supple, no lymphadenopathy or thyromegaly  Lungs: breath sounds normal, normal respiratory effort, no retractions, expiratory effort normal  CV: normal heart sounds S1/S2, PMI normal   ABD: soft non tender, no masses , no hepatic or " splenomegaly  EXT: DP pulses intact, no lymphadenopathy, no edema  Skin: normal turgor, normal texture, no rash  Psych: affect normal, mood normal  Neuro: AAOx3        The following portions of the patient's history were reviewed and updated as appropriate: allergies, current medications, past family history, past medical history, past social history, past surgical history and problem list.     Past History:       Past Medical History:   Diagnosis Date    Anxiety     Arthritis     Bipolar 1 disorder (HCC)     Chronic neck and back pain     Class 1 obesity due to excess calories with serious comorbidity and body mass index (BMI) of 32.0 to 32.9 in adult 2020    Depression     Dizziness     GERD (gastroesophageal reflux disease)     Headache     Headache(784.0)     History of transfusion     Hypertension     Joint pain     and swelling    Night sweats     Osteoarthritis     PTSD (post-traumatic stress disorder)     Seizures (HCC)     Past Surgical History:   Procedure Laterality Date    COLONOSCOPY      FACIAL FRACTURE SURGERY      FRACTURE SURGERY      KNEE ARTHROSCOPY W/ MENISCECTOMY Left     ORTHOPEDIC SURGERY      TENDON REPAIR            Social History     Tobacco Use    Smoking status: Former     Current packs/day: 0.00     Average packs/day: 0.3 packs/day for 35.0 years (8.8 ttl pk-yrs)     Types: Cigarettes     Quit date: 2024     Years since quittin.2     Passive exposure: Past    Smokeless tobacco: Never   Vaping Use    Vaping status: Never Used   Substance Use Topics    Alcohol use: Yes     Alcohol/week: 6.0 - 7.0 standard drinks of alcohol     Types: 3 Glasses of wine, 3 - 4 Cans of beer per week     Comment: social    Drug use: Yes     Types: Marijuana     Comment: daily     Family History   Problem Relation Age of Onset    Diabetes Mother     Hypertension Mother     Thyroid disease Mother     No Known Problems Father     No Known Problems Sister     No Known Problems Daughter     Lung  "cancer Maternal Grandmother     No Known Problems Maternal Grandfather     No Known Problems Paternal Grandmother     No Known Problems Paternal Grandfather     No Known Problems Brother     Heart disease Neg Hx     Stroke Neg Hx     Breast cancer Neg Hx           Allergies:     No Known Allergies     Current Medications:     Current Outpatient Medications   Medication Instructions    amLODIPine (NORVASC) 10 mg, Oral, Daily    ascorbic acid (VITAMIN C) 500 mg, Oral, 2 times daily    cholecalciferol (VITAMIN D3) 2,000 Units, Oral, Daily    estradiol (Melissa) 0.0375 MG/24HR 1 patch, Transdermal, 2 times weekly    folic acid (FOLVITE) 1 mg, Oral, Daily    lurasidone (LATUDA) 40 mg, Oral, Daily with dinner    methocarbamol (ROBAXIN) 750 mg, Oral, Every 12 hours PRN    Multiple Vitamin (Daily-Omer) TABS Take I tablet once daily by mouth    Multiple Vitamins-Minerals (multivitamin with minerals) tablet 1 tablet, Oral, Daily    omeprazole (PRILOSEC) 40 mg, Oral, Daily    PARoxetine (PAXIL) 10 mg, Oral, Daily    Penicillin G Benzathine 9297688 units SUSR 1 each, Intramuscular, Weekly    Progesterone 200 mg, Oral, SL MYCHART Nightly    traZODone (DESYREL) 100 mg, Oral, Daily at bedtime    triamcinolone (KENALOG) 0.5 % ointment Topical, 2 times daily           PRE-OP WORKSHEET DATA    Assessment of Pre-Operative Risks     MLJ Quality Hard Stops:    BMI (<40) : Estimated body mass index is 31.74 kg/m² as calculated from the following:    Height as of this encounter: 5' 1\" (1.549 m).    Weight as of this encounter: 76.2 kg (168 lb).    Hgb ( >11):   Lab Results   Component Value Date    HGB 13.5 07/17/2024    HGB 13.2 04/22/2024    HGB 12.7 01/19/2022       HbA1c (<7.5) :   Lab Results   Component Value Date    HGBA1C 6.2 (H) 07/17/2024       GFR (>60) (Less then 45 = Nephrology consult):    Lab Results   Component Value Date    EGFR 64 07/17/2024    EGFR 82 04/22/2024    EGFR 60 01/19/2022         Active Decompensated Chronic " Conditions which would delay surgery  No acutely decompensated medical issues such as recent CVA, MI, new onset arrhythmia, severe aortic stenosis, CHF, uncontrolled COPD       Functional capacity: CLIMBING 2 FLIGHTS STAIRS, GARDENING                             4 METS  Pick the highest level patient can comfortably perform   (if less the 4 mets consider functional assessment via cardiac stress testing or consultation)    Assessment of intra and post operative respiratory, hemodynamic and thrombotic risks     Prior Anesthesia Reactions: No     Personal history of venous thromboembolic disease? No    History of steroid use > 5 mg for >2 weeks within last year? No      The patient's risk factors for cardiac complications include :  none    Neda Anna has an IN HOSPITAL cardiac risk of RCI RISK CLASS I (0 risk factors, risk of major cardiac compl. appr. 0.5%) based on RCRI calculator    Cardiac Risk Estimation: per the Revised Cardiac Risk Index (Circ. 100:1043, 1999),        Pre-Op Data Reviewed:       Laboratory Results: I have personally reviewed the pertinent laboratory results/reports     EKG:I have personally reviewed pertinent reports.  . I personally reviewed and interpreted available tracings in the electronic medical record    Encounter Date: 04/22/24   EKG 12 lead   Result Value    Ventricular Rate 55    Atrial Rate 55    SD Interval 104    QRSD Interval 88    QT Interval 460    QTC Interval 440    P Axis 77    QRS Axis 65    T Wave Axis 48    Narrative    Sinus bradycardia with short SD  Possible Left atrial enlargement  Borderline ECG  When compared with ECG of 11-MAY-2016 20:29,  No significant change was found  Confirmed by Vince Colon ([id number]) on 4/22/2024 11:38:38 AM       OLD RECORDS: reviewed old records in the chart review section if EHR on day of visit.    Previous cardiopulmonary studies within the past year:  Echocardiogram: no   Cardiac Catheterization: no  Stress Test: no      Time of  visit including pre-visit chart review, visit and post-visit coordination of plan and care , review of pre-surgical lab work, preparation and time spent documenting note in electronic medical record, time spent face-to-face in physical examination answering patient questions by care team 35 minutes             [facility]

## 2024-07-30 ENCOUNTER — OFFICE VISIT (OUTPATIENT)
Dept: FAMILY MEDICINE CLINIC | Facility: CLINIC | Age: 54
End: 2024-07-30

## 2024-07-30 VITALS
HEIGHT: 61 IN | DIASTOLIC BLOOD PRESSURE: 83 MMHG | BODY MASS INDEX: 31.72 KG/M2 | OXYGEN SATURATION: 95 % | WEIGHT: 168 LBS | TEMPERATURE: 97.5 F | SYSTOLIC BLOOD PRESSURE: 121 MMHG | HEART RATE: 81 BPM | RESPIRATION RATE: 16 BRPM

## 2024-07-30 DIAGNOSIS — K21.9 GASTROESOPHAGEAL REFLUX DISEASE WITHOUT ESOPHAGITIS: ICD-10-CM

## 2024-07-30 DIAGNOSIS — F31.61 BIPOLAR DISORDER, CURRENT EPISODE MIXED, MILD (HCC): ICD-10-CM

## 2024-07-30 DIAGNOSIS — M51.36 LUMBAR DEGENERATIVE DISC DISEASE: ICD-10-CM

## 2024-07-30 DIAGNOSIS — Z86.19 HISTORY OF SYPHILIS: ICD-10-CM

## 2024-07-30 DIAGNOSIS — R21 RASH AND NONSPECIFIC SKIN ERUPTION: ICD-10-CM

## 2024-07-30 DIAGNOSIS — I10 BENIGN HYPERTENSION: Primary | ICD-10-CM

## 2024-07-30 PROCEDURE — 96372 THER/PROPH/DIAG INJ SC/IM: CPT | Performed by: FAMILY MEDICINE

## 2024-07-30 PROCEDURE — 3079F DIAST BP 80-89 MM HG: CPT | Performed by: FAMILY MEDICINE

## 2024-07-30 PROCEDURE — 99214 OFFICE O/P EST MOD 30 MIN: CPT | Performed by: FAMILY MEDICINE

## 2024-07-30 PROCEDURE — 3074F SYST BP LT 130 MM HG: CPT | Performed by: FAMILY MEDICINE

## 2024-07-30 RX ORDER — METHOCARBAMOL 750 MG/1
750 TABLET, FILM COATED ORAL EVERY 12 HOURS PRN
Qty: 30 TABLET | Refills: 1 | Status: SHIPPED | OUTPATIENT
Start: 2024-07-30

## 2024-07-30 RX ORDER — LURASIDONE HYDROCHLORIDE 40 MG/1
40 TABLET, FILM COATED ORAL
Qty: 30 TABLET | Refills: 2 | Status: SHIPPED | OUTPATIENT
Start: 2024-07-30

## 2024-07-30 RX ORDER — PAROXETINE 10 MG/1
10 TABLET, FILM COATED ORAL DAILY
Qty: 30 TABLET | Refills: 2 | Status: SHIPPED | OUTPATIENT
Start: 2024-07-30

## 2024-07-30 RX ORDER — OMEPRAZOLE 40 MG/1
40 CAPSULE, DELAYED RELEASE ORAL DAILY
Qty: 30 CAPSULE | Refills: 0 | Status: SHIPPED | OUTPATIENT
Start: 2024-07-30

## 2024-07-30 RX ORDER — TRIAMCINOLONE ACETONIDE 5 MG/G
OINTMENT TOPICAL 2 TIMES DAILY
Qty: 15 G | Refills: 1 | Status: SHIPPED | OUTPATIENT
Start: 2024-07-30 | End: 2024-08-13

## 2024-07-30 RX ORDER — AMLODIPINE BESYLATE 10 MG/1
10 TABLET ORAL DAILY
Qty: 90 TABLET | Refills: 1 | Status: SHIPPED | OUTPATIENT
Start: 2024-07-30

## 2024-07-30 NOTE — ASSESSMENT & PLAN NOTE
Patient was diagnosed and treated for syphilis 30 years ago  Recent labs show positive T. pallidum antibody and negative RPR.  Patient is asymptomatic.  Her labs are more consistent with prior syphilis infection.  Antibiotics not indicated however patient is adamant that she wants antibiotics.  Patient was administered intramuscular penicillin injection that was recently Rx'd by GYN  Recommend follow-up at [facility] for syphilis monitoring

## 2024-07-30 NOTE — PROGRESS NOTES
Ambulatory Visit  Name: Neda Anna      : 1970      MRN: [id number]  Encounter Provider: Abran Crocker MD  Encounter Date: 2024   Encounter department: [facility]    Assessment & Plan   1. Benign hypertension  Assessment & Plan:  Well-controlled  Blood pressure goal less than 140/90  Continue amlodipine    Orders:  -     amLODIPine (NORVASC) 10 mg tablet; Take 1 tablet (10 mg total) by mouth daily  2. Rash and nonspecific skin eruption  -     Ambulatory Referral to Dermatology; Future  -     triamcinolone (KENALOG) 0.5 % ointment; Apply topically 2 (two) times a day for 14 days  3. Bipolar disorder, current episode mixed, mild (HCC)  Assessment & Plan:  Stable  Continue Latuda and Paxil    Orders:  -     lurasidone (Latuda) 40 mg tablet; Take 1 tablet (40 mg total) by mouth daily with dinner  -     PARoxetine (PAXIL) 10 mg tablet; Take 1 tablet (10 mg total) by mouth daily  4. Gastroesophageal reflux disease without esophagitis  -     omeprazole (PriLOSEC) 40 MG capsule; Take 1 capsule (40 mg total) by mouth daily  5. Lumbar degenerative disc disease  -     methocarbamol (ROBAXIN) 750 mg tablet; Take 1 tablet (750 mg total) by mouth every 12 (twelve) hours as needed for muscle spasms  6. History of syphilis  Assessment & Plan:  Patient was diagnosed and treated for syphilis 30 years ago  Recent labs show positive T. pallidum antibody and negative RPR.  Patient is asymptomatic.  Her labs are more consistent with prior syphilis infection.  Antibiotics not indicated however patient is adamant that she wants antibiotics.  Patient was administered intramuscular penicillin injection that was recently Rx'd by GYN  Recommend follow-up at local [facility] for syphilis monitoring  Orders:  -     Penicillin G Benzathine (BICILLIN L-A) intramuscular injection 2.4 Million Units       History of Present Illness     54-year-old female with a history of  "hypertension, bipolar disorder, and GERD who presents today for follow-up.  Patient reports that she has been struggling with pruritic rash in her bilateral forearm for many months now.  She reports that the rash has gotten worse and scaly and dark.  She has used various over-the-counter creams and ointments with no improvement in symptoms.            Review of Systems   Constitutional:  Negative for chills, diaphoresis, fatigue and fever.   HENT:  Negative for congestion and sinus pressure.    Eyes:  Negative for visual disturbance.   Respiratory:  Negative for cough, shortness of breath and wheezing.    Cardiovascular:  Negative for chest pain, palpitations and leg swelling.   Gastrointestinal:  Negative for abdominal pain, diarrhea, nausea and vomiting.   Genitourinary:  Negative for dysuria, frequency, genital sores, urgency and vaginal discharge.   Musculoskeletal:  Positive for arthralgias and back pain.   Skin:  Negative for rash.   Neurological:  Negative for seizures, syncope, weakness and headaches.   Psychiatric/Behavioral:  Negative for confusion, dysphoric mood, self-injury, sleep disturbance and suicidal ideas. The patient is not hyperactive.        Objective     /83 (BP Location: Left arm, Patient Position: Sitting, Cuff Size: Large)   Pulse 81   Temp 97.5 °F (36.4 °C) (Temporal)   Resp 16   Ht 5' 1\" (1.549 m)   Wt 76.2 kg (168 lb)   LMP 05/10/2016   SpO2 95%   BMI 31.74 kg/m²     Physical Exam  Constitutional:       General: She is not in acute distress.     Appearance: Normal appearance. She is well-developed. She is not ill-appearing, toxic-appearing or diaphoretic.   HENT:      Head: Normocephalic and atraumatic.      Right Ear: Tympanic membrane, ear canal and external ear normal. There is no impacted cerumen.      Left Ear: Tympanic membrane, ear canal and external ear normal. There is no impacted cerumen.      Mouth/Throat:      Pharynx: No oropharyngeal exudate or posterior " oropharyngeal erythema.   Eyes:      General: No scleral icterus.        Right eye: No discharge.         Left eye: No discharge.      Extraocular Movements: Extraocular movements intact.      Pupils: Pupils are equal, round, and reactive to light.   Cardiovascular:      Rate and Rhythm: Normal rate and regular rhythm.      Heart sounds: Normal heart sounds. No murmur heard.     No friction rub. No gallop.   Pulmonary:      Effort: Pulmonary effort is normal. No respiratory distress.      Breath sounds: No stridor. No wheezing or rhonchi.   Abdominal:      General: Bowel sounds are normal. There is no distension.      Palpations: Abdomen is soft.      Tenderness: There is no abdominal tenderness. There is no guarding.   Musculoskeletal:         General: Normal range of motion.      Cervical back: Normal range of motion.      Right lower leg: No edema.      Left lower leg: No edema.   Skin:     General: Skin is warm.      Capillary Refill: Capillary refill takes less than 2 seconds.             Comments: Diffuse area of scaly, maculopapular rash, with severe hyperpigmentation involving bilateral forearms.   Neurological:      General: No focal deficit present.      Mental Status: She is alert and oriented to person, place, and time.      Cranial Nerves: No cranial nerve deficit.      Motor: No weakness.      Gait: Gait normal.   Psychiatric:         Mood and Affect: Mood normal.       Administrative Statements

## 2024-07-31 ENCOUNTER — OFFICE VISIT (OUTPATIENT)
Age: 54
End: 2024-07-31
Payer: COMMERCIAL

## 2024-07-31 ENCOUNTER — TELEPHONE (OUTPATIENT)
Dept: FAMILY MEDICINE CLINIC | Facility: CLINIC | Age: 54
End: 2024-07-31

## 2024-07-31 VITALS
BODY MASS INDEX: 31.72 KG/M2 | DIASTOLIC BLOOD PRESSURE: 76 MMHG | SYSTOLIC BLOOD PRESSURE: 129 MMHG | HEART RATE: 67 BPM | HEIGHT: 61 IN | WEIGHT: 168 LBS

## 2024-07-31 DIAGNOSIS — M17.12 PRIMARY OSTEOARTHRITIS OF LEFT KNEE: ICD-10-CM

## 2024-07-31 PROCEDURE — 99215 OFFICE O/P EST HI 40 MIN: CPT | Performed by: INTERNAL MEDICINE

## 2024-07-31 NOTE — PATIENT INSTRUCTIONS
BEFORE SURGERY    Contact surgical nurse  navigator with any questions regarding preoperative plan or schedule.  Stop all over the counter supplements, herbal, naturopathic  medications for 1 week prior to surgery UNLESS prescribed by your surgeon  Hold NSAIDS (i.e. advil, alleve, motrin, ibuprofen, celebrex) minimum 5 days prior to surgery  Follow presurgical medication instructions provided by preadmission nursing team reviewed during your presurgery phone call  Strategies for optimizing your surgery through breathing exercises, nutrition and physical activity can be found at [email or web address]  Call [phone] with any presurgical concerns or medications questions    AFTER SURGERY    Recommend using Tylenol ( acetaminophen ) 1000 mg every eight hours during the first week post discharge along with icing the area for 20 mins every 3-4 hours while awake can be helpful in reducing your need for post operative opioid use. This opioid sparing plan can be used along side your surgeons pain plan.  Use stool softener over the counter (colace) daily after surgery during the first 1-2 weeks to avoid post operative constipation issues  If no bowel movement within 3 days after surgery then use over the counter Miralax in addition to your stool softener   If cleared by your surgical team for activity then early and often walking is encouraged and can be important in prevention of post surgical blood clots. Additionally spend as much time out of bed as possible and allowed by your surgical team  Use your incentive spirometer twice per hour in the first seven days after surgery to help prevent post surgery lung complications and infections  Call [phone] with any post discharge concerns or medical issues

## 2024-07-31 NOTE — TELEPHONE ENCOUNTER
Appt for August 6th at 2:45pm    [facility]   [address] [place], [place] [id number] [phone]

## 2024-08-04 PROBLEM — F12.90 MARIJUANA USE: Status: ACTIVE | Noted: 2024-08-04

## 2024-08-05 ENCOUNTER — TELEPHONE (OUTPATIENT)
Dept: OTHER | Facility: OTHER | Age: 54
End: 2024-08-05

## 2024-08-05 NOTE — TELEPHONE ENCOUNTER
Patient called in stating she needs to Cancel her surgery for today with Dr. Sue due to not feeling well. Pt would like a call back to reschedule her surgery.

## 2024-08-08 ENCOUNTER — TELEPHONE (OUTPATIENT)
Age: 54
End: 2024-08-08

## 2024-08-08 ENCOUNTER — CLINICAL SUPPORT (OUTPATIENT)
Dept: OBGYN CLINIC | Facility: CLINIC | Age: 54
End: 2024-08-08

## 2024-08-08 VITALS
DIASTOLIC BLOOD PRESSURE: 84 MMHG | HEIGHT: 61 IN | SYSTOLIC BLOOD PRESSURE: 141 MMHG | WEIGHT: 169.6 LBS | BODY MASS INDEX: 32.02 KG/M2 | HEART RATE: 73 BPM

## 2024-08-08 DIAGNOSIS — A53.9 SYPHILIS: Primary | ICD-10-CM

## 2024-08-08 PROCEDURE — 96372 THER/PROPH/DIAG INJ SC/IM: CPT

## 2024-08-08 NOTE — TELEPHONE ENCOUNTER
Caller: Patient    Doctor: Vikram    Reason for call: Had to cx her surgery on Monday due to being sick, would like to r/s     Call back#: [phone]

## 2024-08-08 NOTE — PROGRESS NOTES
Patient given penicillin injection in left buttocks.     NDC# 24351-888-22  LOT# [id number]  EXP# 4/30/25

## 2024-08-17 DIAGNOSIS — A52.8 LATE LATENT SYPHILIS: ICD-10-CM

## 2024-08-23 ENCOUNTER — TELEPHONE (OUTPATIENT)
Dept: OBGYN CLINIC | Facility: CLINIC | Age: 54
End: 2024-08-23

## 2024-09-10 ENCOUNTER — PREP FOR PROCEDURE (OUTPATIENT)
Age: 54
End: 2024-09-10

## 2024-09-10 ENCOUNTER — OFFICE VISIT (OUTPATIENT)
Age: 54
End: 2024-09-10
Payer: COMMERCIAL

## 2024-09-10 VITALS
SYSTOLIC BLOOD PRESSURE: 134 MMHG | DIASTOLIC BLOOD PRESSURE: 86 MMHG | BODY MASS INDEX: 32.55 KG/M2 | HEIGHT: 61 IN | WEIGHT: 172.4 LBS

## 2024-09-10 DIAGNOSIS — M25.562 PAIN IN BOTH KNEES, UNSPECIFIED CHRONICITY: ICD-10-CM

## 2024-09-10 DIAGNOSIS — M17.0 BILATERAL PRIMARY OSTEOARTHRITIS OF KNEE: ICD-10-CM

## 2024-09-10 DIAGNOSIS — M25.561 PAIN IN BOTH KNEES, UNSPECIFIED CHRONICITY: ICD-10-CM

## 2024-09-10 DIAGNOSIS — M17.12 PRIMARY OSTEOARTHRITIS OF LEFT KNEE: Primary | ICD-10-CM

## 2024-09-10 PROCEDURE — 99214 OFFICE O/P EST MOD 30 MIN: CPT | Performed by: STUDENT IN AN ORGANIZED HEALTH CARE EDUCATION/TRAINING PROGRAM

## 2024-09-10 RX ORDER — ASCORBIC ACID 500 MG
500 TABLET ORAL 2 TIMES DAILY
Qty: 60 TABLET | Refills: 1 | Status: SHIPPED | OUTPATIENT
Start: 2024-09-10

## 2024-09-10 RX ORDER — FOLIC ACID 1 MG/1
1 TABLET ORAL DAILY
Qty: 30 TABLET | Refills: 1 | Status: SHIPPED | OUTPATIENT
Start: 2024-09-10

## 2024-09-10 RX ORDER — SODIUM CHLORIDE, SODIUM LACTATE, POTASSIUM CHLORIDE, CALCIUM CHLORIDE 600; 310; 30; 20 MG/100ML; MG/100ML; MG/100ML; MG/100ML
125 INJECTION, SOLUTION INTRAVENOUS CONTINUOUS
OUTPATIENT
Start: 2024-09-10

## 2024-09-10 RX ORDER — CHLORHEXIDINE GLUCONATE 40 MG/ML
SOLUTION TOPICAL DAILY PRN
OUTPATIENT
Start: 2024-09-10

## 2024-09-10 RX ORDER — CHLORHEXIDINE GLUCONATE ORAL RINSE 1.2 MG/ML
15 SOLUTION DENTAL ONCE
OUTPATIENT
Start: 2024-09-10 | End: 2024-09-10

## 2024-09-10 RX ORDER — ACETAMINOPHEN 325 MG/1
975 TABLET ORAL ONCE
OUTPATIENT
Start: 2024-09-10 | End: 2024-09-10

## 2024-09-10 RX ORDER — MULTIVIT-MIN/IRON FUM/FOLIC AC 7.5 MG-4
1 TABLET ORAL DAILY
Qty: 30 TABLET | Refills: 1 | Status: SHIPPED | OUTPATIENT
Start: 2024-09-10

## 2024-09-10 NOTE — PROGRESS NOTES
Knee New Office Note    Assessment:     1. Primary osteoarthritis of left knee    2. Pain in both knees, unspecified chronicity    3. Bilateral primary osteoarthritis of knee          Plan:  Patient has failed extensive non-operative management of the left knee with HEP, PT, IA CSI and visco. Her pain is limiting her ADLs. She has significant difficulties getting around due the pain and is using a walker. The patient has elected to proceed again with left TKA. Risks and benefits of surgery to include but not limited to bleeding, infection, damage to surrounding structures, hardware failure, instability, fracture, dislocation, need for further surgery, continued pain, stiffness, blood clots, stroke, and heart attack was discussed with the patient. Informed consent was signed today in the office. The patient has met with our surgical schedulers and our preoperative joint replacement pathway has been initiated. All questions were answered. Patient will follow-up 2 weeks post operatively. BMI is appropriate at 32.57 and is a nonsmoker.         Problem List Items Addressed This Visit          Musculoskeletal and Integument    Primary osteoarthritis of left knee - Primary     Other Visit Diagnoses       Pain in both knees, unspecified chronicity        Bilateral primary osteoarthritis of knee                 Subjective:     Patient ID: Neda Anna is a 54 y.o. female.  Chief Complaint:    Today's visit (9/10/2024):  Patient returns today for evaluation of bilateral knee pain.  She was scheuled for left total knee arthroplasty although she was sick the day of surgery and had to cancel.  She feels better and is now here for clinic visit for preop assessment prior to scheduling left total knee arthroplasty.  She denies any changes since her last visit in terms of symptoms.  She continues to use a walker for ambulation due to significant pain.  Left knee continues to bother her more than the right knee.  The pain is dull,  achy, and makes it difficult to perform her activities of daily living.  No instability, no numbness and no tingling.  She currently manages her pain with ice and over-the-counter medication.  She has previously tried physical therapy, corticosteroid injections and viscosupplementation injections without any symptomatic relief.    Initial visit on 3/11/2024:  54 y.o. female presents today for initial evaluation bilateral knee pain, left worse than right referred by her PCP.  She states she has been having ongoing bilateral knee pain for several years now without any specific injury or trauma.  She currently ambulates with the assistance of a walker as her pain is so significant.  She states that her pain is predominantly along the anterior medial aspect of both knees, however her right is worse than her left.  She describes her pain as dull and achy that makes it difficult to perform activities of daily living without pain and discomfort as well as sleeping at night.  She denies any instability.  She denies any mechanical symptoms or catching or locking.  She has previously attempted cortisone injections as well as viscosupplementation without any significant improvement of her symptoms.  She denies any numbness or tingling.  She currently manages her pain with ice and over-the-counter medication.      Allergy:  No Known Allergies  Medications:  all current active meds have been reviewed  Past Medical History:  Past Medical History:   Diagnosis Date    Anxiety     Arthritis     Bipolar 1 disorder (HCC)     Chronic neck and back pain     Class 1 obesity due to excess calories with serious comorbidity and body mass index (BMI) of 32.0 to 32.9 in adult 03/13/2020    Depression     Dizziness     GERD (gastroesophageal reflux disease)     Headache     Headache(784.0)     History of transfusion     Hypertension     Joint pain     and swelling    Night sweats     Osteoarthritis     PTSD (post-traumatic stress disorder)      Seizures (HCC)      Past Surgical History:  Past Surgical History:   Procedure Laterality Date    COLONOSCOPY      FACIAL FRACTURE SURGERY      FRACTURE SURGERY      KNEE ARTHROSCOPY W/ MENISCECTOMY Left     ORTHOPEDIC SURGERY      TENDON REPAIR       Family History:  Family History   Problem Relation Age of Onset    Diabetes Mother     Hypertension Mother     Thyroid disease Mother     No Known Problems Father     No Known Problems Sister     No Known Problems Daughter     Lung cancer Maternal Grandmother     No Known Problems Maternal Grandfather     No Known Problems Paternal Grandmother     No Known Problems Paternal Grandfather     No Known Problems Brother     Heart disease Neg Hx     Stroke Neg Hx     Breast cancer Neg Hx      Social History:  Social History     Substance and Sexual Activity   Alcohol Use Yes    Alcohol/week: 6.0 - 7.0 standard drinks of alcohol    Types: 3 Glasses of wine, 3 - 4 Cans of beer per week    Comment: social     Social History     Substance and Sexual Activity   Drug Use Yes    Types: Marijuana    Comment: daily     Social History     Tobacco Use   Smoking Status Former    Current packs/day: 0.00    Average packs/day: 0.3 packs/day for 35.0 years (8.8 ttl pk-yrs)    Types: Cigarettes    Quit date: 2024    Years since quittin.3    Passive exposure: Past   Smokeless Tobacco Never           ROS:  General: Per HPI  Skin: Negative, except if noted below  HEENT: Negative  Respiratory: Negative  Cardiovascular: Negative  Gastrointestinal: Negative  Urinary: Negative  Vascular: Negative  Musculoskeletal: Positive per HPI   Neurologic: Positive per HPI  Endocrine: Negative    Objective:  BP Readings from Last 1 Encounters:   09/10/24 134/86      Wt Readings from Last 1 Encounters:   09/10/24 78.2 kg (172 lb 6.4 oz)        Bilateral lower extremity:  Left Knee:      Inspection: Skin intact    Overall limb alignment varus    Effusion: Mild effusion    ROM 5-110 with pain    Extensor  "Lag: None    Palpation: Medial and lateral joint line tenderness to palpation    AP Stability at 90 deg stable    M/L stability in full extension stable    M/L stability in midflexion stable    Motor: 5/5 IP/Q/HS/TA/GS    Pulses: 2+ DP / 2+ PT    SILT DP/SP/S/S/TN    Right knee:     Inspection: Skin intact    Overall limb alignment varus    Effusion: Trace    ROM 5-110 with pain    Extensor Lag: None    Palpation: Medial joint line tenderness to palpation    AP Stability at 90 deg stable    M/L stability in full extension stable    M/L stability in midflexion stable    Motor: 5/5 IP/Q/HS/TA/GS    Pulses: 2+ DP / 2+ PT    SILT DP/SP/S/S/TN      Imaging:  No new imaging taken today.    BMI:   Estimated body mass index is 32.57 kg/m² as calculated from the following:    Height as of this encounter: 5' 1\" (1.549 m).    Weight as of this encounter: 78.2 kg (172 lb 6.4 oz).  BSA:   Estimated body surface area is 1.77 meters squared as calculated from the following:    Height as of this encounter: 5' 1\" (1.549 m).    Weight as of this encounter: 78.2 kg (172 lb 6.4 oz).           Scribe Attestation      I,:   am acting as a scribe while in the presence of the attending physician.:       I,:   personally performed the services described in this documentation    as scribed in my presence.:               "

## 2024-09-11 ENCOUNTER — TELEPHONE (OUTPATIENT)
Dept: OBGYN CLINIC | Facility: HOSPITAL | Age: 54
End: 2024-09-11

## 2024-09-12 ENCOUNTER — TELEPHONE (OUTPATIENT)
Age: 54
End: 2024-09-12

## 2024-09-12 NOTE — TELEPHONE ENCOUNTER
Caller: Patient    Doctor: Vikram    Reason for call: Has facet injection scheduled for 9/13 w/[facility]. Questioned if it was okay to get the injection prior to her sx?    Call back#: [phone]

## 2024-09-17 NOTE — TELEPHONE ENCOUNTER
Caller: Neda     Doctor: Dr Sue     Reason for call: Returning your call  Call back#: [phone]

## 2024-09-19 ENCOUNTER — TELEPHONE (OUTPATIENT)
Age: 54
End: 2024-09-19

## 2024-09-19 NOTE — TELEPHONE ENCOUNTER
Left message for patient. She needs to complete her labs and EKG by this weekend, and to confirm periop appt with Jocelynn on 9/26 @ 2pm at [address] [place] [address]. She also needs to log into her Mychart to sign her surgery consent.

## 2024-09-24 NOTE — PRE-PROCEDURE INSTRUCTIONS
Pre-Surgery Instructions:   Medication Instructions    amLODIPine (NORVASC) 10 mg tablet Take day of surgery.    ascorbic acid (VITAMIN C) 500 MG tablet Hold day of surgery.    cetirizine (ZyrTEC) 10 mg tablet Take day of surgery.    Cholecalciferol (VITAMIN D3) 1,000 units tablet Hold day of surgery.    dexamethasone sodium phosphate 0.1 % ophthalmic solution Uses PRN- OK to take day of surgery    estradiol (Melissa) 0.0375 MG/24HR this medication may need to be discontinued for a least 4 weeks prior to your surgery if the surgical procedure is associated with a high risk of blod clots as in hip or knee replacement for example. Patient reports she will stop taking now.     fluocinonide (LIDEX) 0.05 % ointment Hold day of surgery.    folic acid (FOLVITE) 1 mg tablet Hold day of surgery.    hydrOXYzine HCL (ATARAX) 10 mg tablet Uses PRN- OK to take day of surgery    lurasidone (Latuda) 40 mg tablet Take day of surgery.    methocarbamol (ROBAXIN) 750 mg tablet Uses PRN- OK to take day of surgery    Multiple Vitamins-Minerals (multivitamin with minerals) tablet Hold day of surgery.    omeprazole (PriLOSEC) 40 MG capsule Take day of surgery.    PARoxetine (PAXIL) 10 mg tablet Take day of surgery.    Progesterone 200 MG CAPS this medication may need to be discontinued for a least 4 weeks prior to your surgery if the surgical procedure is associated with a high risk of blod clots as in hip or knee replacement for example. Patient reports she will stop taking now.     Restasis 0.05 % ophthalmic emulsion Take day of surgery.    tacrolimus (PROTOPIC) 0.1 % ointment Hold day of surgery.    traZODone (DESYREL) 100 mg tablet Take night before surgery     Medication instructions for day surgery reviewed. Please use only a sip of water to take your instructed medications. Avoid all over the counter vitamins, supplements and NSAIDS for one week prior to surgery per anesthesia guidelines. Tylenol is ok to take as needed.     You will  receive a call one business day prior to surgery with an arrival time and hospital directions. If your surgery is scheduled on a Monday, the hospital will be calling you on the Friday prior to your surgery. If you have not heard from anyone by 8pm, please call the hospital supervisor through the hospital  at [phone]. ([place] [phone] or [place] [phone]).    Do not eat or drink anything after midnight the night before your surgery, including candy, mints, lifesavers, or chewing gum. Do not drink alcohol 24hrs before your surgery. Try not to smoke at least 24hrs before your surgery.       Follow the pre surgery showering instructions as listed in the “My Surgical Experience Booklet” or otherwise provided by your surgeon's office. Do not use a blade to shave the surgical area 1 week before surgery. It is okay to use a clean electric clippers up to 24 hours before surgery. Do not apply any lotions, creams, including makeup, cologne, deodorant, or perfumes after showering on the day of your surgery. Do not use dry shampoo, hair spray, hair gel, or any type of hair products.     No contact lenses, eye make-up, or artificial eyelashes. Remove nail polish, including gel polish, and any artificial, gel, or acrylic nails if possible. Remove all jewelry including rings and body piercing jewelry.     Wear causal clothing that is easy to take on and off. Consider your type of surgery.    Keep any valuables, jewelry, piercings at home. Please bring any specially ordered equipment (sling, braces) if indicated.    Arrange for a responsible person to drive you to and from the hospital on the day of your surgery. Please confirm the visitor policy for the day of your procedure when you receive your phone call with an arrival time.     Call the surgeon's office with any new illnesses, exposures, or additional questions prior to surgery.    Please reference your “My Surgical Experience Booklet” for additional  information to prepare for your upcoming surgery.     Reviewed ortho packet. Please wear sneakers to hospital, you will start physical therapy in the hospital. Once you are discharged, focus on hand hygiene, clear pathways at home and any tripping hazards, use incentive spirometer to prevent post-op complications.

## 2024-09-25 ENCOUNTER — APPOINTMENT (OUTPATIENT)
Dept: LAB | Facility: HOSPITAL | Age: 54
End: 2024-09-25
Payer: COMMERCIAL

## 2024-09-25 DIAGNOSIS — E66.09 CLASS 1 OBESITY DUE TO EXCESS CALORIES WITHOUT SERIOUS COMORBIDITY WITH BODY MASS INDEX (BMI) OF 32.0 TO 32.9 IN ADULT: ICD-10-CM

## 2024-09-25 DIAGNOSIS — M17.12 PRIMARY OSTEOARTHRITIS OF LEFT KNEE: ICD-10-CM

## 2024-09-25 DIAGNOSIS — E66.811 CLASS 1 OBESITY DUE TO EXCESS CALORIES WITHOUT SERIOUS COMORBIDITY WITH BODY MASS INDEX (BMI) OF 32.0 TO 32.9 IN ADULT: ICD-10-CM

## 2024-09-25 DIAGNOSIS — I10 BENIGN HYPERTENSION: ICD-10-CM

## 2024-09-25 LAB
ABO GROUP BLD: NORMAL
ALBUMIN SERPL BCG-MCNC: 4.2 G/DL (ref 3.5–5)
ALP SERPL-CCNC: 98 U/L (ref 34–104)
ALT SERPL W P-5'-P-CCNC: 12 U/L (ref 7–52)
ANION GAP SERPL CALCULATED.3IONS-SCNC: 8 MMOL/L (ref 4–13)
APTT PPP: 31 SECONDS (ref 23–34)
AST SERPL W P-5'-P-CCNC: 13 U/L (ref 13–39)
ATRIAL RATE: 66 BPM
BASOPHILS # BLD AUTO: 0.06 THOUSANDS/ΜL (ref 0–0.1)
BASOPHILS NFR BLD AUTO: 1 % (ref 0–1)
BILIRUB SERPL-MCNC: 0.53 MG/DL (ref 0.2–1)
BLD GP AB SCN SERPL QL: NEGATIVE
BUN SERPL-MCNC: 14 MG/DL (ref 5–25)
CALCIUM SERPL-MCNC: 10 MG/DL (ref 8.4–10.2)
CHLORIDE SERPL-SCNC: 106 MMOL/L (ref 96–108)
CO2 SERPL-SCNC: 24 MMOL/L (ref 21–32)
CREAT SERPL-MCNC: 0.71 MG/DL (ref 0.6–1.3)
EOSINOPHIL # BLD AUTO: 0.13 THOUSAND/ΜL (ref 0–0.61)
EOSINOPHIL NFR BLD AUTO: 2 % (ref 0–6)
ERYTHROCYTE [DISTWIDTH] IN BLOOD BY AUTOMATED COUNT: 14.7 % (ref 11.6–15.1)
EST. AVERAGE GLUCOSE BLD GHB EST-MCNC: 120 MG/DL
GFR SERPL CREATININE-BSD FRML MDRD: 95 ML/MIN/1.73SQ M
GLUCOSE P FAST SERPL-MCNC: 90 MG/DL (ref 65–99)
HBA1C MFR BLD: 5.8 %
HCT VFR BLD AUTO: 42.1 % (ref 34.8–46.1)
HGB BLD-MCNC: 13.7 G/DL (ref 11.5–15.4)
IMM GRANULOCYTES # BLD AUTO: 0.02 THOUSAND/UL (ref 0–0.2)
IMM GRANULOCYTES NFR BLD AUTO: 0 % (ref 0–2)
INR PPP: 1.06 (ref 0.85–1.19)
LYMPHOCYTES # BLD AUTO: 2.57 THOUSANDS/ΜL (ref 0.6–4.47)
LYMPHOCYTES NFR BLD AUTO: 38 % (ref 14–44)
MCH RBC QN AUTO: 28.7 PG (ref 26.8–34.3)
MCHC RBC AUTO-ENTMCNC: 32.5 G/DL (ref 31.4–37.4)
MCV RBC AUTO: 88 FL (ref 82–98)
MONOCYTES # BLD AUTO: 0.35 THOUSAND/ΜL (ref 0.17–1.22)
MONOCYTES NFR BLD AUTO: 5 % (ref 4–12)
NEUTROPHILS # BLD AUTO: 3.73 THOUSANDS/ΜL (ref 1.85–7.62)
NEUTS SEG NFR BLD AUTO: 54 % (ref 43–75)
NRBC BLD AUTO-RTO: 0 /100 WBCS
P AXIS: 76 DEGREES
PLATELET # BLD AUTO: 408 THOUSANDS/UL (ref 149–390)
PMV BLD AUTO: 10.2 FL (ref 8.9–12.7)
POTASSIUM SERPL-SCNC: 3.7 MMOL/L (ref 3.5–5.3)
PR INTERVAL: 112 MS
PROT SERPL-MCNC: 8.1 G/DL (ref 6.4–8.4)
PROTHROMBIN TIME: 14 SECONDS (ref 12.3–15)
QRS AXIS: 64 DEGREES
QRSD INTERVAL: 88 MS
QT INTERVAL: 440 MS
QTC INTERVAL: 461 MS
RBC # BLD AUTO: 4.78 MILLION/UL (ref 3.81–5.12)
RH BLD: POSITIVE
SODIUM SERPL-SCNC: 138 MMOL/L (ref 135–147)
SPECIMEN EXPIRATION DATE: NORMAL
T WAVE AXIS: 69 DEGREES
VENTRICULAR RATE: 66 BPM
WBC # BLD AUTO: 6.86 THOUSAND/UL (ref 4.31–10.16)

## 2024-09-25 PROCEDURE — 86900 BLOOD TYPING SEROLOGIC ABO: CPT

## 2024-09-25 PROCEDURE — 85730 THROMBOPLASTIN TIME PARTIAL: CPT

## 2024-09-25 PROCEDURE — 86901 BLOOD TYPING SEROLOGIC RH(D): CPT

## 2024-09-25 PROCEDURE — 80053 COMPREHEN METABOLIC PANEL: CPT

## 2024-09-25 PROCEDURE — 93010 ELECTROCARDIOGRAM REPORT: CPT

## 2024-09-25 PROCEDURE — 86850 RBC ANTIBODY SCREEN: CPT

## 2024-09-25 PROCEDURE — 83036 HEMOGLOBIN GLYCOSYLATED A1C: CPT

## 2024-09-25 PROCEDURE — 93005 ELECTROCARDIOGRAM TRACING: CPT

## 2024-09-25 PROCEDURE — 85610 PROTHROMBIN TIME: CPT

## 2024-09-25 PROCEDURE — 85025 COMPLETE CBC W/AUTO DIFF WBC: CPT

## 2024-09-25 PROCEDURE — 36415 COLL VENOUS BLD VENIPUNCTURE: CPT

## 2024-09-27 ENCOUNTER — TELEPHONE (OUTPATIENT)
Dept: OBGYN CLINIC | Facility: CLINIC | Age: 54
End: 2024-09-27

## 2024-09-30 ENCOUNTER — TELEPHONE (OUTPATIENT)
Dept: OBGYN CLINIC | Facility: CLINIC | Age: 54
End: 2024-09-30

## 2024-09-30 NOTE — TELEPHONE ENCOUNTER
Tried calling patient to reschedule missed period appt from 9/26. Unable to reach patient, number is disconnected.

## 2024-10-01 ENCOUNTER — TELEPHONE (OUTPATIENT)
Age: 54
End: 2024-10-01

## 2024-10-01 NOTE — TELEPHONE ENCOUNTER
Caller: Patient    Doctor/Office: Gilson    Call regarding :  returned call     Call was transferred to: Samra TRIPP

## 2024-10-03 ENCOUNTER — OFFICE VISIT (OUTPATIENT)
Age: 54
End: 2024-10-03
Payer: COMMERCIAL

## 2024-10-03 ENCOUNTER — OFFICE VISIT (OUTPATIENT)
Dept: OTOLARYNGOLOGY | Facility: CLINIC | Age: 54
End: 2024-10-03

## 2024-10-03 ENCOUNTER — CLINICAL SUPPORT (OUTPATIENT)
Dept: OBGYN CLINIC | Facility: CLINIC | Age: 54
End: 2024-10-03

## 2024-10-03 ENCOUNTER — ANESTHESIA EVENT (OUTPATIENT)
Age: 54
End: 2024-10-03
Payer: COMMERCIAL

## 2024-10-03 VITALS
HEART RATE: 56 BPM | BODY MASS INDEX: 32.1 KG/M2 | SYSTOLIC BLOOD PRESSURE: 118 MMHG | WEIGHT: 170 LBS | DIASTOLIC BLOOD PRESSURE: 75 MMHG | HEIGHT: 61 IN

## 2024-10-03 VITALS
WEIGHT: 171.8 LBS | SYSTOLIC BLOOD PRESSURE: 150 MMHG | HEART RATE: 60 BPM | BODY MASS INDEX: 32.46 KG/M2 | DIASTOLIC BLOOD PRESSURE: 85 MMHG

## 2024-10-03 DIAGNOSIS — Z98.890 HISTORY OF FACIAL FRACTURE REPAIR: Primary | ICD-10-CM

## 2024-10-03 DIAGNOSIS — Z87.81 HISTORY OF FACIAL FRACTURE REPAIR: Primary | ICD-10-CM

## 2024-10-03 DIAGNOSIS — M17.12 PRIMARY OSTEOARTHRITIS OF LEFT KNEE: ICD-10-CM

## 2024-10-03 DIAGNOSIS — F31.61 BIPOLAR DISORDER, CURRENT EPISODE MIXED, MILD (HCC): ICD-10-CM

## 2024-10-03 DIAGNOSIS — E66.811 OBESITY (BMI 30.0-34.9): ICD-10-CM

## 2024-10-03 DIAGNOSIS — A52.8 LATE LATENT SYPHILIS: Primary | ICD-10-CM

## 2024-10-03 DIAGNOSIS — I10 PRIMARY HYPERTENSION: ICD-10-CM

## 2024-10-03 DIAGNOSIS — M95.0 ACQUIRED NASAL DEFORMITY: ICD-10-CM

## 2024-10-03 DIAGNOSIS — J32.4 CHRONIC PANSINUSITIS: ICD-10-CM

## 2024-10-03 DIAGNOSIS — Z01.818 PREOPERATIVE CLEARANCE: Primary | ICD-10-CM

## 2024-10-03 DIAGNOSIS — R73.03 PREDIABETES: ICD-10-CM

## 2024-10-03 DIAGNOSIS — Z86.19 HISTORY OF SYPHILIS: ICD-10-CM

## 2024-10-03 DIAGNOSIS — J34.2 DEVIATED NASAL SEPTUM: ICD-10-CM

## 2024-10-03 PROCEDURE — 99215 OFFICE O/P EST HI 40 MIN: CPT | Performed by: NURSE PRACTITIONER

## 2024-10-03 PROCEDURE — 96372 THER/PROPH/DIAG INJ SC/IM: CPT

## 2024-10-03 NOTE — PROGRESS NOTES
Internal Medicine Pre-Operative Evaluation:     Reason for Visit: Pre-operative Evaluation for Risk Stratification and Optimization    Patient ID: Neda Anna is a 54 y.o. female.     Surgery: Arthroplasty of left knee  Referring Provider: Dr Sue      Recommendations to Proceed withSurgery    Patient is considered to be Low risk for Medium risk procedure.     After evaluation and discussion with patient with emphasis that all surgery has some degree of inherent risk it is acknowledged by patient this risk is Acceptable.    Patient is optimized and may proceed with planned procedure.     Assessment    Pre-operative Medical Evaluation for planned surgery  Recommendations as listed in PLAN section below  Contact surgical nurse  navigator with any questions regarding preoperative plan or schedule.      Assessment & Plan  Primary osteoarthritis of left knee  Failed outpatient conservative measures  Electing to undergo arthroplasty    History of syphilis  Recently diagnosed with latent syphilis  Treated as recommended by ob/gyn with 3 doses PCN weekly  Last dose 10/2/2024  No further symptoms  Obesity (BMI 30.0-34.9)  Recommend ongoing attempts at weight loss  Current BMI meets criteria of <40 per [facility] preoperative qualifications    Bipolar disorder, current episode mixed, mild (HCC)    Primary hypertension  Stable  Monitor post operative BP   Avoid hypotension if at all possible  Refer to PAT instructions regarding medication administration the morning of surgery    Prediabetes  Hgb A1c 5.8  Recommend following DM diet  Monitor FBS    Preoperative clearance             Plan:     1. Further preoperative workup as follows:   - none no further testing required may proceed with surgery    2. Preoperative Medication Management Review performed by PAT nursing  YES    3. Patient requires further consultation with:   No Consults Required    4. Discharge Planning / Barriers to Discharge  none identified - patients has  post discharge therapy plan in place, transportation arranged for discharge day, adequate family support at home to assist with discharge to home.        Subjective:           History of Present Illness:     Neda Anna is a 54 y.o. female who presents to the office today for a preoperative consultation at the request of surgeon. The patient understands this is an elective procedure and not emergent. They are electing to undergo planned procedure with an understanding that all surgery has inherent risk. They have worked with their surgeon and failed conservative treatment measures. Today they present for preoperative risk assessment and recommendations for optimization in preparation for surgery.    Pt was previously scheduled and cancelled. She was diagnosed with latent syphilis and was supposed to receive PCN weekly x 3 doses per obgyn. She has completed her series of PCN and is asymptomatic. According to the patient, she states she will always test positive and she asked to go through a series of treatment prior to surgery, she was NOT having symptoms     Pt seen in surgical optimization center for upcoming proposed surgery. They have failed previous conservative measures and have elected surgical intervention.     Pt meets presurgical lab and BMI optimization goals.      Neda Anna has an IN HOSPITAL cardiac risk of RCI RISK CLASS I (0 risk factors, risk of major cardiac compl. appr. 0.5%) based on RCRI calculator    Cardiac Risk Estimation: per the Revised Cardiac Risk Index (Circ. 100:1043, 1999),         Pre-op Exam    Previous history of bleeding disorders or clots?: No  Previous Anesthesia reaction?: No  Prolonged steroid use in the last 6 months?: No    Assessment of Cardiac Risk:   - Unstable or severe angina or MI in the last 6 weeks or history of stent placement in the last year?: No   - Decompensated heart failure (e.g. New onset heart failure, NYHA  Class IV heart failure, or worsening existing  "heart failure)?: No  - Significant arrhythmias such as high grade AV block, symptomatic ventricular arrhythmia, newly recognized ventricular tachycardia, supraventricular tachycardia with resting heart rate >100, or symptomatic bradycardia?: No  - Severe heart valve disease including aortic stenosis or symptomatic mitral stenosis?: No      Pre-operative Risk Factors:  Elevated-risk surgery: No    History of cerebrovascular disease: No    History of ischemic heart disease: No  Pre-operative treatment with insulin: No  Pre-operative creatinine >2 mg/dL: No    History of congestive heart failure: No    D[facility] Activity Status Index (DASI):   DASI Total Score: 18.95  METs: 5.1        ROS: No TIA's or unusual headaches, no dysphagia.  No prolonged cough. No dyspnea or chest pain on exertion.  No abdominal pain, change in bowel habits, black or bloody stools.  No urinary tract or BPH symptoms.  Positive reported pain in arthritic joint. Positive difficulty with gait. No skin rashes or issues.      Objective:    /75   Pulse 56   Ht 5' 1\" (1.549 m)   Wt 77.1 kg (170 lb)   LMP 05/10/2016   BMI 32.12 kg/m²       General Appearance: no distress, conversive  HEENT: PERRLA, conjuctiva normal; oropharynx clear; mucous membranes moist;   Neck:  Supple, no lymphadenopathy or thyromegaly  Lungs: breath sounds normal, normal respiratory effort, no retractions, expiratory effort normal  CV: normal heart sounds S1/S2, PMI normal   ABD: soft non tender, +BSx4  EXT: DP pulses intact, no lymphadenopathy, no edema  Skin: normal turgor, normal texture, no rash  Psych: affect normal, mood normal  Neuro: AAOx3        The following portions of the patient's history were reviewed and updated as appropriate: allergies, current medications, past family history, past medical history, past social history, past surgical history and problem list.     Past History:       Past Medical History:   Diagnosis Date    Anxiety     Arthritis     " Bipolar 1 disorder (HCC)     Chronic neck and back pain     Class 1 obesity due to excess calories with serious comorbidity and body mass index (BMI) of 32.0 to 32.9 in adult 03/13/2020    Depression     Dizziness     GERD (gastroesophageal reflux disease)     Headache     Headache(784.0)     History of transfusion     Hypertension     Joint pain     and swelling    Night sweats     Obesity     Osteoarthritis     PTSD (post-traumatic stress disorder)     Seizures (HCC)     Past Surgical History:   Procedure Laterality Date    COLONOSCOPY      FACIAL FRACTURE SURGERY      FRACTURE SURGERY      KNEE ARTHROSCOPY W/ MENISCECTOMY Left     ORTHOPEDIC SURGERY      TENDON REPAIR            Social History     Tobacco Use    Smoking status: Passive Smoke Exposure - Never Smoker     Passive exposure: Past    Smokeless tobacco: Never   Vaping Use    Vaping status: Never Used   Substance Use Topics    Alcohol use: Yes     Alcohol/week: 6.0 - 7.0 standard drinks of alcohol     Types: 3 Glasses of wine, 3 - 4 Cans of beer per week     Comment: social    Drug use: Yes     Types: Marijuana     Comment: daily     Family History   Problem Relation Age of Onset    Diabetes Mother     Hypertension Mother     Thyroid disease Mother     No Known Problems Father     No Known Problems Sister     No Known Problems Daughter     Lung cancer Maternal Grandmother     No Known Problems Maternal Grandfather     No Known Problems Paternal Grandmother     No Known Problems Paternal Grandfather     No Known Problems Brother     Heart disease Neg Hx     Stroke Neg Hx     Breast cancer Neg Hx           Allergies:     No Known Allergies     Current Medications:     Current Outpatient Medications   Medication Instructions    amLODIPine (NORVASC) 10 mg, Oral, Daily    ascorbic acid (VITAMIN C) 500 mg, Oral, 2 times daily    cetirizine (ZyrTEC) 10 mg tablet     Cholecalciferol (VITAMIN D3) 2,000 Units, Oral, Daily    dexamethasone sodium phosphate 0.1 %  "ophthalmic solution     estradiol (Melissa) 0.0375 MG/24HR 1 patch, Transdermal, 2 times weekly    fluocinonide (LIDEX) 0.05 % ointment     folic acid (FOLVITE) 1 mg, Oral, Daily    hydrOXYzine HCL (ATARAX) 10 mg tablet     lurasidone (LATUDA) 40 mg, Oral, Daily with dinner    methocarbamol (ROBAXIN) 750 mg, Oral, Every 12 hours PRN    Multiple Vitamins-Minerals (multivitamin with minerals) tablet 1 tablet, Oral, Daily    omeprazole (PRILOSEC) 40 mg, Oral, Daily    PARoxetine (PAXIL) 10 mg, Oral, Daily    Progesterone 200 mg, Oral, SL MYCHART Nightly    Restasis 0.05 % ophthalmic emulsion     tacrolimus (PROTOPIC) 0.1 % ointment     traZODone (DESYREL) 100 mg, Oral, Daily at bedtime           PRE-OP WORKSHEET DATA    Assessment of Pre-Operative Risks     [facility] Quality Hard Stops:    BMI (<40) : Estimated body mass index is 32.12 kg/m² as calculated from the following:    Height as of this encounter: 5' 1\" (1.549 m).    Weight as of this encounter: 77.1 kg (170 lb).    Hgb ( >11):   Lab Results   Component Value Date    HGB 13.7 09/25/2024    HGB 13.5 07/17/2024    HGB 13.2 04/22/2024       HbA1c (<7.5) :   Lab Results   Component Value Date    HGBA1C 5.8 (H) 09/25/2024       GFR (>60) (Less then 45 = Nephrology consult):    Lab Results   Component Value Date    EGFR 95 09/25/2024    EGFR 64 07/17/2024    EGFR 82 04/22/2024            Pre-Op Data Reviewed:       Laboratory Results: I have personally reviewed the pertinent laboratory results/reports     EKG:I have personally reviewed pertinent reports.  . I personally reviewed and interpreted available tracings in the electronic medical record    Encounter Date: 09/25/24   EKG 12 lead   Result Value    Ventricular Rate 66    Atrial Rate 66    MO Interval 112    QRSD Interval 88    QT Interval 440    QTC Interval 461    P Axis 76    QRS Axis 64    T Wave Axis 69    Narrative    Normal sinus rhythm with sinus arrhythmia  Possible Left atrial enlargement  Borderline " ECG  When compared with ECG of 22-APR-2024 09:49,  No significant change was found  Confirmed by Yury Trujillo ([id number]) on 9/25/2024 4:21:24 PM       OLD RECORDS: reviewed old records in the chart review section if EHR on day of visit.    Previous cardiopulmonary studies within the past year:  Echocardiogram: no   Cardiac Catheterization: no  Stress Test: no      Time of visit including pre-visit chart review, visit and post-visit coordination of plan and care , review of pre-surgical lab work, preparation and time spent documenting note in electronic medical record, time spent face-to-face in physical examination answering patient questions by care team 35 minutes             [facility]

## 2024-10-03 NOTE — H&P (VIEW-ONLY)
Internal Medicine Pre-Operative Evaluation:     Reason for Visit: Pre-operative Evaluation for Risk Stratification and Optimization    Patient ID: Neda Anna is a 54 y.o. female.     Surgery: Arthroplasty of left knee  Referring Provider: Dr Sue      Recommendations to Proceed withSurgery    Patient is considered to be Low risk for Medium risk procedure.     After evaluation and discussion with patient with emphasis that all surgery has some degree of inherent risk it is acknowledged by patient this risk is Acceptable.    Patient is optimized and may proceed with planned procedure.     Assessment    Pre-operative Medical Evaluation for planned surgery  Recommendations as listed in PLAN section below  Contact surgical nurse  navigator with any questions regarding preoperative plan or schedule.      Assessment & Plan  Primary osteoarthritis of left knee  Failed outpatient conservative measures  Electing to undergo arthroplasty    History of syphilis  Recently diagnosed with latent syphilis  Treated as recommended by ob/gyn with 3 doses PCN weekly  Last dose 10/2/2024  No further symptoms  Obesity (BMI 30.0-34.9)  Recommend ongoing attempts at weight loss  Current BMI meets criteria of <40 per [facility] preoperative qualifications    Bipolar disorder, current episode mixed, mild (HCC)    Primary hypertension  Stable  Monitor post operative BP   Avoid hypotension if at all possible  Refer to PAT instructions regarding medication administration the morning of surgery    Prediabetes  Hgb A1c 5.8  Recommend following DM diet  Monitor FBS    Preoperative clearance             Plan:     1. Further preoperative workup as follows:   - none no further testing required may proceed with surgery    2. Preoperative Medication Management Review performed by PAT nursing  YES    3. Patient requires further consultation with:   No Consults Required    4. Discharge Planning / Barriers to Discharge  none identified - patients has  post discharge therapy plan in place, transportation arranged for discharge day, adequate family support at home to assist with discharge to home.        Subjective:           History of Present Illness:     Neda Anna is a 54 y.o. female who presents to the office today for a preoperative consultation at the request of surgeon. The patient understands this is an elective procedure and not emergent. They are electing to undergo planned procedure with an understanding that all surgery has inherent risk. They have worked with their surgeon and failed conservative treatment measures. Today they present for preoperative risk assessment and recommendations for optimization in preparation for surgery.    Pt was previously scheduled and cancelled. She was diagnosed with latent syphilis and was supposed to receive PCN weekly x 3 doses per obgyn. She has completed her series of PCN and is asymptomatic. According to the patient, she states she will always test positive and she asked to go through a series of treatment prior to surgery, she was NOT having symptoms     Pt seen in surgical optimization center for upcoming proposed surgery. They have failed previous conservative measures and have elected surgical intervention.     Pt meets presurgical lab and BMI optimization goals.      Neda Anna has an IN HOSPITAL cardiac risk of RCI RISK CLASS I (0 risk factors, risk of major cardiac compl. appr. 0.5%) based on RCRI calculator    Cardiac Risk Estimation: per the Revised Cardiac Risk Index (Circ. 100:1043, 1999),         Pre-op Exam    Previous history of bleeding disorders or clots?: No  Previous Anesthesia reaction?: No  Prolonged steroid use in the last 6 months?: No    Assessment of Cardiac Risk:   - Unstable or severe angina or MI in the last 6 weeks or history of stent placement in the last year?: No   - Decompensated heart failure (e.g. New onset heart failure, NYHA  Class IV heart failure, or worsening existing  "heart failure)?: No  - Significant arrhythmias such as high grade AV block, symptomatic ventricular arrhythmia, newly recognized ventricular tachycardia, supraventricular tachycardia with resting heart rate >100, or symptomatic bradycardia?: No  - Severe heart valve disease including aortic stenosis or symptomatic mitral stenosis?: No      Pre-operative Risk Factors:  Elevated-risk surgery: No    History of cerebrovascular disease: No    History of ischemic heart disease: No  Pre-operative treatment with insulin: No  Pre-operative creatinine >2 mg/dL: No    History of congestive heart failure: No    Duke Activity Status Index (DASI):   DASI Total Score: 18.95  METs: 5.1        ROS: No TIA's or unusual headaches, no dysphagia.  No prolonged cough. No dyspnea or chest pain on exertion.  No abdominal pain, change in bowel habits, black or bloody stools.  No urinary tract or BPH symptoms.  Positive reported pain in arthritic joint. Positive difficulty with gait. No skin rashes or issues.      Objective:    /75   Pulse 56   Ht 5' 1\" (1.549 m)   Wt 77.1 kg (170 lb)   LMP 05/10/2016   BMI 32.12 kg/m²       General Appearance: no distress, conversive  HEENT: PERRLA, conjuctiva normal; oropharynx clear; mucous membranes moist;   Neck:  Supple, no lymphadenopathy or thyromegaly  Lungs: breath sounds normal, normal respiratory effort, no retractions, expiratory effort normal  CV: normal heart sounds S1/S2, PMI normal   ABD: soft non tender, +BSx4  EXT: DP pulses intact, no lymphadenopathy, no edema  Skin: normal turgor, normal texture, no rash  Psych: affect normal, mood normal  Neuro: AAOx3        The following portions of the patient's history were reviewed and updated as appropriate: allergies, current medications, past family history, past medical history, past social history, past surgical history and problem list.     Past History:       Past Medical History:   Diagnosis Date    Anxiety     Arthritis     " Bipolar 1 disorder (HCC)     Chronic neck and back pain     Class 1 obesity due to excess calories with serious comorbidity and body mass index (BMI) of 32.0 to 32.9 in adult 03/13/2020    Depression     Dizziness     GERD (gastroesophageal reflux disease)     Headache     Headache(784.0)     History of transfusion     Hypertension     Joint pain     and swelling    Night sweats     Obesity     Osteoarthritis     PTSD (post-traumatic stress disorder)     Seizures (HCC)     Past Surgical History:   Procedure Laterality Date    COLONOSCOPY      FACIAL FRACTURE SURGERY      FRACTURE SURGERY      KNEE ARTHROSCOPY W/ MENISCECTOMY Left     ORTHOPEDIC SURGERY      TENDON REPAIR            Social History     Tobacco Use    Smoking status: Passive Smoke Exposure - Never Smoker     Passive exposure: Past    Smokeless tobacco: Never   Vaping Use    Vaping status: Never Used   Substance Use Topics    Alcohol use: Yes     Alcohol/week: 6.0 - 7.0 standard drinks of alcohol     Types: 3 Glasses of wine, 3 - 4 Cans of beer per week     Comment: social    Drug use: Yes     Types: Marijuana     Comment: daily     Family History   Problem Relation Age of Onset    Diabetes Mother     Hypertension Mother     Thyroid disease Mother     No Known Problems Father     No Known Problems Sister     No Known Problems Daughter     Lung cancer Maternal Grandmother     No Known Problems Maternal Grandfather     No Known Problems Paternal Grandmother     No Known Problems Paternal Grandfather     No Known Problems Brother     Heart disease Neg Hx     Stroke Neg Hx     Breast cancer Neg Hx           Allergies:     No Known Allergies     Current Medications:     Current Outpatient Medications   Medication Instructions    amLODIPine (NORVASC) 10 mg, Oral, Daily    ascorbic acid (VITAMIN C) 500 mg, Oral, 2 times daily    cetirizine (ZyrTEC) 10 mg tablet     Cholecalciferol (VITAMIN D3) 2,000 Units, Oral, Daily    dexamethasone sodium phosphate 0.1 %  "ophthalmic solution     estradiol (Melissa) 0.0375 MG/24HR 1 patch, Transdermal, 2 times weekly    fluocinonide (LIDEX) 0.05 % ointment     folic acid (FOLVITE) 1 mg, Oral, Daily    hydrOXYzine HCL (ATARAX) 10 mg tablet     lurasidone (LATUDA) 40 mg, Oral, Daily with dinner    methocarbamol (ROBAXIN) 750 mg, Oral, Every 12 hours PRN    Multiple Vitamins-Minerals (multivitamin with minerals) tablet 1 tablet, Oral, Daily    omeprazole (PRILOSEC) 40 mg, Oral, Daily    PARoxetine (PAXIL) 10 mg, Oral, Daily    Progesterone 200 mg, Oral, SL MYCHART Nightly    Restasis 0.05 % ophthalmic emulsion     tacrolimus (PROTOPIC) 0.1 % ointment     traZODone (DESYREL) 100 mg, Oral, Daily at bedtime           PRE-OP WORKSHEET DATA    Assessment of Pre-Operative Risks     MLJ Quality Hard Stops:    BMI (<40) : Estimated body mass index is 32.12 kg/m² as calculated from the following:    Height as of this encounter: 5' 1\" (1.549 m).    Weight as of this encounter: 77.1 kg (170 lb).    Hgb ( >11):   Lab Results   Component Value Date    HGB 13.7 09/25/2024    HGB 13.5 07/17/2024    HGB 13.2 04/22/2024       HbA1c (<7.5) :   Lab Results   Component Value Date    HGBA1C 5.8 (H) 09/25/2024       GFR (>60) (Less then 45 = Nephrology consult):    Lab Results   Component Value Date    EGFR 95 09/25/2024    EGFR 64 07/17/2024    EGFR 82 04/22/2024            Pre-Op Data Reviewed:       Laboratory Results: I have personally reviewed the pertinent laboratory results/reports     EKG:I have personally reviewed pertinent reports.  . I personally reviewed and interpreted available tracings in the electronic medical record    Encounter Date: 09/25/24   EKG 12 lead   Result Value    Ventricular Rate 66    Atrial Rate 66    NE Interval 112    QRSD Interval 88    QT Interval 440    QTC Interval 461    P Axis 76    QRS Axis 64    T Wave Axis 69    Narrative    Normal sinus rhythm with sinus arrhythmia  Possible Left atrial enlargement  Borderline " ECG  When compared with ECG of 22-APR-2024 09:49,  No significant change was found  Confirmed by Yury Trujillo ([id number]) on 9/25/2024 4:21:24 PM       OLD RECORDS: reviewed old records in the chart review section if EHR on day of visit.    Previous cardiopulmonary studies within the past year:  Echocardiogram: no   Cardiac Catheterization: no  Stress Test: no      Time of visit including pre-visit chart review, visit and post-visit coordination of plan and care , review of pre-surgical lab work, preparation and time spent documenting note in electronic medical record, time spent face-to-face in physical examination answering patient questions by care team 35 minutes             [facility]
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
## 2024-10-03 NOTE — PROGRESS NOTES
Pt given 3rd Bicillin injection in the left buttocks. Pt was unable to stay to sit for 15min due to having another appt. States she was fine after last injection. She asked if we need anything else from her to please send it VIA My chart. I will send information to       NDC#[id number]  Lot#[id number]  EXP#04/30/2025

## 2024-10-03 NOTE — PATIENT INSTRUCTIONS
BEFORE SURGERY    Contact your surgical nurse  navigator with any questions regarding preoperative plan or schedule.  Stop all over the counter supplements, herbal, naturopathic  medications for 1 week prior to surgery UNLESS prescribed by your surgeon  Hold NSAIDS (i.e. advil, alleve, motrin, ibuprofen, celebrex) minimum 5 days prior to surgery  Follow presurgical medication instructions provided by preadmission nursing team reviewed during your presurgery phone call  Strategies for optimizing your surgery through breathing exercises, nutrition and physical activity can be found at [email or web address]  Call [phone] with any presurgical concerns or medications questions or use the messaging feature in your [employer] sruthi to contact your provider    AFTER SURGERY    Recommend using Tylenol ( acetaminophen ) 1000 mg every eight hours during the first week post discharge along with icing the area for 20 mins every 3-4 hours while awake can be helpful in reducing your need for post operative opioid use. This opioid sparing plan can be used along side your surgeons pain plan.  Use stool softener over the counter (colace) daily after surgery during the first 1-2 weeks to avoid post operative constipation issues  If no bowel movement within 3 days after surgery then use over the counter Miralax in addition to your stool softener   If cleared by your surgical team for activity then early and often walking is encouraged and can be important in prevention of post surgical blood clots. Additionally spend as much time out of bed as possible and allowed by your surgical team  Use your incentive spirometer twice per hour in the first seven days after surgery to help prevent post surgery lung complications and infections  It is very important you follow the instructions from your surgeon regarding any medications for after surgery blood clot prevention. Compliance with these medications is very important.  Call [phone] with  any post discharge concerns or medical issues or use the messaging feature in your [employer] sruthi to contact your provider

## 2024-10-03 NOTE — ASSESSMENT & PLAN NOTE
Recently diagnosed with latent syphilis  Treated as recommended by ob/gyn with 3 doses PCN weekly  Last dose 10/2/2024  No further symptoms

## 2024-10-13 NOTE — DISCHARGE INSTR - AVS FIRST PAGE
Dr. Sue Knee Replacement    What to Expect/Activity  It is normal to have some discomfort in your knee for several days to weeks.  You are weight bearing as tolerated to your operative leg with assist devices.  Please use crutches/walker when ambulating until your follow-up  Swelling and discomfort in the knee is normal for several days after surgery. For the first 2-3 days, use ice around the knee to help. Use for 20-30 minutes every 1-2 hours for 48 hours, while awake. You may continue beyond 48 hours as needed.  Place one or two pillows underneath your calf, not your knee, to reduce swelling.  Physical therapy on your own at home should start as soon as possible (see below). Please perform heel slides and extension exercises on your own as well (see diagram).  Please use incentive spirometer 10 times per hour while awake (see diagram).    Dressing/Wound Care/Bathing  You may remove your toe-to-groin dressing 24 hours after surgery. There will be a surgical dressing over your incision that stays in place until follow-up unless water gets under the bandage and then it should be removed.   You may start showering 24 hours after surgery, the surgical dressing will remain in place. Please pat the dressing dry. If you notice the dressing appears saturated or is starting to come off, please replace with dry dressing.  You can keep the dressing in place until follow-up in the office.   Do not place any creams, ointments or gels on or around the incision.  No baths, swimming or submerging until cleared by Dr. Sue    Pain Management/Medications  You may resume your usual medications.  Please take the following medications:  Anti-coagulation (blood clot prevention) - aspirin 81mg twice daily for 4 weeks  Pain medication:  Narcotic: Take as directed  NSAID/Anti-inflammatory: Take as directed  Tylenol 1000mg every 8 hours  Zofran (ondasetron) - 4mg every 8 hours as needed for nausea  Stool softeners (senna/colace) -  take daily to prevent constipation as narcotic pain medication causes constipation  Antibiotic - take as directed if prescribed   If you have questions or pain concerns, please contact the office. Pain medication cannot remove all post-operative pain.    Follow up/Call if:  The findings of your surgery will be explained to you and your family immediately after surgery. However, in the post-operative period, during recovery from anesthesia you may not fully remember or fully understand what was said. This will be again gone over when you return for your post-op appointment.  Please contact Dr. Sue's office if you experience the following:  Excessive bleeding (bleeding through your dressing)  Fever greater than 101 degrees F after 48 hours (low grade fevers the day or two after surgery are normal)  Persistent nausea or vomiting  Decreased sensation or discoloration of the operative limb  Pain or swelling that is getting worse and not better with medication    Dr. Sue's Office Contact: [phone]

## 2024-10-14 ENCOUNTER — ANESTHESIA (OUTPATIENT)
Age: 54
End: 2024-10-14
Payer: COMMERCIAL

## 2024-10-14 ENCOUNTER — HOSPITAL ENCOUNTER (OUTPATIENT)
Age: 54
Setting detail: OUTPATIENT SURGERY
Discharge: HOME | End: 2024-10-14
Attending: STUDENT IN AN ORGANIZED HEALTH CARE EDUCATION/TRAINING PROGRAM | Admitting: STUDENT IN AN ORGANIZED HEALTH CARE EDUCATION/TRAINING PROGRAM
Payer: COMMERCIAL

## 2024-10-14 ENCOUNTER — APPOINTMENT (OUTPATIENT)
Age: 54
End: 2024-10-14
Payer: COMMERCIAL

## 2024-10-14 VITALS
DIASTOLIC BLOOD PRESSURE: 57 MMHG | SYSTOLIC BLOOD PRESSURE: 117 MMHG | OXYGEN SATURATION: 95 % | BODY MASS INDEX: 30.29 KG/M2 | WEIGHT: 164.6 LBS | TEMPERATURE: 97.6 F | HEART RATE: 71 BPM | HEIGHT: 62 IN | RESPIRATION RATE: 23 BRPM

## 2024-10-14 DIAGNOSIS — M17.12 PRIMARY OSTEOARTHRITIS OF LEFT KNEE: Primary | ICD-10-CM

## 2024-10-14 PROCEDURE — C1776 JOINT DEVICE (IMPLANTABLE): HCPCS | Performed by: STUDENT IN AN ORGANIZED HEALTH CARE EDUCATION/TRAINING PROGRAM

## 2024-10-14 PROCEDURE — 27447 TOTAL KNEE ARTHROPLASTY: CPT | Performed by: STUDENT IN AN ORGANIZED HEALTH CARE EDUCATION/TRAINING PROGRAM

## 2024-10-14 PROCEDURE — 97163 PT EVAL HIGH COMPLEX 45 MIN: CPT | Performed by: PHYSICAL THERAPIST

## 2024-10-14 PROCEDURE — 97166 OT EVAL MOD COMPLEX 45 MIN: CPT

## 2024-10-14 PROCEDURE — C9290 INJ, BUPIVACAINE LIPOSOME: HCPCS | Performed by: ANESTHESIOLOGY

## 2024-10-14 PROCEDURE — 73560 X-RAY EXAM OF KNEE 1 OR 2: CPT

## 2024-10-14 PROCEDURE — C1713 ANCHOR/SCREW BN/BN,TIS/BN: HCPCS | Performed by: STUDENT IN AN ORGANIZED HEALTH CARE EDUCATION/TRAINING PROGRAM

## 2024-10-14 DEVICE — ATTUNE PATELLA MEDIALIZED DOME 35MM CEMENTED AOX
Type: IMPLANTABLE DEVICE | Site: KNEE | Status: FUNCTIONAL
Brand: ATTUNE

## 2024-10-14 DEVICE — ATTUNE KNEE SYSTEM TIBIAL BASE AFFIXIUM FIXED BEARING SIZE 4
Type: IMPLANTABLE DEVICE | Site: KNEE | Status: FUNCTIONAL
Brand: ATTUNE AFFIXIUM

## 2024-10-14 DEVICE — ATTUNE KNEE SYSTEM FEMORAL POROCOAT CRUCIATE RETAINING NARROW SIZE 5N LEFT CEMENTLESS
Type: IMPLANTABLE DEVICE | Site: KNEE | Status: FUNCTIONAL
Brand: ATTUNE

## 2024-10-14 DEVICE — SMARTSET HIGH PERFORMANCE MV MEDIUM VISCOSITY BONE CEMENT 40G
Type: IMPLANTABLE DEVICE | Site: KNEE | Status: FUNCTIONAL
Brand: SMARTSET

## 2024-10-14 DEVICE — ATTUNE KNEE SYSTEM TIBIAL INSERT FIXED BEARING MEDIAL STABILIZED LEFT AOX 5, 5MM
Type: IMPLANTABLE DEVICE | Site: KNEE | Status: FUNCTIONAL
Brand: ATTUNE

## 2024-10-14 RX ORDER — DEXAMETHASONE SODIUM PHOSPHATE 10 MG/ML
INJECTION, SOLUTION INTRAMUSCULAR; INTRAVENOUS AS NEEDED
Status: DISCONTINUED | OUTPATIENT
Start: 2024-10-14 | End: 2024-10-14

## 2024-10-14 RX ORDER — PAROXETINE 10 MG/1
10 TABLET, FILM COATED ORAL DAILY
Status: CANCELLED | OUTPATIENT
Start: 2024-10-14

## 2024-10-14 RX ORDER — CEFADROXIL 500 MG/1
500 CAPSULE ORAL EVERY 12 HOURS SCHEDULED
Qty: 10 CAPSULE | Refills: 0 | Status: SHIPPED | OUTPATIENT
Start: 2024-10-14 | End: 2024-10-19

## 2024-10-14 RX ORDER — KETAMINE HCL IN NACL, ISO-OSM 100MG/10ML
SYRINGE (ML) INJECTION AS NEEDED
Status: DISCONTINUED | OUTPATIENT
Start: 2024-10-14 | End: 2024-10-14

## 2024-10-14 RX ORDER — PHENYLEPHRINE HCL IN 0.9% NACL 1 MG/10 ML
SYRINGE (ML) INTRAVENOUS AS NEEDED
Status: DISCONTINUED | OUTPATIENT
Start: 2024-10-14 | End: 2024-10-14

## 2024-10-14 RX ORDER — CHLORHEXIDINE GLUCONATE ORAL RINSE 1.2 MG/ML
15 SOLUTION DENTAL ONCE
Status: COMPLETED | OUTPATIENT
Start: 2024-10-14 | End: 2024-10-14

## 2024-10-14 RX ORDER — ACETAMINOPHEN 325 MG/1
975 TABLET ORAL ONCE
Status: COMPLETED | OUTPATIENT
Start: 2024-10-14 | End: 2024-10-14

## 2024-10-14 RX ORDER — CELECOXIB 200 MG/1
200 CAPSULE ORAL 2 TIMES DAILY
Qty: 60 CAPSULE | Refills: 0 | Status: SHIPPED | OUTPATIENT
Start: 2024-10-14

## 2024-10-14 RX ORDER — DOCUSATE SODIUM 100 MG/1
100 CAPSULE, LIQUID FILLED ORAL 2 TIMES DAILY
Status: DISCONTINUED | OUTPATIENT
Start: 2024-10-14 | End: 2024-10-14 | Stop reason: HOSPADM

## 2024-10-14 RX ORDER — SODIUM CHLORIDE, SODIUM LACTATE, POTASSIUM CHLORIDE, CALCIUM CHLORIDE 600; 310; 30; 20 MG/100ML; MG/100ML; MG/100ML; MG/100ML
125 INJECTION, SOLUTION INTRAVENOUS CONTINUOUS
Status: DISCONTINUED | OUTPATIENT
Start: 2024-10-14 | End: 2024-10-14 | Stop reason: HOSPADM

## 2024-10-14 RX ORDER — TRAZODONE HYDROCHLORIDE 100 MG/1
100 TABLET ORAL
Status: CANCELLED | OUTPATIENT
Start: 2024-10-14

## 2024-10-14 RX ORDER — OXYCODONE HYDROCHLORIDE 10 MG/1
10 TABLET ORAL EVERY 4 HOURS PRN
Status: DISCONTINUED | OUTPATIENT
Start: 2024-10-14 | End: 2024-10-14 | Stop reason: HOSPADM

## 2024-10-14 RX ORDER — ONDANSETRON 2 MG/ML
INJECTION INTRAMUSCULAR; INTRAVENOUS AS NEEDED
Status: DISCONTINUED | OUTPATIENT
Start: 2024-10-14 | End: 2024-10-14

## 2024-10-14 RX ORDER — CEFAZOLIN SODIUM 2 G/50ML
2000 SOLUTION INTRAVENOUS EVERY 8 HOURS
Status: DISCONTINUED | OUTPATIENT
Start: 2024-10-14 | End: 2024-10-14 | Stop reason: HOSPADM

## 2024-10-14 RX ORDER — AMOXICILLIN 250 MG
1 CAPSULE ORAL DAILY
Qty: 30 TABLET | Refills: 0 | Status: SHIPPED | OUTPATIENT
Start: 2024-10-14

## 2024-10-14 RX ORDER — FENTANYL CITRATE 50 UG/ML
INJECTION, SOLUTION INTRAMUSCULAR; INTRAVENOUS AS NEEDED
Status: DISCONTINUED | OUTPATIENT
Start: 2024-10-14 | End: 2024-10-14

## 2024-10-14 RX ORDER — CHLORHEXIDINE GLUCONATE 40 MG/ML
SOLUTION TOPICAL DAILY PRN
Status: DISCONTINUED | OUTPATIENT
Start: 2024-10-14 | End: 2024-10-14 | Stop reason: HOSPADM

## 2024-10-14 RX ORDER — ACETAMINOPHEN 500 MG
1000 TABLET ORAL EVERY 8 HOURS
Qty: 60 TABLET | Refills: 0 | Status: SHIPPED | OUTPATIENT
Start: 2024-10-14

## 2024-10-14 RX ORDER — OXYCODONE HYDROCHLORIDE 5 MG/1
5 TABLET ORAL EVERY 4 HOURS PRN
Qty: 42 TABLET | Refills: 0 | Status: SHIPPED | OUTPATIENT
Start: 2024-10-14 | End: 2024-10-24

## 2024-10-14 RX ORDER — ONDANSETRON 2 MG/ML
4 INJECTION INTRAMUSCULAR; INTRAVENOUS ONCE AS NEEDED
Status: COMPLETED | OUTPATIENT
Start: 2024-10-14 | End: 2024-10-14

## 2024-10-14 RX ORDER — SENNOSIDES 8.6 MG
1 TABLET ORAL DAILY
Status: DISCONTINUED | OUTPATIENT
Start: 2024-10-14 | End: 2024-10-14 | Stop reason: HOSPADM

## 2024-10-14 RX ORDER — METOCLOPRAMIDE HYDROCHLORIDE 5 MG/ML
10 INJECTION INTRAMUSCULAR; INTRAVENOUS ONCE AS NEEDED
Status: DISCONTINUED | OUTPATIENT
Start: 2024-10-14 | End: 2024-10-14 | Stop reason: HOSPADM

## 2024-10-14 RX ORDER — MAGNESIUM HYDROXIDE 1200 MG/15ML
LIQUID ORAL AS NEEDED
Status: DISCONTINUED | OUTPATIENT
Start: 2024-10-14 | End: 2024-10-14 | Stop reason: HOSPADM

## 2024-10-14 RX ORDER — BUPIVACAINE HYDROCHLORIDE AND EPINEPHRINE 2.5; 5 MG/ML; UG/ML
INJECTION, SOLUTION EPIDURAL; INFILTRATION; INTRACAUDAL; PERINEURAL AS NEEDED
Status: DISCONTINUED | OUTPATIENT
Start: 2024-10-14 | End: 2024-10-14 | Stop reason: HOSPADM

## 2024-10-14 RX ORDER — AMLODIPINE BESYLATE 10 MG/1
10 TABLET ORAL DAILY
Status: CANCELLED | OUTPATIENT
Start: 2024-10-14

## 2024-10-14 RX ORDER — BUPIVACAINE HYDROCHLORIDE 5 MG/ML
INJECTION, SOLUTION EPIDURAL; INTRACAUDAL
Status: COMPLETED | OUTPATIENT
Start: 2024-10-14 | End: 2024-10-14

## 2024-10-14 RX ORDER — CALCIUM CARBONATE 500 MG/1
1000 TABLET, CHEWABLE ORAL DAILY PRN
Status: DISCONTINUED | OUTPATIENT
Start: 2024-10-14 | End: 2024-10-14 | Stop reason: HOSPADM

## 2024-10-14 RX ORDER — HYDROMORPHONE HCL/PF 1 MG/ML
0.5 SYRINGE (ML) INJECTION
Status: DISCONTINUED | OUTPATIENT
Start: 2024-10-14 | End: 2024-10-14 | Stop reason: HOSPADM

## 2024-10-14 RX ORDER — TRANEXAMIC ACID 10 MG/ML
1000 INJECTION, SOLUTION INTRAVENOUS ONCE
Status: COMPLETED | OUTPATIENT
Start: 2024-10-14 | End: 2024-10-14

## 2024-10-14 RX ORDER — SIMETHICONE 80 MG
80 TABLET,CHEWABLE ORAL 4 TIMES DAILY PRN
Status: DISCONTINUED | OUTPATIENT
Start: 2024-10-14 | End: 2024-10-14 | Stop reason: HOSPADM

## 2024-10-14 RX ORDER — LORATADINE 10 MG/1
10 TABLET ORAL DAILY
Status: CANCELLED | OUTPATIENT
Start: 2024-10-14

## 2024-10-14 RX ORDER — ONDANSETRON 4 MG/1
4 TABLET, ORALLY DISINTEGRATING ORAL EVERY 6 HOURS PRN
Qty: 20 TABLET | Refills: 0 | Status: SHIPPED | OUTPATIENT
Start: 2024-10-14

## 2024-10-14 RX ORDER — OXYCODONE HYDROCHLORIDE 5 MG/1
5 TABLET ORAL EVERY 4 HOURS PRN
Status: DISCONTINUED | OUTPATIENT
Start: 2024-10-14 | End: 2024-10-14 | Stop reason: HOSPADM

## 2024-10-14 RX ORDER — FOLIC ACID 1 MG/1
1 TABLET ORAL DAILY
Status: DISCONTINUED | OUTPATIENT
Start: 2024-10-14 | End: 2024-10-14 | Stop reason: HOSPADM

## 2024-10-14 RX ORDER — CEFAZOLIN SODIUM 2 G/50ML
2000 SOLUTION INTRAVENOUS ONCE
Status: COMPLETED | OUTPATIENT
Start: 2024-10-14 | End: 2024-10-14

## 2024-10-14 RX ORDER — PROPOFOL 10 MG/ML
INJECTION, EMULSION INTRAVENOUS CONTINUOUS PRN
Status: DISCONTINUED | OUTPATIENT
Start: 2024-10-14 | End: 2024-10-14

## 2024-10-14 RX ORDER — PANTOPRAZOLE SODIUM 40 MG/1
40 TABLET, DELAYED RELEASE ORAL
Status: DISCONTINUED | OUTPATIENT
Start: 2024-10-15 | End: 2024-10-14 | Stop reason: HOSPADM

## 2024-10-14 RX ORDER — ACETAMINOPHEN 325 MG/1
650 TABLET ORAL EVERY 4 HOURS PRN
Status: DISCONTINUED | OUTPATIENT
Start: 2024-10-14 | End: 2024-10-14 | Stop reason: HOSPADM

## 2024-10-14 RX ORDER — MIDAZOLAM HYDROCHLORIDE 2 MG/2ML
INJECTION, SOLUTION INTRAMUSCULAR; INTRAVENOUS AS NEEDED
Status: DISCONTINUED | OUTPATIENT
Start: 2024-10-14 | End: 2024-10-14

## 2024-10-14 RX ORDER — ACETAMINOPHEN 325 MG/1
975 TABLET ORAL EVERY 8 HOURS
Status: DISCONTINUED | OUTPATIENT
Start: 2024-10-14 | End: 2024-10-14 | Stop reason: HOSPADM

## 2024-10-14 RX ORDER — FENTANYL CITRATE/PF 50 MCG/ML
50 SYRINGE (ML) INJECTION
Status: COMPLETED | OUTPATIENT
Start: 2024-10-14 | End: 2024-10-14

## 2024-10-14 RX ORDER — GABAPENTIN 300 MG/1
300 CAPSULE ORAL
Status: DISCONTINUED | OUTPATIENT
Start: 2024-10-14 | End: 2024-10-14 | Stop reason: HOSPADM

## 2024-10-14 RX ORDER — PANTOPRAZOLE SODIUM 40 MG/1
40 TABLET, DELAYED RELEASE ORAL
Status: CANCELLED | OUTPATIENT
Start: 2024-10-15

## 2024-10-14 RX ORDER — ONDANSETRON 2 MG/ML
4 INJECTION INTRAMUSCULAR; INTRAVENOUS EVERY 6 HOURS PRN
Status: DISCONTINUED | OUTPATIENT
Start: 2024-10-14 | End: 2024-10-14 | Stop reason: HOSPADM

## 2024-10-14 RX ORDER — METHOCARBAMOL 500 MG/1
750 TABLET, FILM COATED ORAL EVERY 12 HOURS PRN
Status: CANCELLED | OUTPATIENT
Start: 2024-10-14

## 2024-10-14 RX ORDER — LURASIDONE HYDROCHLORIDE 40 MG/1
40 TABLET, FILM COATED ORAL
Status: CANCELLED | OUTPATIENT
Start: 2024-10-14

## 2024-10-14 RX ORDER — SODIUM CHLORIDE, SODIUM LACTATE, POTASSIUM CHLORIDE, CALCIUM CHLORIDE 600; 310; 30; 20 MG/100ML; MG/100ML; MG/100ML; MG/100ML
100 INJECTION, SOLUTION INTRAVENOUS CONTINUOUS
Status: DISCONTINUED | OUTPATIENT
Start: 2024-10-14 | End: 2024-10-14 | Stop reason: HOSPADM

## 2024-10-14 RX ORDER — HYDROXYZINE HYDROCHLORIDE 10 MG/1
10 TABLET, FILM COATED ORAL
Status: CANCELLED | OUTPATIENT
Start: 2024-10-14

## 2024-10-14 RX ORDER — ASCORBIC ACID 500 MG
500 TABLET ORAL 2 TIMES DAILY
Status: DISCONTINUED | OUTPATIENT
Start: 2024-10-14 | End: 2024-10-14 | Stop reason: HOSPADM

## 2024-10-14 RX ADMIN — SODIUM CHLORIDE, SODIUM LACTATE, POTASSIUM CHLORIDE, AND CALCIUM CHLORIDE 125 ML/HR: .6; .31; .03; .02 INJECTION, SOLUTION INTRAVENOUS at 10:44

## 2024-10-14 RX ADMIN — Medication 20 MG: at 12:52

## 2024-10-14 RX ADMIN — Medication 100 MCG: at 13:07

## 2024-10-14 RX ADMIN — CEFAZOLIN SODIUM 2000 MG: 2 SOLUTION INTRAVENOUS at 12:48

## 2024-10-14 RX ADMIN — ONDANSETRON 4 MG: 2 INJECTION INTRAMUSCULAR; INTRAVENOUS at 14:38

## 2024-10-14 RX ADMIN — FENTANYL CITRATE 50 MCG: 50 INJECTION INTRAMUSCULAR; INTRAVENOUS at 13:51

## 2024-10-14 RX ADMIN — CHLORHEXIDINE GLUCONATE 15 ML: 1.2 RINSE ORAL at 10:46

## 2024-10-14 RX ADMIN — BUPIVACAINE HYDROCHLORIDE 7 ML: 5 INJECTION, SOLUTION EPIDURAL; INTRACAUDAL; PERINEURAL at 12:05

## 2024-10-14 RX ADMIN — MEPIVACAINE HYDROCHLORIDE 3 ML: 15 INJECTION, SOLUTION EPIDURAL; INFILTRATION at 12:47

## 2024-10-14 RX ADMIN — TRANEXAMIC ACID 1000 MG: 10 INJECTION, SOLUTION INTRAVENOUS at 12:49

## 2024-10-14 RX ADMIN — FENTANYL CITRATE 50 MCG: 50 INJECTION INTRAMUSCULAR; INTRAVENOUS at 14:35

## 2024-10-14 RX ADMIN — PROPOFOL 90 MCG/KG/MIN: 10 INJECTION, EMULSION INTRAVENOUS at 12:50

## 2024-10-14 RX ADMIN — MIDAZOLAM 2 MG: 1 INJECTION INTRAMUSCULAR; INTRAVENOUS at 12:04

## 2024-10-14 RX ADMIN — MIDAZOLAM 2 MG: 1 INJECTION INTRAMUSCULAR; INTRAVENOUS at 12:39

## 2024-10-14 RX ADMIN — Medication 30 MG: at 12:43

## 2024-10-14 RX ADMIN — FENTANYL CITRATE 50 MCG: 50 INJECTION INTRAMUSCULAR; INTRAVENOUS at 12:42

## 2024-10-14 RX ADMIN — FENTANYL CITRATE 50 MCG: 50 INJECTION INTRAMUSCULAR; INTRAVENOUS at 14:26

## 2024-10-14 RX ADMIN — ONDANSETRON 4 MG: 2 INJECTION INTRAMUSCULAR; INTRAVENOUS at 12:39

## 2024-10-14 RX ADMIN — DEXAMETHASONE SODIUM PHOSPHATE 10 MG: 10 INJECTION INTRAMUSCULAR; INTRAVENOUS at 13:08

## 2024-10-14 RX ADMIN — ACETAMINOPHEN 325MG 975 MG: 325 TABLET ORAL at 10:45

## 2024-10-14 RX ADMIN — BUPIVACAINE 20 ML: 13.3 INJECTION, SUSPENSION, LIPOSOMAL INFILTRATION at 12:05

## 2024-10-14 RX ADMIN — PHENYLEPHRINE HYDROCHLORIDE 40 MCG/MIN: 10 INJECTION INTRAVENOUS at 13:07

## 2024-10-14 RX ADMIN — MIDAZOLAM 2 MG: 1 INJECTION INTRAMUSCULAR; INTRAVENOUS at 12:01

## 2024-10-14 RX ADMIN — HYDROMORPHONE HYDROCHLORIDE 0.5 MG: 1 INJECTION, SOLUTION INTRAMUSCULAR; INTRAVENOUS; SUBCUTANEOUS at 15:00

## 2024-10-14 NOTE — PLAN OF CARE
Problem: PHYSICAL THERAPY ADULT  Goal: Performs mobility at highest level of function for planned discharge setting.  See evaluation for individualized goals.  Description: Treatment/Interventions: Elevations, Functional transfer training, LE strengthening/ROM, Therapeutic exercise, Endurance training, Patient/family training, Bed mobility, Gait training, Spoke to nursing, OT  Equipment Recommended: Walker (pt owns)       See flowsheet documentation for full assessment, interventions and recommendations.  Note: Prognosis: Good  Problem List: Decreased range of motion, Decreased strength, Decreased endurance, Impaired balance, Decreased mobility, Pain  Assessment: Pt. 54 y.o.female presents for elective surgery. Past medical hx includes bipolar disorder, HTN, use of marijuana, HTN, hx of seizures. Pt admitted for Primary osteoarthritis of left knee w/ Primary osteoarthritis of left knee (M17.12). S/p L elective TKR POD #0. Pt referred to PT for functional mobility evaluation & D/C planning w/ orders of up w/ assistance. WBAT LLE. PTA, pt reports being I w/ RW. Personal factors affecting pt at time of IE include: dec caregiver support, lives alone, and use of AD . During evaluation, deficits included dec mobility, balance, ambulation. Required S for supine to sit, sit<>stand, toilet transfer, and ambulation. Use of standard toilet and grab bars. Pt able to ambulate 40'x2 with RW in unit. Antalgic step to gait with no gross LOB noted. Pt demonstrated dec endurance and tolerance to activity. Denies reports of dizziness or SOB t/o session. Pt was educated on fall precautions and reinforced w/ good understanding. Pt would benefit from continued PT to address deficits as defined above and maximize level of independence with functional mobility and safety. Based on pt presentation and impaired function, pt would benefit from level III, (minimum resource intensity) at D/C. The patient's AM-PAC Basic Mobility Inpatient  Short Form Raw Score is 22. A Raw score of greater than 16 suggests the patient may benefit from discharge to home. Please also refer to the recommendation of the Physical Therapist for safe discharge planning. Nsg staff to continue to mobilized pt (OOB in chair for all meals & ambulate in room/unit) as tolerated to prevent further decline in function. Nsg notified. Co-eval performed to complete this PT evaluation for the pts best interest given pts medical complexity and functional level.  Barriers to Discharge: None     Rehab Resource Intensity Level, PT: III (Minimum Resource Intensity)    See flowsheet documentation for full assessment.

## 2024-10-14 NOTE — PHYSICAL THERAPY NOTE
PT EVALUATION    Pt. Name: Neda Anna  Pt. Age: 54 y.o.  MRN: [id number]  LENGTH OF STAY: 0    Patient Active Problem List   Diagnosis    Bipolar disorder (HCC)    Gastroesophageal reflux disease without esophagitis    Hypertension    Low serum vitamin D    Lumbar degenerative disc disease    Migraines    Obesity (BMI 30.0-34.9)    Primary osteoarthritis of left knee    Primary osteoarthritis of both knees    Sacroiliitis (HCC)    Piriformis muscle pain    Idiopathic urticaria    Dizziness    Metabolic syndrome    Posttraumatic stress disorder    Thyroid nodule    Recurrent major depressive disorder, in full remission (HCC)    Mid back pain    Suspected sleep apnea    Chronic pain syndrome    Chronic bilateral low back pain without sciatica    Myofascial pain syndrome    At risk for sleep apnea    History of syphilis    Rash and nonspecific skin eruption    Marijuana use    Prediabetes       Admitting Diagnoses:   Primary osteoarthritis of left knee [M17.12]    Past Medical History:   Diagnosis Date    Anxiety     Arthritis     Bipolar 1 disorder (HCC)     Chronic neck and back pain     Class 1 obesity due to excess calories with serious comorbidity and body mass index (BMI) of 32.0 to 32.9 in adult 03/13/2020    Depression     Dizziness     GERD (gastroesophageal reflux disease)     Headache     Headache(784.0)     History of transfusion     Hypertension     Joint pain     and swelling    Night sweats     Obesity     Osteoarthritis     PTSD (post-traumatic stress disorder)     Seizures (HCC)        Past Surgical History:   Procedure Laterality Date    COLONOSCOPY      FACIAL FRACTURE SURGERY      FRACTURE SURGERY      KNEE ARTHROSCOPY W/ MENISCECTOMY Left     ORTHOPEDIC SURGERY      TENDON REPAIR         Imaging Studies:  XR knee left 1 or 2 views    (Results Pending)        10/14/24 1603   PT Last Visit   PT Visit Date 10/14/24   Note Type   Note type Evaluation   Pain Assessment   Pain Assessment Tool  0-10   Pain Score 4   Pain Location/Orientation Orientation: Left;Location: Knee   Hospital Pain Intervention(s) Repositioned;Ambulation/increased activity;Elevated;Emotional support;Rest   Restrictions/Precautions   Weight Bearing Precautions Per Order Yes   LLE Weight Bearing Per Order WBAT   Other Precautions Fall Risk;Pain;Agitated   Home Living   Type of Home Apartment   Home Layout Elevator;One level;Able to live on main level with bedroom/bathroom;Performs ADLs on one level  (0 [place])   Bathroom Shower/Tub Tub/shower unit   Bathroom Toilet Raised   Bathroom Equipment Grab bars in shower;Grab bars around toilet   Home Equipment Walker;Cane;Other (Comment)  (RW and rollator)   Prior Function   Level of [place] Independent with ADLs;Independent with functional mobility;Independent with IADLS  (w/ rollator in community)   Lives With Alone   Receives Help From Family   IADLs Independent with meal prep;Independent with medication management;Family/Friend/Other provides transportation  (use of [employer] van)   Falls in the last 6 months 0   Vocational On disability   Comments Daughter and aunt will be staying with pt at D/C   General   Family/Caregiver Present Yes   Cognition   Overall Cognitive Status WFL   Arousal/Participation Alert   Orientation Level Oriented X4   Following Commands Follows one step commands without difficulty   Comments Agitated inconsistently throughout session   Subjective   Subjective I just want to stand, I already answered these questions.   RUE Assessment   RUE Assessment   (refer to OT)   LUE Assessment   LUE Assessment   (refer to OT)   RLE Assessment   RLE Assessment WFL  (4/5 grossly)   LLE Assessment   LLE Assessment X   Strength LLE   L Knee Flexion 3/5   L Knee Extension 3/5   L Ankle Dorsiflexion 4-/5   L Ankle Plantar Flexion 4-/5   Light Touch   RLE Light Touch Grossly intact   LLE Light Touch Grossly intact   Bed Mobility   Supine to Sit 5  Supervision   Additional items  Increased time required;Verbal cues;HOB elevated   Additional Comments Pt greeted in supine.   Transfers   Sit to Stand 5  Supervision   Additional items Increased time required;Verbal cues  (w/ RW)   Stand to Sit 5  Supervision   Additional items Increased time required;Verbal cues  (w/ RW)   Toilet transfer 5  Supervision   Additional items Increased time required;Verbal cues;Standard toilet  (w/ grab bars and RW)   Additional Comments cues for hand placement   Ambulation/Elevation   Gait pattern Improper Weight shift;Antalgic;Decreased foot clearance;Decreased L stance;Short stride;Step through pattern   Gait Assistance 5  Supervision   Additional items Verbal cues   Assistive Device Rolling walker   Distance 40'x2   Balance   Static Sitting Good   Dynamic Sitting Fair +   Static Standing Fair  (w/ RW)   Dynamic Standing Fair -  (w/ RW)   Ambulatory Fair -  (w/ RW)   Activity Tolerance   Activity Tolerance Patient tolerated treatment well   Medical Staff Made Aware OT Eunice   Nurse Made Aware RN Kelly   Assessment   Prognosis Good   Problem List Decreased range of motion;Decreased strength;Decreased endurance;Impaired balance;Decreased mobility;Pain   Assessment Pt. 54 y.o.female presents for elective surgery. Past medical hx includes bipolar disorder, HTN, use of marijuana, HTN, hx of seizures. Pt admitted for Primary osteoarthritis of left knee w/ Primary osteoarthritis of left knee (M17.12). S/p L elective TKR POD #0. Pt referred to PT for functional mobility evaluation & D/C planning w/ orders of up w/ assistance. WBAT LLE. PTA, pt reports being I w/ RW. Personal factors affecting pt at time of IE include: dec caregiver support, lives alone, and use of AD . During evaluation, deficits included dec mobility, balance, ambulation. Required S for supine to sit, sit<>stand, toilet transfer, and ambulation. Use of standard toilet and grab bars. Pt able to ambulate 40'x2 with RW in unit. Antalgic step to gait with no  gross LOB noted. Pt demonstrated dec endurance and tolerance to activity. Denies reports of dizziness or SOB t/o session. Pt was educated on fall precautions and reinforced w/ good understanding. Pt would benefit from continued PT to address deficits as defined above and maximize level of independence with functional mobility and safety. Based on pt presentation and impaired function, pt would benefit from level III, (minimum resource intensity) at D/C. The patient's AM-PAC Basic Mobility Inpatient Short Form Raw Score is 22. A Raw score of greater than 16 suggests the patient may benefit from discharge to home. Please also refer to the recommendation of the Physical Therapist for safe discharge planning. Nsg staff to continue to mobilized pt (OOB in chair for all meals & ambulate in room/unit) as tolerated to prevent further decline in function. Nsg notified. Co-eval performed to complete this PT evaluation for the pts best interest given pts medical complexity and functional level.   Barriers to Discharge None   Goals   Patient Goals to go home   STG Expiration Date 10/21/24   Short Term Goal #1 1) Inc overall LE strength by 1/2 MMT grade to improve functional mobility; 2) Pt will demonstrate improved bed mobility with mod I to dec caregiver burden; 3) Pt will demonstrate improved transfers w/ mod I for inc safety; 4) Pt will be able to amb w/ mod I >150' w/ RW for household distances to inc safety and dec caregiver burden; 5) Improve general balance by 1 grade to inc safety; 6) PT for ongoing patient and caregiver education   PT Treatment Day 0   Plan   Treatment/Interventions Elevations;Functional transfer training;LE strengthening/ROM;Therapeutic exercise;Endurance training;Patient/family training;Bed mobility;Gait training;Spoke to nursing;OT   PT Frequency Twice a day  (PRN)   Discharge Recommendation   Rehab Resource Intensity Level, PT III (Minimum Resource Intensity)   Equipment Recommended Walker  (pt  owns)   AM-PAC Basic Mobility Inpatient   Turning in Flat Bed Without Bedrails 4   Lying on Back to Sitting on Edge of Flat Bed Without Bedrails 4   Moving Bed to Chair 4   Standing Up From Chair Using Arms 4   Walk in Room 3   Climb 3-5 Stairs With Railing 3   Basic Mobility Inpatient Raw Score 22   Basic Mobility Standardized Score 47.4   [facility] Highest Level Of Mobility   -HLM Goal 7: Walk 25 feet or more   JH-HLM Achieved 7: Walk 25 feet or more   End of Consult   Patient Position at End of Consult Seated edge of bed   End of Consult Comments Pt with OT at end of session.     Hx/personal factors: co-morbidities, dec caregiver support, home alone, use of AD, pain, and fall risk, coping styles, social background, past experience, behavior pattern  Examination: dec mobility, dec balance, dec endurance, dec amb, risk for falls, pain, assessed body system, balance, endurance, amb, D/C disposition & fall risk, impairements in locomotion, musculoskeletal, balance, endurance, posture, coordination, assessed cognition, impairments in systems including multiple body structures involved; musculoskeletal (ROM, strength, posture, BMI), neuromuscular (balance,locomotion, gait, transfers, motor control and learning, sensation), joint integrity, integumentary (skin integrity, presence of scars or wounds), cardiopulmonary (vitals, edema); activity limitations (difficulties executing an action); participation restrictions (problems associated w involvement in life situations)  Clinical: unpredictable (ongoing medical status, risk for falls, imaging test/result pending, POD #0, and pain mgt)  Complexity: high      Kerri Jasmine, PT

## 2024-10-14 NOTE — ANESTHESIA PREPROCEDURE EVALUATION
Procedure:  ARTHROPLASTY KNEE TOTAL (Left: Knee)    Relevant Problems   CARDIO   (+) Hypertension   (+) Migraines      GI/HEPATIC   (+) Gastroesophageal reflux disease without esophagitis      MUSCULOSKELETAL   (+) Chronic bilateral low back pain without sciatica   (+) Lumbar degenerative disc disease   (+) Mid back pain   (+) Myofascial pain syndrome   (+) Primary osteoarthritis of both knees   (+) Primary osteoarthritis of left knee      NEURO/PSYCH   (+) Chronic bilateral low back pain without sciatica   (+) Chronic pain syndrome   (+) Migraines   (+) Myofascial pain syndrome   (+) Posttraumatic stress disorder   (+) Recurrent major depressive disorder, in full remission (HCC)        Physical Exam    Airway    Mallampati score: II  TM Distance: >3 FB  Neck ROM: full     Dental       Cardiovascular      Pulmonary      Other Findings  post-pubertal.      Anesthesia Plan  ASA Score- 2     Anesthesia Type- spinal with ASA Monitors.         Additional Monitors:     Airway Plan:     Comment: Plan for long acting adductor canal nerve block for postoperative analgesia discussed with risks/benefits/alternatives. Patient understands that this block is intended to be motor sparing, allowing for immediate postoperative physical therapy. Consequentially, the block is not expected to provide complete analgesia to the joint and is primarily intended to reduce postoperative opioid consumption. Surgeon to infiltrate posterior capsule in the field for additional analgesia.    Discussed plan for spinal anesthetic with risks/benefits/alternatives, including extremely low risk of spinal hematoma, infection, peripheral nerve damage, and paralysis. Patient aware of benefits including improved postoperative analgesia, decreased intraoperative bleeding, decreased intraoperative DVT risk, and avoidance of general anesthetic. Discussed possibility of general anesthesia as a back-up to neuraxial anesthetic given frequent concomitant  degenerative disease of the lumbar spine  .       Plan Factors-Exercise tolerance (METS): >4 METS.    Chart reviewed.   Existing labs reviewed. Patient summary reviewed.                  Induction- intravenous.    Postoperative Plan- Plan for postoperative opioid use.         Informed Consent- Anesthetic plan and risks discussed with patient.  I personally reviewed this patient with the CRNA. Discussed and agreed on the Anesthesia Plan with the CRNA..

## 2024-10-14 NOTE — INTERVAL H&P NOTE
H&P reviewed. After examining the patient I find no changes in the patients condition since the H&P had been written. Plan for left TKA.

## 2024-10-14 NOTE — ANESTHESIA PROCEDURE NOTES
Peripheral Block    Patient location during procedure: holding area  Start time: 10/14/2024 12:05 PM  Reason for block: at surgeon's request and post-op pain management  Staffing  Performed by: Mundo Quintanilla MD  Authorized by: Mundo Quintanilla MD    Preanesthetic Checklist  Completed: patient identified, IV checked, site marked, risks and benefits discussed, surgical consent, monitors and equipment checked, pre-op evaluation and timeout performed  Peripheral Block  Patient position: supine  Prep: ChloraPrep  Patient monitoring: frequent blood pressure checks, continuous pulse oximetry and heart rate  Block type: Adductor Canal  Laterality: left  Injection technique: single-shot  Procedures: ultrasound guided, Ultrasound guidance required for the procedure to increase accuracy and safety of medication placement and decrease risk of complications.  Ultrasound permanent image saved  bupivacaine (PF) (MARCAINE) 0.5 % injection 20 mL - Perineural   7 mL - 10/14/2024 12:05:00 PM  bupivacaine liposomal (EXPAREL) 1.3 % injection 20 mL - Perineural   20 mL - 10/14/2024 12:05:00 PM  Needle  Needle type: Stimuplex   Needle gauge: 20 G  Needle length: 4 in  Needle localization: anatomical landmarks and ultrasound guidance  Assessment  Injection assessment: incremental injection, frequent aspiration, injected with ease, negative aspiration, negative for heart rate change, no paresthesia on injection, no symptoms of intraneural/intravenous injection and needle tip visualized at all times  Paresthesia pain: none  Post-procedure:  site cleaned  patient tolerated the procedure well with no immediate complications

## 2024-10-14 NOTE — ANESTHESIA PROCEDURE NOTES
Spinal Block    Patient location during procedure: OR  Start time: 10/14/2024 12:47 PM  Reason for block: procedure for pain, at surgeon's request and primary anesthetic  Staffing  Performed by: Lisbeth Garcia CRNA  Authorized by: Mundo Quintanilla MD    Preanesthetic Checklist  Completed: patient identified, IV checked, site marked, risks and benefits discussed, surgical consent, monitors and equipment checked, pre-op evaluation and timeout performed  Spinal Block  Patient position: sitting  Prep: ChloraPrep and site prepped and draped  Patient monitoring: frequent blood pressure checks, continuous pulse ox and heart rate  Approach: midline  Location: L3-4  Needle  Needle type: Pencan   Needle gauge: 24 G  Needle length: 4 in  Assessment  Sensory level: T4  Injection Assessment:  negative aspiration for heme, no paresthesia on injection and positive aspiration for clear CSF.  Post-procedure:  site cleaned

## 2024-10-14 NOTE — ANESTHESIA POSTPROCEDURE EVALUATION
Post-Op Assessment Note    CV Status:  Stable  Pain Score: 0    Pain management: adequate       Mental Status:  Alert and awake   Hydration Status:  Euvolemic   PONV Controlled:  Controlled   Airway Patency:  Patent  Two or more mitigation strategies used for obstructive sleep apnea   Post Op Vitals Reviewed: Yes    No anethesia notable event occurred.    Staff: Anesthesiologist, CRNA           Last Filed PACU Vitals:  Vitals Value Taken Time   Temp 99    Pulse 97    /64    Resp 14    SpO2 94        Modified Corinna:  No data recorded

## 2024-10-14 NOTE — PLAN OF CARE
Problem: OCCUPATIONAL THERAPY ADULT  Goal: Performs self-care activities at highest level of function for planned discharge setting.  See evaluation for individualized goals.  Description: Treatment Interventions: ADL retraining, Functional transfer training, Endurance training, Patient/family training, Equipment evaluation/education, Compensatory technique education, Continued evaluation, Energy conservation, Activityengagement          See flowsheet documentation for full assessment, interventions and recommendations.   Note: Limitation: Decreased ADL status, Decreased endurance, Decreased self-care trans, Decreased high-level ADLs  Prognosis: Good  Assessment: Pt is a 54 y.o. female seen for OT evaluation s/p adm to [facility] on 10/14/2024 w/ Primary osteoarthritis of left knee . Comorbidities affecting pt’s functional performance include a significant PMH of anxiety, bipolar disorder, depression, HTN, PTSD, seizures, GERD, osteoarthritis. Pt with active OT orders and activity orders for Activity beginning POD #0. Pt lives in an apartment with elevator no [employer] alone. Family will be staying with her, tub/shower and raised toilets. At baseline, pt was independent with all ADLs/IADLs. Pt completed supine to sit with supervision. Sit to stand with RW and supervision. Pt completed functional mobility functional household distance with use of RW and supervision. Toilet transfer with supervision and use of grab bars and RW for stability. Toileting completed with supervision seated for perineal hygiene. Pt completed LB dressing seated EOB, don of underwear and pants with supervision, then standing to pull over waist with use of RW. Pt completed don of shirt with mod I. Educated pt on ADLs/IADLs, transfers and bathing for independence at home. Upon evaluation, pt currently requires mod I for UB ADLs, S for LB ADLs, S for toileting, S for bed mobility, S for functional mobility, and S for transfers 2* the following  deficits impacting occupational performance: weakness, decreased strength , decreased balance, decreased activity tolerance, increased pain, and orthopedic restrictions. These impairments, as well at pt’s personal factors of: difficulty performing ADLs, difficulty performing IADLs, difficulty performing transfers/mobility, WBS, fall risk , and new use of AD for functional transfers/mobility limit pt’s ability to safely engage in all baseline areas of occupation. Based on the aforementioned OT evaluation, functional performance deficits, and assessments, pt has been identified as a moderate complexity evaluation. Pt to continue to benefit from continued acute OT services during hospital stay to address defined deficits and to maximize level of functional independence in the following Occupational Performance areas: grooming, bathing/shower, toilet hygiene, dressing, health maintenance, functional mobility, community mobility, clothing management, cleaning, and household maintenance. From OT standpoint, recommend No post-acute rehabilitation needs upon D/C. OT will continue to follow pt 3-5x/wk.     Rehab Resource Intensity Level, OT: No post-acute rehabilitation needs

## 2024-10-14 NOTE — OCCUPATIONAL THERAPY NOTE
Occupational Therapy Evaluation     Patient Name: Neda Anna  Today's Date: 10/14/2024  Problem List  Principal Problem:    Primary osteoarthritis of left knee    Past Medical History  Past Medical History:   Diagnosis Date    Anxiety     Arthritis     Bipolar 1 disorder (HCC)     Chronic neck and back pain     Class 1 obesity due to excess calories with serious comorbidity and body mass index (BMI) of 32.0 to 32.9 in adult 03/13/2020    Depression     Dizziness     GERD (gastroesophageal reflux disease)     Headache     Headache(784.0)     History of transfusion     Hypertension     Joint pain     and swelling    Night sweats     Obesity     Osteoarthritis     PTSD (post-traumatic stress disorder)     Seizures (HCC)      Past Surgical History  Past Surgical History:   Procedure Laterality Date    COLONOSCOPY      FACIAL FRACTURE SURGERY      FRACTURE SURGERY      KNEE ARTHROSCOPY W/ MENISCECTOMY Left     ORTHOPEDIC SURGERY      TENDON REPAIR          10/14/24 1552   OT Last Visit   OT Visit Date 10/14/24   Note Type   Note type Evaluation   Additional Comments pt greeted supine in bed, agreeable to OT evaluation.   Pain Assessment   Pain Assessment Tool 0-10   Pain Score 4   Pain Location/Orientation Orientation: Left;Location: Knee   Hospital Pain Intervention(s) Repositioned;Ambulation/increased activity;Rest   Restrictions/Precautions   Weight Bearing Precautions Per Order Yes   LLE Weight Bearing Per Order WBAT   Other Precautions Agitated;WBS;Fall Risk;Pain   Home Living   Type of Home Apartment   Home Layout Elevator;One level;Performs ADLs on one level;Able to live on main level with bedroom/bathroom   Bathroom Shower/Tub Tub/shower unit   Bathroom Toilet Raised   Bathroom Equipment Grab bars in shower;Grab bars around toilet   Bathroom Accessibility Accessible   Home Equipment Walker;Cane;Other (Comment)  (rollator)   Prior Function   Level of [place] Independent with ADLs;Independent with  "functional mobility;Independent with IADLS   Lives With Alone   Receives Help From Family   IADLs Independent with meal prep;Independent with medication management;Family/Friend/Other provides transportation  (use of lanta van)   Falls in the last 6 months 0   Vocational On disability   Comments use of rollator in the community   Lifestyle   Autonomy Independent with all ADLs/IADLs, no AD use at baseline   Reciprocal Relationships Family   Service to Others On disability   General   Family/Caregiver Present Yes   Subjective   Subjective \"I already answered these questions\"   ADL   Where Assessed Edge of bed   Eating Assistance 6  Modified independent   Grooming Assistance 6  Modified Independent   UB Bathing Assistance 5  Supervision/Setup   LB Bathing Assistance 5  Supervision/Setup   UB Dressing Assistance 5  Supervision/Setup   LB Dressing Assistance 5  Supervision/Setup   Toileting Assistance  5  Supervision/Setup   Bed Mobility   Supine to Sit 5  Supervision   Additional items Increased time required;Verbal cues;HOB elevated   Sit to Supine Unable to assess   Additional Comments Pt greeted in supine, ended session seated EOB.   Transfers   Sit to Stand 5  Supervision   Additional items Increased time required;Verbal cues  (RW)   Stand to Sit 5  Supervision   Additional items Increased time required;Verbal cues  (RW)   Toilet transfer 5  Supervision   Additional items Increased time required;Verbal cues;Standard toilet;Other  (use of grab bars and RW)   Additional Comments verbal cues for hand placement   Functional Mobility   Functional Mobility 5  Supervision   Additional Comments Pt completed functional mobility functional household distance with use of RW and supervision   Additional items Rolling walker   Balance   Static Sitting Good   Dynamic Sitting Fair +   Static Standing Fair   Dynamic Standing Fair -   Ambulatory Fair -   Activity Tolerance   Activity Tolerance Patient tolerated treatment well "   Medical Staff Made Aware PT Kerri, Pt seen for co-evaluation/treatment with skilled Physical Therapy due to pt's medical complexity, decreased endurance, overall functional level, overall safety, and post surgical day #0.   Nurse Made Aware BRE MORENO Assessment   RUE Assessment WFL   LUE Assessment   LUE Assessment WFL   Hand Function   Gross Motor Coordination Functional   Fine Motor Coordination Functional   Cognition   Overall Cognitive Status WFL   Arousal/Participation Alert;Cooperative   Attention Within functional limits   Orientation Level Oriented X4   Memory Within functional limits   Following Commands Follows one step commands without difficulty   Comments Agitated inconsistenty throughout session, cooperative   Assessment   Limitation Decreased ADL status;Decreased endurance;Decreased self-care trans;Decreased high-level ADLs   Prognosis Good   Assessment Pt is a 54 y.o. female seen for OT evaluation s/p adm to [facility] on 10/14/2024 w/ Primary osteoarthritis of left knee . Comorbidities affecting pt’s functional performance include a significant PMH of anxiety, bipolar disorder, depression, HTN, PTSD, seizures, GERD, osteoarthritis. Pt with active OT orders and activity orders for Activity beginning POD #0. Pt lives in an apartment with elevator no [employer] alone. Family will be staying with her, tub/shower and raised toilets. At baseline, pt was independent with all ADLs/IADLs. Pt completed supine to sit with supervision. Sit to stand with RW and supervision. Pt completed functional mobility functional household distance with use of RW and supervision. Toilet transfer with supervision and use of grab bars and RW for stability. Toileting completed with supervision seated for perineal hygiene. Pt completed LB dressing seated EOB, don of underwear and pants with supervision, then standing to pull over waist with use of RW. Pt completed don of shirt with mod I. Educated pt on ADLs/IADLs,  transfers and bathing for independence at home. Upon evaluation, pt currently requires mod I for UB ADLs, S for LB ADLs, S for toileting, S for bed mobility, S for functional mobility, and S for transfers 2* the following deficits impacting occupational performance: weakness, decreased strength , decreased balance, decreased activity tolerance, increased pain, and orthopedic restrictions. These impairments, as well at pt’s personal factors of: difficulty performing ADLs, difficulty performing IADLs, difficulty performing transfers/mobility, WBS, fall risk , and new use of AD for functional transfers/mobility limit pt’s ability to safely engage in all baseline areas of occupation. Based on the aforementioned OT evaluation, functional performance deficits, and assessments, pt has been identified as a moderate complexity evaluation. Pt to continue to benefit from continued acute OT services during hospital stay to address defined deficits and to maximize level of functional independence in the following Occupational Performance areas: grooming, bathing/shower, toilet hygiene, dressing, health maintenance, functional mobility, community mobility, clothing management, cleaning, and household maintenance. From OT standpoint, recommend No post-acute rehabilitation needs upon D/C. OT will continue to follow pt 3-5x/wk.   Goals   Patient Goals to go home   STG Time Frame 1-3   Short Term Goal #1 Pt will improve activity tolerance to G for min 30 min treatment sessions for increase engagement in functional tasks   Short Term Goal #2 Pt will complete bed mobility at a mod I level w/ G balance/safety demonstrated to decrease caregiver assistance required   Short Term Goal  Pt will complete LB dressing/self care w/ mod I using adaptive device and DME as needed   LTG Time Frame 3-7   Long Term Goal #1 Pt will complete toileting w/ mod I w/ G hygiene/thoroughness using DME as needed   Long Term Goal #2 Pt will improve functional  transfers to mod I on/off all surfaces using DME as needed w/ G balance/safety   Long Term Goal Pt will improve functional mobility during ADL/IADL/leisure tasks to mod I using DME as needed w/ G balance/safety   Plan   Treatment Interventions ADL retraining;Functional transfer training;Endurance training;Patient/family training;Equipment evaluation/education;Compensatory technique education;Continued evaluation;Energy conservation;Activityengagement   Goal Expiration Date 10/21/24   OT Treatment Day 0   OT Frequency 3-5x/wk   Discharge Recommendation   Rehab Resource Intensity Level, OT No post-acute rehabilitation needs   Additional Comments  The patient's raw score on the AM-PAC Daily Activity Inpatient Short Form is 21 . A raw score of greater than or equal to 19 suggests the patient may benefit from discharge to home. Please refer to the recommendation of the Occupational Therapist for safe discharge planning.   AM-PAC Daily Activity Inpatient   Lower Body Dressing 3   Bathing 3   Toileting 3   Upper Body Dressing 4   Grooming 4   Eating 4   Daily Activity Raw Score 21   Daily Activity Standardized Score (Calc for Raw Score >=11) 44.27   AM-PAC Applied Cognition Inpatient   Following a Speech/Presentation 4   Understanding Ordinary Conversation 4   Taking Medications 4   Remembering Where Things Are Placed or Put Away 4   Remembering List of 4-5 Errands 4   Taking Care of Complicated Tasks 4   Applied Cognition Raw Score 24   Applied Cognition Standardized Score 62.21   End of Consult   Education Provided Yes;Family or social support of family present for education by provider   Patient Position at End of Consult Seated edge of bed;All needs within reach   Nurse Communication Nurse aware of consult   End of Consult Comments Pt seated EOB at end of session. Call bell and phone within reach. All needs met and pt reports no further questions for OT at this time.   Eunice Marcos, OT

## 2024-10-14 NOTE — OP NOTE
OPERATIVE REPORT  PATIENT NAME: Neda Anna  : 1970  MRN: [id number]  Pt Location:  [facility] OR ROOM 05    Surgery Date: 10/14/2024    Surgeons and Role:     * Michael Sue,  - Primary     * CAITLIN CalhounC - Assisting     * Evans Urrutia MD - Assisting      * Shelli Larose OT-C - Assisting     Preop Diagnosis:  Primary osteoarthritis of left knee [M17.12]    Post-Op Diagnosis Codes:     * Primary osteoarthritis of left knee [M17.12]    Procedure(s):  Left - ARTHROPLASTY KNEE TOTAL    Specimens:  * No specimens in log *    Estimated Blood Loss:   25 cc    Drains:  * No LDAs found *    Anesthesia Type:   Spinal     Operative Indications:  Primary osteoarthritis of left knee [M17.12]    54F with left knee pain 2/2 severe bone on bone osteoarthritis. She has failed extensive non-operative management over the past few years with HEP, PT, IA CSI/visco and low-impact exercises. She continues to be limited due the pain in the knee. She has significant difficulties getting around due the pain and is using a walker. The patient has elected to proceed with left TKA. Risks and benefits of surgery to include but not limited to bleeding, infection, damage to surrounding structures, hardware failure, instability, fracture, dislocation, need for further surgery, continued pain, stiffness, blood clots, stroke, and heart attack was discussed with the patient.     Operative Findings:  Severe tricompartmental osteoarthritis   Pre-op ROM 5-120  Post-op ROM 0-130+ calf to thigh gravity-assisted flexion    Implant Name Type Inv. Item Serial No.  Lot No. LRB No. Used Action   CEMENT BONE SMART SET GRAY MED VISC - [id number]  CEMENT BONE SMART SET JENKINS MED VISC  DEPUY [id number] Left 1 Implanted   INSERT TIB SZ 5 5MM LT FX BRNG MED STAB ATTUNE - [id number]  INSERT TIB SZ 5 5MM LT FX BRNG MED STAB ATTUNE  DEPUY [id number] Left 1 Implanted   COMPONENT PATELLA 35MM MEDIAL DOME ATTUNE - [id number]  COMPONENT PATELLA  35MM MEDIAL DOME ATTUNE  DEPUY [id number] Left 1 Implanted   COMPONENT FEM SZ 5 LT NRW  CR ATTUNE - [id number]  COMPONENT FEM SZ 5 LT NRW  CR ATTUNE  DEPUY [id number] Left 1 Implanted   BASEPLATE TIBIAL SZ 4 FX BRNG  ATTUNE - [id number]  BASEPLATE TIBIAL SZ 4 FX BRNG  ATTUNE  DEPUY [id number] Left 1 Implanted     Complications:   None    Knee Technique: Suture (direct) Repair  Knee Approach: Medial Parapatellar    Chronic Narcotic Use:  No      Procedure and Technique:  Patient was seen in the preoperative holding area.  Informed consent was confirmed and all questions were answered. Operative site was confirmed and marked. Patient was taken to the operating room and transferred to the operating room table. Anesthesia was performed. The patient was then placed supine and all bony prominences were well-padded. Left lower extremity was prepped and draped in usual sterile fashion with chlorhexidine scrub.  Patient was given perioperative antibiotics prior to incision and SCDs were placed on the non-operative leg.  A formal time-out was performed identifying the patient and confirmed operative site.  The knee was exsanguinated and a pneumatic tourniquet was inflated at 250 mmHg.  A slightly medial midline incision was performed from 3 fingerbreadths above the patella to the tibial tubercle.  This incision was carried down through skin and subcutaneous tissues to the level of the extensor mechanism.  A small medial skin flap was created.  A medial parapatellar approach was performed into the knee being careful to avoid the patellar tendon.  The anterior horn of the medial and lateral meniscus was released.  The medial peel was performed to the mid coronal line.  Lateral patellofemoral plica was excised and the patella was everted.  The fat pad was removed being careful to avoid the patellar tendon.  Peripheral osteophytes removed at this time as was the anterior cruciate ligament.  The intramedullary femoral drill was now used  just medial and anterior to the PCL insertion at the sulcus terminalis.  A drop chino was used to confirm intramedullary placement of the drill.  The distal femoral cutting jig was now inserted into the intramedullary canal set to 5° of valgus per pre-op template and 11 mm distal resection.  Distal resection was checked with an Robbie wing and deemed appropriate.  The distal femur was cut.  At this time the distal femoral cutting guide was removed and the sizing guide was placed on the distal femur.  A posterior referencing system was used and pins placed with 3° of external rotation.  The femur was sized to the above size.  The appropriate cutting block was then placed over the pins and rotation was confirmed being parallel to the epicondylar access and perpendicular to [place] line.  Retractors were placed to protect the collateral ligaments and the anterior, posterior, posterior chamfer, anterior chamfer was cut. The distal 5th cut was also performed.  Bone fragments were removed with curved osteotome and then posterior Hohmann was placed to sublux the tibia forward.  An extramedullary tibial guide was used veing cognizant of varus valgus alignment, slope and depth of resection.  The guide was pinned in place and then a drop chino was used to confirm appropriate alignment.  The tibia was cut and removed.  At this time the bilateral menisci and posterior osteophytes of the femur were resected with the use of a laminar .  Once adequate osteophytes were removed the knee was trialed with the above implants.  The knee was found to have excellent stability with a 5 mm MS insert.  At this time the patella was measured and resected to appropriate depth.  The patella sized to the above size and 3 drill holes were performed.  At this time the knee was again taken through range of motion and found to have excellent stability and excellent patellar tracking.  The trials were floated and rotation of tibia marked.  The  femoral lug drills were drilled.  The femur and trial poly were removed and posterior Hohmann placed to sublux the tibia forward.  The Press-Fit tibial base plate was then aligned on the cut surface of the tibia matching where the trial was floated at approximately the medial 1/3 the tibial tubercle.  The base plate was pinned in place and prep tower impacted.  The Boss Reamer for the Press-Fit tibia was used 1st followed by keel punch.  The peripheral holes were then drilled as well.  At this time all trials were removed and the knee was copiously irrigated with normal saline solution.  The Press-Fit base plate was impacted into place and assured to be flush in all corners.  The MS polyethylene was impacted into the clean tray. The posterior Hohmann was then removed.  The Press-Fit femoral component was impacted in place and assured to be flush on all bony surfaces.  The patella cut surface was dried and the domed patella was cemented into place and held with a clamp.  Peripheral cement was cleared and the clamp was held until cement cured.  The tourniquet was released at 26 minutes and all bleeders were coagulated.  The knee was irrigated with Irrisept solution and then the remainder of the 3 L of normal saline solution.  The joint local was injected in the posterior capsule and around the tissues of the knee.  The knee was taken through a final range of motion and found to have excellent stability and patellar tracking.  All instrument and sponge counts were correct x2.  The arthrotomy was closed with #1 Stratafix suture.  Subcutaneous tissues were closed with 2-0 Vicryl.  The skin was closed with 3-0 Stratafix followed by Dermabond.  A sterile Mepilex was placed along with a thigh foot Ace wrap.  Patient was awoken from anesthesia and taken to recovery room in stable condition.    54F s/p L TKA 10/14  - multi-modal pain control  - ancef x 24 hrs  - DVT ppx: aspirin 81mg BID x 4 weeks  - PT/OT  - WBAT  - ROM as  tolerated, pillow/blankets under achilles not behind knee while in bed  - f/u 10-14 days      I was present for all critical portions of the procedure. and A physician assistant was required during the procedure for retraction, tissue handling, dissection and suturing.    Patient Disposition:  PACU     SIGNATURE: Michael Sue DO  DATE: October 14, 2024  TIME: 1:53 PM

## 2024-10-15 ENCOUNTER — TELEPHONE (OUTPATIENT)
Dept: OBGYN CLINIC | Facility: HOSPITAL | Age: 54
End: 2024-10-15

## 2024-10-15 NOTE — TELEPHONE ENCOUNTER
"Patient contacted for a postoperative follow up assessment. Patient reports doing  \"I'm good, little sore.\" Patient states current pain level of a  6/10 and is walking with RW and just walked around her hallway. Patient denies increase in swelling, stating \"about the same\" and dressing is clean, dry and intact. Patient is icing the site and we discussed postoperative swelling, continuing to ICE areas of pain/swelling, especially after increased activity over the next few weeks.      We reviewed patients AVS medication list. Patient is taking Tylenol 1000mg every 8 hours, Oxycodone 5mg PRN, ASA 81mg BID,Duricef BID,  Senokot daily. Patient had a BM yesterday, and we discussed taking until going regularly. She reports she is using the Zofran As needed, due to nausea.     Patient denies nausea, vomiting, abdominal pain, chest pain, shortness of breath, fever, dizziness and calf pain. Patient does not have any other questions or concerns at this time. Pt was encouraged to call with any questions, concerns or issues.    "
Caller: patient    Doctor: Vikram     Reason for call: received VM, returning call     Call back#: [phone]  
 Symptoms

## 2024-10-17 ENCOUNTER — TELEPHONE (OUTPATIENT)
Age: 54
End: 2024-10-17

## 2024-10-17 NOTE — TELEPHONE ENCOUNTER
Spoke to patient, discussed movement at home/exercises until 10/29 appt with surgeon and would be discussed/ordered then.

## 2024-10-17 NOTE — TELEPHONE ENCOUNTER
Caller: Neda     Doctor: Vikram    Reason for call: Wants to now when she needs to start PT - Surgery was Monday 10/14/2024    Call back#:[phone]

## 2024-11-01 ENCOUNTER — TELEPHONE (OUTPATIENT)
Dept: OBGYN CLINIC | Facility: HOSPITAL | Age: 54
End: 2024-11-01

## 2024-11-01 NOTE — TELEPHONE ENCOUNTER
Hello,    Please advise if a forced appointment can be accommodated for the patient:    Call back #: [phone]    Insurance: [facility]    Reason for appointment: PO #1 sx 10/14    Requested doctor and/or location: Vikram/Chichi      Thank you.

## 2024-11-01 NOTE — TELEPHONE ENCOUNTER
I can see her on 11/5 either in the 11 oclock hour or the 1 oclock hour when Dr. Sue is at his meeting.  Thanks!

## 2024-11-04 NOTE — ANESTHESIA POSTPROCEDURE EVALUATION
Post-Op Assessment Note    CV Status:  Stable    Pain management: adequate       Mental Status:  Alert and awake   Hydration Status:  Euvolemic   PONV Controlled:  Controlled   Airway Patency:  Patent     Post Op Vitals Reviewed: Yes    No anethesia notable event occurred.    Staff: Anesthesiologist           Last Filed PACU Vitals:  Vitals Value Taken Time   Temp 97.6 °F (36.4 °C) 10/14/24 1445   Pulse 63 10/14/24 1500   /55 10/14/24 1500   Resp 19 10/14/24 1500   SpO2 95 % 10/14/24 1500       Modified Corinna:  No data recorded

## 2024-11-05 ENCOUNTER — APPOINTMENT (OUTPATIENT)
Age: 54
End: 2024-11-05
Payer: MEDICARE

## 2024-11-05 ENCOUNTER — OFFICE VISIT (OUTPATIENT)
Age: 54
End: 2024-11-05

## 2024-11-05 VITALS — WEIGHT: 164 LBS | BODY MASS INDEX: 30.18 KG/M2 | HEIGHT: 62 IN

## 2024-11-05 DIAGNOSIS — Z96.652 STATUS POST TOTAL KNEE REPLACEMENT, LEFT: Primary | ICD-10-CM

## 2024-11-05 PROCEDURE — 99024 POSTOP FOLLOW-UP VISIT: CPT | Performed by: STUDENT IN AN ORGANIZED HEALTH CARE EDUCATION/TRAINING PROGRAM

## 2024-11-05 PROCEDURE — 73562 X-RAY EXAM OF KNEE 3: CPT

## 2024-11-05 NOTE — PROGRESS NOTES
Subjective: Patient seen and examined. Pain well-controlled, she is only taking Tylenol and Celebrex for pain, she is no longer needed oxycodone. Progressing well, she ambulates with her walker only for long distances, otherwise she ambulates unassisted. (She was using a rollator pre-op). Incision without drainage, healing well. Denies fevers or chills. She is very pleased with her progress and is doing better than she would have imagined.     Physical Exam:  Incision: CDI, no erythema or drainage noted  ROM: 0-115 without pain  5/5 IP/Q/HS/TA/GS, 2+ DP/PT, SILT DP/SP/S/S/TN    XR Left knee: s/p press-fit CR attune TKA, components in good position. No fracture or dislocation.      Assessment/Plan:  55 y/o female 3 weeks s/p Left TKA from 10/14/24.    - continue multi-modal pain control   - Weight bearing status: WBAT LLE  - DVT ppx: aspirin 81mg twice daily x 4 weeks (1 more week)  - Incision care: May shower, do not scrub or submerge incision  - PT/OT, script for PT placed  - F/U 4 weeks      Scribe Attestation      I,:  Venessa Guzmán PA-C am acting as a scribe while in the presence of the attending physician.:       I,:  Michael Sue DO personally performed the services described in this documentation    as scribed in my presence.:

## 2024-11-12 ENCOUNTER — TELEPHONE (OUTPATIENT)
Dept: FAMILY MEDICINE CLINIC | Facility: CLINIC | Age: 54
End: 2024-11-12

## 2024-11-12 DIAGNOSIS — I10 BENIGN HYPERTENSION: ICD-10-CM

## 2024-11-12 DIAGNOSIS — F51.01 PRIMARY INSOMNIA: ICD-10-CM

## 2024-11-12 DIAGNOSIS — N95.1 MENOPAUSAL SYMPTOMS: ICD-10-CM

## 2024-11-12 DIAGNOSIS — K21.9 GASTROESOPHAGEAL REFLUX DISEASE WITHOUT ESOPHAGITIS: ICD-10-CM

## 2024-11-12 NOTE — TELEPHONE ENCOUNTER
Pt came into office requesting medication refill for     estradiol (Melissa) 0.0375 MG/24HR   amLODIPine (NORVASC) 10 mg  omeprazole (PriLOSEC) 40 MG   traZODone (DESYREL) 100 mg       Pt had an appointment today but the Van did not pick her up. Rescheduled appointment for 11/19/24  @ 3:20pm      Please send to pharmacy on file

## 2024-11-12 NOTE — TELEPHONE ENCOUNTER
Pt called and left a VM to reschedule her appointmnt for today because the [facility] did not pick her up.      Called pt and rescheduled her appointment for her 11/19/24 @ 3:20pm

## 2024-11-14 ENCOUNTER — TELEPHONE (OUTPATIENT)
Age: 54
End: 2024-11-14

## 2024-11-14 RX ORDER — ESTRADIOL 0.04 MG/D
1 PATCH, EXTENDED RELEASE TRANSDERMAL 2 TIMES WEEKLY
Qty: 8 PATCH | Refills: 11 | OUTPATIENT
Start: 2024-11-14

## 2024-11-14 RX ORDER — TRAZODONE HYDROCHLORIDE 100 MG/1
100 TABLET ORAL
Qty: 60 TABLET | Refills: 0 | Status: SHIPPED | OUTPATIENT
Start: 2024-11-14

## 2024-11-14 RX ORDER — OMEPRAZOLE 40 MG/1
40 CAPSULE, DELAYED RELEASE ORAL DAILY
Qty: 30 CAPSULE | Refills: 1 | Status: SHIPPED | OUTPATIENT
Start: 2024-11-14

## 2024-11-14 RX ORDER — AMLODIPINE BESYLATE 10 MG/1
10 TABLET ORAL DAILY
Qty: 90 TABLET | Refills: 1 | Status: SHIPPED | OUTPATIENT
Start: 2024-11-14

## 2024-11-14 NOTE — TELEPHONE ENCOUNTER
She cannot start pool/aquatherapy until after we see her at her next visit to make sure that her incision is well healed.  She should not be her submerging her incision yet.  Thanks!

## 2024-11-14 NOTE — TELEPHONE ENCOUNTER
Caller: Patient     Doctor: Vikram     Reason for call: [facility] is [facility] patient would like a script stating she can use [facility]     Please call patient once in she will provide a fax number

## 2024-12-10 ENCOUNTER — TELEPHONE (OUTPATIENT)
Age: 54
End: 2024-12-10

## 2024-12-10 NOTE — TELEPHONE ENCOUNTER
She is about 2 months post op, as long as her incision is completely healed, she may submerge her incision and do water therapy.

## 2024-12-10 NOTE — TELEPHONE ENCOUNTER
Hello,    Please advise if a forced appointment can be accommodated for the patient: Neda    Call back #: [phone]    Insurance: [employer] [facility]    Reason for appointment: 4 week post op nothing available till Feb.    Requested doctor and/or location: Vikram / Farrah      Thank you.

## 2024-12-10 NOTE — TELEPHONE ENCOUNTER
Caller: Neda    Doctor: Vikram    Reason for call: Patient is asking when she is allowed to go back in the pool for therapy.  Please advise patient  Thank you    Call back#: [phone]

## 2024-12-10 NOTE — LETTER
December 12, 2024     Patient: Neda Anna  YOB: 1970  Date of Visit: 12/10/2024      To Whom it May Concern:    Neda Anna is under my professional care. Neda may return to aquatherapy in the pool and may submerge her incision.    If you have any questions or concerns, please don't hesitate to call.         Sincerely,        Dr. Michael Sue D.O.        CC: No Recipients

## 2024-12-12 ENCOUNTER — OFFICE VISIT (OUTPATIENT)
Dept: OTOLARYNGOLOGY | Facility: CLINIC | Age: 54
End: 2024-12-12
Payer: MEDICARE

## 2024-12-12 DIAGNOSIS — J34.2 DNS (DEVIATED NASAL SEPTUM): ICD-10-CM

## 2024-12-12 DIAGNOSIS — R09.81 NASAL CONGESTION: ICD-10-CM

## 2024-12-12 DIAGNOSIS — Z87.828 HISTORY OF FACIAL TRAUMA: ICD-10-CM

## 2024-12-12 DIAGNOSIS — Z98.890 HISTORY OF NASAL SURGERY: ICD-10-CM

## 2024-12-12 DIAGNOSIS — S09.92XS NASAL TRAUMA, SEQUELA: Primary | ICD-10-CM

## 2024-12-12 DIAGNOSIS — J32.4 CHRONIC PANSINUSITIS: ICD-10-CM

## 2024-12-12 DIAGNOSIS — M95.0 ACQUIRED NASAL DEFORMITY: ICD-10-CM

## 2024-12-12 PROCEDURE — 99203 OFFICE O/P NEW LOW 30 MIN: CPT | Performed by: OTOLARYNGOLOGY

## 2024-12-12 PROCEDURE — 31231 NASAL ENDOSCOPY DX: CPT | Performed by: OTOLARYNGOLOGY

## 2024-12-12 NOTE — TELEPHONE ENCOUNTER
Called and spoke to patient PT would like a note to let them know that its ok to go back to pool therapy
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
## 2024-12-13 ENCOUNTER — TELEPHONE (OUTPATIENT)
Dept: OBGYN CLINIC | Facility: HOSPITAL | Age: 54
End: 2024-12-13

## 2024-12-13 ENCOUNTER — TELEPHONE (OUTPATIENT)
Age: 54
End: 2024-12-13

## 2024-12-13 NOTE — TELEPHONE ENCOUNTER
PT called in stating that she was trying to schedule CT through [facility], but they were unable to schedule because order was written for today.     Reviewed CT order and noted   Expected Expires      12/12/2024 12/12/2028        Called [facility] and was informed that PT would be able to schedule.     Called PT back and updated. Pt verbalized understanding, and agreeable to plan. Will call [facility] back.

## 2024-12-13 NOTE — TELEPHONE ENCOUNTER
Caller: Neda    Doctor/Office: Vikram    CB#: [phone]      What needs to be faxed: Letter for Aqua Therapy    ATTN to: Brooklyn Leigh PT    Fax#: [phone]      Documents were successfully e-faxed

## 2024-12-16 NOTE — TELEPHONE ENCOUNTER
Patient calling back to schedule, Spoke with Sheri Kay MA, whom scheduled the patient on 12/31/24 at 1:45 pm in [facility] patient verbalized understanding.

## 2024-12-26 ENCOUNTER — TELEPHONE (OUTPATIENT)
Dept: OTOLARYNGOLOGY | Facility: CLINIC | Age: 54
End: 2024-12-26

## 2024-12-26 NOTE — TELEPHONE ENCOUNTER
Patient NO SHOW to her CT Scan appointment.  I try reaching out to patient but no luck. Patient can feel free to give our office a call at [phone].

## 2024-12-31 ENCOUNTER — OFFICE VISIT (OUTPATIENT)
Age: 54
End: 2024-12-31
Payer: MEDICARE

## 2024-12-31 VITALS — WEIGHT: 164 LBS | BODY MASS INDEX: 30.18 KG/M2 | HEIGHT: 62 IN

## 2024-12-31 DIAGNOSIS — M70.52 PES ANSERINUS BURSITIS OF LEFT KNEE: Primary | ICD-10-CM

## 2024-12-31 PROCEDURE — 20610 DRAIN/INJ JOINT/BURSA W/O US: CPT | Performed by: STUDENT IN AN ORGANIZED HEALTH CARE EDUCATION/TRAINING PROGRAM

## 2024-12-31 PROCEDURE — 99024 POSTOP FOLLOW-UP VISIT: CPT | Performed by: STUDENT IN AN ORGANIZED HEALTH CARE EDUCATION/TRAINING PROGRAM

## 2024-12-31 RX ADMIN — TRIAMCINOLONE ACETONIDE 40 MG: 40 INJECTION, SUSPENSION INTRA-ARTICULAR; INTRAMUSCULAR at 13:45

## 2024-12-31 RX ADMIN — BUPIVACAINE HYDROCHLORIDE 4 ML: 2.5 INJECTION, SOLUTION INFILTRATION; PERINEURAL at 13:45

## 2024-12-31 NOTE — PROGRESS NOTES
Subjective: Patient seen and examined. Pain well-controlled overall, but she is having some pain over the medial aspect of the knee/tibia.  Progressing well, she ambulates with her rollator walker today.  (She was using a rollator pre-op). Incision well healed.  Denies fevers or chills. Overall she is doing well and progressing in PT, but she is concerned about her medial knee pain.     Physical Exam:  Incision: CDI, no erythema or drainage noted  ROM: 0-125 without pain  +TTP over hamstring tendons/pes bursa  5/5 IP/Q/HS/TA/GS, 2+ DP/PT, SILT DP/SP/S/S/TN        Assessment/Plan:  55 y/o female 11 weeks s/p Left TKA from 10/14/24 with pes anserine bursitis.    - continue multi-modal pain control   - Weight bearing status: WBAT LLE  - DVT ppx: complete  - Incision care: No restrictions  - PT/OT  - Pes bursa cortisone injection performed today.  Work on hamstring stretching.  - F/up in 3 months for recheck of left knee and possible repeat pes bursa cortisone injection.    Large joint arthrocentesis: L knee  Universal Protocol:  Consent: Verbal consent obtained.  Risks and benefits: risks, benefits and alternatives were discussed  Consent given by: patient  Patient understanding: patient states understanding of the procedure being performed  Procedure Details  Location: knee - L knee  Needle size: 22 G  Ultrasound guidance: no  Approach: medial  Medications administered: 4 mL bupivacaine 0.25 %; 40 mg triamcinolone acetonide 40 mg/mL    Patient tolerance: patient tolerated the procedure well with no immediate complications  Dressing:  Sterile dressing applied              Scribe Attestation      I,:  Venessa Guzmán PA-C am acting as a scribe while in the presence of the attending physician.:       I,:  Michael Sue DO personally performed the services described in this documentation    as scribed in my presence.:

## 2025-01-05 RX ORDER — BUPIVACAINE HYDROCHLORIDE 2.5 MG/ML
4 INJECTION, SOLUTION INFILTRATION; PERINEURAL
Status: COMPLETED | OUTPATIENT
Start: 2024-12-31 | End: 2024-12-31

## 2025-01-05 RX ORDER — TRIAMCINOLONE ACETONIDE 40 MG/ML
40 INJECTION, SUSPENSION INTRA-ARTICULAR; INTRAMUSCULAR
Status: COMPLETED | OUTPATIENT
Start: 2024-12-31 | End: 2024-12-31

## 2025-01-20 ENCOUNTER — TELEPHONE (OUTPATIENT)
Age: 55
End: 2025-01-20

## 2025-01-20 DIAGNOSIS — M17.12 PRIMARY OSTEOARTHRITIS OF LEFT KNEE: ICD-10-CM

## 2025-01-20 RX ORDER — MULTIVIT-MIN/IRON FUM/FOLIC AC 7.5 MG-4
1 TABLET ORAL DAILY
Qty: 30 TABLET | Refills: 0 | Status: SHIPPED | OUTPATIENT
Start: 2025-01-20

## 2025-01-20 RX ORDER — ACETAMINOPHEN 500 MG
1000 TABLET ORAL EVERY 8 HOURS
Qty: 60 TABLET | Refills: 0 | Status: SHIPPED | OUTPATIENT
Start: 2025-01-20

## 2025-01-20 RX ORDER — CELECOXIB 200 MG/1
200 CAPSULE ORAL 2 TIMES DAILY
Qty: 60 CAPSULE | Refills: 0 | Status: SHIPPED | OUTPATIENT
Start: 2025-01-20

## 2025-01-20 NOTE — TELEPHONE ENCOUNTER
Caller: Patient    Doctor: Vikram Yan    Reason for call: Patient is calling in regards to medication refill she was supposed to have sent out from her last apt on 12/31. Patient states it was for all the medications/vitamins previously prescribed from 9/10 and 10/14; please advise.    Patient also needs a new script for PT; please advise.     Call back#: [phone]

## 2025-01-22 ENCOUNTER — TELEPHONE (OUTPATIENT)
Dept: FAMILY MEDICINE CLINIC | Facility: CLINIC | Age: 55
End: 2025-01-22

## 2025-01-22 ENCOUNTER — TELEPHONE (OUTPATIENT)
Age: 55
End: 2025-01-22

## 2025-01-22 NOTE — TELEPHONE ENCOUNTER
Caller: Patient     Doctor: Vikram     Reason for call: Patient asked for us to fax over the PT script   Fax- [phone]

## 2025-01-22 NOTE — TELEPHONE ENCOUNTER
Pt called and left a Voicemail. Wants to make an appointment and wants to have a message sent to her pcp. She would like a referral for mental health services       Called pt and phone went to voicemail. I did make a telephone encounter asking provider for the referral

## 2025-01-22 NOTE — TELEPHONE ENCOUNTER
Patient has been added to the Medication Management wait list without a referral.    Insurance: [employer]  Insurance Type:    Commercial []   Medicaid [x]   [facility] (if applicable)   Medicare []  Location Preference:   Provider Preference:   Virtual: Yes [] No []  Were outside resources sent: Yes [x] No []

## 2025-01-24 NOTE — TELEPHONE ENCOUNTER
second attempt to contact patient. left message to return my call on answering machine      Called Pt because she called and left a Voicemail asking about the referral she requested yesterday 1/23/25      Called pt back and got her Voicemail left a message

## 2025-01-28 ENCOUNTER — TELEPHONE (OUTPATIENT)
Dept: FAMILY MEDICINE CLINIC | Facility: CLINIC | Age: 55
End: 2025-01-28

## 2025-01-28 NOTE — TELEPHONE ENCOUNTER
third attempt to contact patient. left message to return my call on answering machine        Call pt to see if she would like to schedule an appointment with her provider for a Mental health Assessment      Letter sent

## 2025-01-30 ENCOUNTER — TELEPHONE (OUTPATIENT)
Dept: FAMILY MEDICINE CLINIC | Facility: CLINIC | Age: 55
End: 2025-01-30

## 2025-01-30 NOTE — TELEPHONE ENCOUNTER
first attempt to contact patient. no answer left message to return my call on answering machine

## 2025-02-01 DIAGNOSIS — K21.9 GASTROESOPHAGEAL REFLUX DISEASE WITHOUT ESOPHAGITIS: ICD-10-CM

## 2025-02-03 ENCOUNTER — TELEPHONE (OUTPATIENT)
Dept: FAMILY MEDICINE CLINIC | Facility: CLINIC | Age: 55
End: 2025-02-03

## 2025-02-03 RX ORDER — OMEPRAZOLE 40 MG/1
40 CAPSULE, DELAYED RELEASE ORAL DAILY
Qty: 30 CAPSULE | Refills: 0 | Status: SHIPPED | OUTPATIENT
Start: 2025-02-03

## 2025-02-03 NOTE — TELEPHONE ENCOUNTER
Seven this is Neda Anna calling. I'm calling to reschedule my appointment. I did have another appointment today that I forgot so I won't be able to make it. My birthday is 1970 by address [address] My phone number [phone]. If you can call me back, schedule this appointment, I'd greatly appreciate it. I completely forgot I had an appointment today so please give me a call back to reschedule. Thank you.      Called patient back and rescheduled appointment.

## 2025-02-05 ENCOUNTER — OFFICE VISIT (OUTPATIENT)
Dept: FAMILY MEDICINE CLINIC | Facility: CLINIC | Age: 55
End: 2025-02-05

## 2025-02-05 VITALS
DIASTOLIC BLOOD PRESSURE: 60 MMHG | WEIGHT: 156 LBS | SYSTOLIC BLOOD PRESSURE: 104 MMHG | RESPIRATION RATE: 16 BRPM | HEART RATE: 74 BPM | HEIGHT: 62 IN | BODY MASS INDEX: 28.71 KG/M2 | OXYGEN SATURATION: 97 % | TEMPERATURE: 98 F

## 2025-02-05 DIAGNOSIS — F51.01 PRIMARY INSOMNIA: ICD-10-CM

## 2025-02-05 DIAGNOSIS — R73.03 PREDIABETES: ICD-10-CM

## 2025-02-05 DIAGNOSIS — I10 BENIGN HYPERTENSION: ICD-10-CM

## 2025-02-05 DIAGNOSIS — Z23 ENCOUNTER FOR IMMUNIZATION: ICD-10-CM

## 2025-02-05 DIAGNOSIS — K21.9 GASTROESOPHAGEAL REFLUX DISEASE WITHOUT ESOPHAGITIS: ICD-10-CM

## 2025-02-05 DIAGNOSIS — F31.61 BIPOLAR DISORDER, CURRENT EPISODE MIXED, MILD (HCC): Primary | ICD-10-CM

## 2025-02-05 DIAGNOSIS — Z23 NEED FOR COVID-19 VACCINE: ICD-10-CM

## 2025-02-05 DIAGNOSIS — R29.818 SUSPECTED SLEEP APNEA: ICD-10-CM

## 2025-02-05 DIAGNOSIS — J06.9 VIRAL URI WITH COUGH: ICD-10-CM

## 2025-02-05 PROBLEM — Z91.89 AT RISK FOR SLEEP APNEA: Status: RESOLVED | Noted: 2023-08-30 | Resolved: 2025-02-05

## 2025-02-05 PROCEDURE — 90673 RIV3 VACCINE NO PRESERV IM: CPT | Performed by: FAMILY MEDICINE

## 2025-02-05 PROCEDURE — 99214 OFFICE O/P EST MOD 30 MIN: CPT | Performed by: FAMILY MEDICINE

## 2025-02-05 PROCEDURE — 90480 ADMN SARSCOV2 VAC 1/ONLY CMP: CPT | Performed by: FAMILY MEDICINE

## 2025-02-05 PROCEDURE — 90471 IMMUNIZATION ADMIN: CPT | Performed by: FAMILY MEDICINE

## 2025-02-05 PROCEDURE — 90472 IMMUNIZATION ADMIN EACH ADD: CPT | Performed by: FAMILY MEDICINE

## 2025-02-05 PROCEDURE — 90750 HZV VACC RECOMBINANT IM: CPT | Performed by: FAMILY MEDICINE

## 2025-02-05 PROCEDURE — 91320 SARSCV2 VAC 30MCG TRS-SUC IM: CPT | Performed by: FAMILY MEDICINE

## 2025-02-05 RX ORDER — AMLODIPINE BESYLATE 10 MG/1
10 TABLET ORAL DAILY
Qty: 90 TABLET | Refills: 1 | Status: SHIPPED | OUTPATIENT
Start: 2025-02-05

## 2025-02-05 RX ORDER — PAROXETINE 10 MG/1
10 TABLET, FILM COATED ORAL DAILY
Qty: 30 TABLET | Refills: 3 | Status: SHIPPED | OUTPATIENT
Start: 2025-02-05

## 2025-02-05 RX ORDER — BROMPHENIRAMINE MALEATE, PSEUDOEPHEDRINE HYDROCHLORIDE, AND DEXTROMETHORPHAN HYDROBROMIDE 2; 30; 10 MG/5ML; MG/5ML; MG/5ML
5 SYRUP ORAL 4 TIMES DAILY PRN
Qty: 120 ML | Refills: 0 | Status: SHIPPED | OUTPATIENT
Start: 2025-02-05

## 2025-02-05 RX ORDER — TRAZODONE HYDROCHLORIDE 100 MG/1
100 TABLET ORAL
Qty: 60 TABLET | Refills: 3 | Status: SHIPPED | OUTPATIENT
Start: 2025-02-05

## 2025-02-05 RX ORDER — LURASIDONE HYDROCHLORIDE 40 MG/1
40 TABLET, FILM COATED ORAL
Qty: 30 TABLET | Refills: 3 | Status: SHIPPED | OUTPATIENT
Start: 2025-02-05

## 2025-02-05 NOTE — ASSESSMENT & PLAN NOTE
Stable  Continue Latuda and Paxil  Provided resources for outpatient mental therapy  Orders:    lurasidone (LATUDA) 40 mg tablet; Take 1 tablet (40 mg total) by mouth daily with dinner    PARoxetine (PAXIL) 10 mg tablet; Take 1 tablet (10 mg total) by mouth daily

## 2025-02-05 NOTE — ASSESSMENT & PLAN NOTE
Patient has longstanding history of nightime apnea and daytime fatigue  Referral for sleep study      Orders:    Ambulatory Referral to Sleep Medicine; Future

## 2025-02-05 NOTE — ASSESSMENT & PLAN NOTE
Recommend low carbohydrate diet  Recheck hemoglobin A1c    Orders:    CBC and differential; Future    Comprehensive metabolic panel; Future    Hemoglobin A1C; Future    Lipid panel; Future

## 2025-02-05 NOTE — PATIENT INSTRUCTIONS
Outpatient Mental Health Resources    If you would like to be placed on the wait list for services with [facility] you MUST contact intake at [facility] - [phone]    Emergency & Crisis Support    Suicide and Crisis Lifeline: Call or text 988 (Available 24 hours)  Crisis Text Line: Text HOME to 839206 (Available 24/7)  Warm Line: Call [phone] (Confidential mental health support, available Monday-Sunday: 6 AM-10 AM & 4 PM-12 AM)  [facility] Crisis: Call [phone] (For mental health emergencies, or visit your local Emergency Department)  [facility] [place]: Call [phone] (24/7 [facility]ocate Hotline, counseling, court & hospital accompaniment, free services)    [facility] Services    [facility]  Call [phone]  Website: [email or web address]  Support for mental health conditions. Free services for all    [facility]  [address], [place] [address] [phone]  Services for all ages; Bilingual (English/Tajik); Accepts Medical Assistance, Medicare and commercial insurance    [facility]  [address], [place] [address] [phone]  Services for all ages; Bilingual (English/Tajik); Accepts [employer] Blue [employer] Blue Shield, [employer], Aetna, Optum and Cigna    [facility]  [address] [place], [place] [address] [phone]  Services for ages 4+. English only; Does NOT accept Medical Assistance (only Commercial Insurance)    [facility]   [address], [place], [place]   [phone]  Services for all ages; English only; Accepts [employer] Blue [employer] Blue Shield, [employer] Blue Cross Blue Sheild and Medicare    [facility]  [address] [place] [address] [phone]  Services for ages 6+. Bilingual (English/Tajik); Accepts Medical Assistance only    [facility]  [address] [place] [address]  [phone]  Services for ages 5+.  Bilingual (English/Ecuadorean); Accepts Medical Assistance    [facility]  [address], [place] [address]  [phone]  Services for ages 14+. Bilingual (English/Ecuadorean); Accepts Medical Assistance, Medicare, and Commercial Insurance    [facility]  [address] [place] [address]  [phone]  Services for ages 5+. Bilingual (English/Ecuadorean); Accepts Medical Assistance    [facility]  [address], [place] [address]  [phone]  Services for ages 3+. Bilingual (English/Ecuadorean); Accepts Medical Assistance and Some Commercial Insurance    [facility]  [address], [place] [address]  [phone]  Services for ages 5+. Bilingual (English/Ecuadorean); Accepts Medical Assistance    [facility]  [address], [place] [address]  [phone]  Services for ages 6+. Bilingual (English/Ecuadorean); Accepts Medical Assistance and Commercial Insurance    [facility]   [address] [place] [address]  [phone]  Services for ages 5+. Bilingual (English/Ecuadorean); Accepts Medical Assistance, Medicare and Commercial Insurance     [facility]  [address], [place] [address]  [phone]  Services for all ages (Therapy); 18+ (Psychiatry); English only; Accepts [employer] Blue Cross, Aetna, [employer], [employer], [facility] (CHIP & commercial insurance)      [email or web address] is a resource to find psychotherapy providers, patients can filter therapist search list based on several criteria including language, specialty, gender, insurance, etc. Individuals seeking will need to reach out to perspective providers through information in the directory. You are encouraged to contact multiple providers to given that many providers have a significant wait list for services as well as to find a provider is a good fit for you!     Please contact your insurance provider for additional information.

## 2025-02-05 NOTE — PROGRESS NOTES
Name: Neda Anna      : 1970      MRN: [id number]  Encounter Provider: Abran Crocker MD  Encounter Date: 2025   Encounter department: [facility]  :  Assessment & Plan  Bipolar disorder, current episode mixed, mild (HCC)  Stable  Continue Latuda and Paxil  Provided resources for outpatient mental therapy  Orders:    lurasidone (LATUDA) 40 mg tablet; Take 1 tablet (40 mg total) by mouth daily with dinner    PARoxetine (PAXIL) 10 mg tablet; Take 1 tablet (10 mg total) by mouth daily    Benign hypertension  Well-controlled  Continue amlodipine  Orders:    amLODIPine (NORVASC) 10 mg tablet; Take 1 tablet (10 mg total) by mouth daily    CBC and differential; Future    Comprehensive metabolic panel; Future    Hemoglobin A1C; Future    Primary insomnia    Orders:    traZODone (DESYREL) 100 mg tablet; Take 1 tablet (100 mg total) by mouth daily at bedtime    Prediabetes  Recommend low carbohydrate diet  Recheck hemoglobin A1c    Orders:    CBC and differential; Future    Comprehensive metabolic panel; Future    Hemoglobin A1C; Future    Lipid panel; Future    Encounter for immunization    Orders:    Zoster Vaccine Recombinant IM    influenza vaccine, recombinant, PF, 0.5 mL IM (Flublok)    Need for COVID-19 vaccine    Orders:    COVID-19 Pfizer mRNA vaccine 12 yr and older (Comirnaty pre-filled syringe)    Gastroesophageal reflux disease without esophagitis  Stable  Continue PPI as needed           Suspected sleep apnea  Patient has longstanding history of nightime apnea and daytime fatigue  Referral for sleep study      Orders:    Ambulatory Referral to Sleep Medicine; Future    Viral URI with cough  Few day onset of viral URI  Recommend symptomatic treatment      Orders:    brompheniramine-pseudoephedrine-DM 30-2-10 MG/5ML syrup; Take 5 mL by mouth 4 (four) times a day as needed for cough or congestion           History of Present Illness   54-year-old female  "with a history of bipolar disorder, hypertension, insomnia, and prediabetes presents today for follow-up.  Patient needs prescription refills.  She is trying to establish care with psychiatry.  She needs refill for her psychiatric medication until she can establish care with psychiatry.  She reports that her mental health currently stable.  She denies any suicidal or homicidal ideation.  She reports a few day onset of nasal and cough.  She denies any sick contact.  She denies any fever, chills, nausea or vomiting      Review of Systems   Constitutional:  Negative for appetite change, chills, diaphoresis, fatigue and fever.   HENT:  Positive for congestion. Negative for sore throat.    Eyes:  Negative for visual disturbance.   Respiratory:  Positive for cough. Negative for shortness of breath and wheezing.    Cardiovascular:  Negative for chest pain, palpitations and leg swelling.   Gastrointestinal:  Negative for abdominal pain, diarrhea, nausea and vomiting.   Genitourinary:  Negative for dysuria, hematuria and urgency.   Musculoskeletal:  Negative for arthralgias.   Skin:  Negative for rash.   Neurological:  Negative for dizziness, seizures, syncope, weakness, numbness and headaches.   Hematological:  Negative for adenopathy.   Psychiatric/Behavioral:  Negative for confusion.        Objective   /60 (BP Location: Right arm, Patient Position: Sitting, Cuff Size: Standard)   Pulse 74   Temp 98 °F (36.7 °C) (Temporal)   Resp 16   Ht 5' 2\" (1.575 m)   Wt 70.8 kg (156 lb)   LMP 05/10/2016   SpO2 97%   BMI 28.53 kg/m²      Physical Exam  Constitutional:       General: She is not in acute distress.     Appearance: Normal appearance. She is well-developed. She is not ill-appearing, toxic-appearing or diaphoretic.   HENT:      Head: Normocephalic and atraumatic.      Right Ear: External ear normal.      Left Ear: External ear normal.      Nose: Nose normal.      Mouth/Throat:      Mouth: Mucous membranes are " moist.   Eyes:      General: No scleral icterus.        Right eye: No discharge.         Left eye: No discharge.      Extraocular Movements: Extraocular movements intact.   Cardiovascular:      Rate and Rhythm: Normal rate and regular rhythm.      Heart sounds: Normal heart sounds. No murmur heard.     No friction rub. No gallop.   Pulmonary:      Effort: Pulmonary effort is normal. No respiratory distress.      Breath sounds: No stridor. No wheezing.   Abdominal:      General: Bowel sounds are normal. There is no distension.      Palpations: Abdomen is soft. There is no mass.      Tenderness: There is no abdominal tenderness. There is no guarding.   Musculoskeletal:         General: Normal range of motion.      Cervical back: Normal range of motion.      Right lower leg: No edema.      Left lower leg: No edema.   Skin:     General: Skin is warm.      Capillary Refill: Capillary refill takes less than 2 seconds.   Neurological:      General: No focal deficit present.      Mental Status: She is alert and oriented to person, place, and time.      Cranial Nerves: No cranial nerve deficit.      Motor: No weakness.      Gait: Gait normal.   Psychiatric:         Mood and Affect: Mood normal.         Behavior: Behavior normal.

## 2025-02-07 ENCOUNTER — HOSPITAL ENCOUNTER (OUTPATIENT)
Dept: RADIOLOGY | Facility: HOSPITAL | Age: 55
Discharge: HOME | End: 2025-02-07
Attending: DENTIST
Payer: MEDICARE

## 2025-02-07 ENCOUNTER — TELEPHONE (OUTPATIENT)
Age: 55
End: 2025-02-07

## 2025-02-07 DIAGNOSIS — S09.92XS NASAL TRAUMA, SEQUELA: ICD-10-CM

## 2025-02-07 DIAGNOSIS — R09.81 NASAL CONGESTION: ICD-10-CM

## 2025-02-07 DIAGNOSIS — J32.4 CHRONIC PANSINUSITIS: ICD-10-CM

## 2025-02-07 DIAGNOSIS — Z87.828 HISTORY OF FACIAL TRAUMA: ICD-10-CM

## 2025-02-07 DIAGNOSIS — J34.2 DNS (DEVIATED NASAL SEPTUM): ICD-10-CM

## 2025-02-07 DIAGNOSIS — Z98.890 HISTORY OF NASAL SURGERY: ICD-10-CM

## 2025-02-07 DIAGNOSIS — M95.0 ACQUIRED NASAL DEFORMITY: ICD-10-CM

## 2025-02-07 PROCEDURE — 70486 CT MAXILLOFACIAL W/O DYE: CPT

## 2025-02-17 ENCOUNTER — TELEPHONE (OUTPATIENT)
Age: 55
End: 2025-02-17

## 2025-02-17 NOTE — TELEPHONE ENCOUNTER
Radiology calling to inform of immediate findings on the CT scan  2/7/25 ordered by Dalton Vergara.     Pt has follow up on 3/20/25 with Dr. Carpenter.     Please advise.

## 2025-04-01 ENCOUNTER — OFFICE VISIT (OUTPATIENT)
Age: 55
End: 2025-04-01
Payer: MEDICARE

## 2025-04-01 VITALS — BODY MASS INDEX: 28.71 KG/M2 | HEIGHT: 62 IN | WEIGHT: 156 LBS

## 2025-04-01 DIAGNOSIS — M17.11 PRIMARY OSTEOARTHRITIS OF RIGHT KNEE: Primary | ICD-10-CM

## 2025-04-01 DIAGNOSIS — Z96.652 STATUS POST TOTAL KNEE REPLACEMENT, LEFT: ICD-10-CM

## 2025-04-01 DIAGNOSIS — M70.52 PES ANSERINUS BURSITIS OF LEFT KNEE: ICD-10-CM

## 2025-04-01 PROCEDURE — 20610 DRAIN/INJ JOINT/BURSA W/O US: CPT | Performed by: STUDENT IN AN ORGANIZED HEALTH CARE EDUCATION/TRAINING PROGRAM

## 2025-04-01 PROCEDURE — 99213 OFFICE O/P EST LOW 20 MIN: CPT | Performed by: STUDENT IN AN ORGANIZED HEALTH CARE EDUCATION/TRAINING PROGRAM

## 2025-04-01 RX ORDER — LIDOCAINE HYDROCHLORIDE 10 MG/ML
4 INJECTION, SOLUTION INFILTRATION; PERINEURAL
Status: COMPLETED | OUTPATIENT
Start: 2025-04-01 | End: 2025-04-01

## 2025-04-01 RX ORDER — TRIAMCINOLONE ACETONIDE 40 MG/ML
40 INJECTION, SUSPENSION INTRA-ARTICULAR; INTRAMUSCULAR
Status: COMPLETED | OUTPATIENT
Start: 2025-04-01 | End: 2025-04-01

## 2025-04-01 RX ORDER — ASCORBIC ACID 500 MG
500 TABLET ORAL 2 TIMES DAILY
Qty: 60 TABLET | Refills: 1 | Status: SHIPPED | OUTPATIENT
Start: 2025-04-01

## 2025-04-01 RX ORDER — MULTIVIT-MIN/IRON FUM/FOLIC AC 7.5 MG-4
1 TABLET ORAL DAILY
Qty: 30 TABLET | Refills: 0 | Status: SHIPPED | OUTPATIENT
Start: 2025-04-01

## 2025-04-01 RX ORDER — FOLIC ACID 1 MG/1
1 TABLET ORAL DAILY
Qty: 30 TABLET | Refills: 1 | Status: SHIPPED | OUTPATIENT
Start: 2025-04-01

## 2025-04-01 RX ADMIN — LIDOCAINE HYDROCHLORIDE 4 ML: 10 INJECTION, SOLUTION INFILTRATION; PERINEURAL at 15:00

## 2025-04-01 RX ADMIN — TRIAMCINOLONE ACETONIDE 40 MG: 40 INJECTION, SUSPENSION INTRA-ARTICULAR; INTRAMUSCULAR at 15:00

## 2025-04-01 NOTE — ASSESSMENT & PLAN NOTE
Findings today are consistent with right knee osteoarthritis. Discussed treatment options including continued observation, low impact exercises, bracing, anti-inflammatories, physical therapy, cortisone injection, visco injection, versus surgical intervention. Cortisone steroid injection was administered to the right knee today. Patient should avoid strenuous activities for the next 1-2 days. Patient should avoid vaccines for the next 2 weeks if possible.  Can apply cold compress for soreness. If patient feels relief with the cortisone injections, procedure can be repeated every 3 months. Follow up in 3 months.    Orders:    folic acid (FOLVITE) 1 mg tablet; Take 1 tablet (1 mg total) by mouth daily    ascorbic acid (VITAMIN C) 500 MG tablet; Take 1 tablet (500 mg total) by mouth 2 (two) times a day    Cholecalciferol (VITAMIN D3) 1,000 units tablet; Take 2 tablets (2,000 Units total) by mouth daily    Multiple Vitamins-Minerals (multivitamin with minerals) tablet; Take 1 tablet by mouth daily

## 2025-04-01 NOTE — PROGRESS NOTES
Knee New Office Note    Assessment:     1. Status post total knee replacement, left    2. Pes anserinus bursitis of left knee    3. Primary osteoarthritis of right knee        Plan:  Assessment & Plan  Status post total knee replacement, left  Findings today are consistent with 5.5 months s/p left total knee arthroplasty performed on 10/14/2024 and pes anserine bursitis. Cortisone steroid injection was administered to the left pes anserine bursa today. Patient should avoid strenuous activities for the next 1-2 days. Patient should avoid vaccines for the next 2 weeks if possible. Can apply cold compress for soreness. If patient feels relief with the cortisone injections, procedure can be repeated every 3 months. She was encouraged to continue her physical therapy exercises. Follow up in 3 months.         Primary osteoarthritis of right knee  Findings today are consistent with right knee osteoarthritis. Discussed treatment options including continued observation, low impact exercises, bracing, anti-inflammatories, physical therapy, cortisone injection, visco injection, versus surgical intervention. Cortisone steroid injection was administered to the right knee today. Patient should avoid strenuous activities for the next 1-2 days. Patient should avoid vaccines for the next 2 weeks if possible.  Can apply cold compress for soreness. If patient feels relief with the cortisone injections, procedure can be repeated every 3 months. Follow up in 3 months.    Orders:    folic acid (FOLVITE) 1 mg tablet; Take 1 tablet (1 mg total) by mouth daily    ascorbic acid (VITAMIN C) 500 MG tablet; Take 1 tablet (500 mg total) by mouth 2 (two) times a day    Cholecalciferol (VITAMIN D3) 1,000 units tablet; Take 2 tablets (2,000 Units total) by mouth daily    Multiple Vitamins-Minerals (multivitamin with minerals) tablet; Take 1 tablet by mouth daily    Subjective:     Patient ID: Neda Anna is a 54 y.o. female.  Chief  Complaint:  HPI:  54 y.o. female presents to the office 5.5 months s/p left total knee arthroplasty performed on 10/14/2024. At last visit she received a left knee pes anserine bursa cortisone injection which was beneficial for her. She notes return of medial left knee pain and continued feelings of weakness. She is also experiencing worsening right medial knee pain. She is interested in discussing further treatment options.    Allergy:  No Known Allergies  Medications:  all current active meds have been reviewed  Past Medical History:  Past Medical History:   Diagnosis Date    Anxiety     Arthritis     Bipolar 1 disorder (HCC)     Chronic neck and back pain     Class 1 obesity due to excess calories with serious comorbidity and body mass index (BMI) of 32.0 to 32.9 in adult 03/13/2020    Depression     Dizziness     GERD (gastroesophageal reflux disease)     Headache     Headache(784.0)     History of transfusion     Hypertension     Joint pain     and swelling    Night sweats     Obesity     Osteoarthritis     PTSD (post-traumatic stress disorder)     Seizures (HCC)      Past Surgical History:  Past Surgical History:   Procedure Laterality Date    COLONOSCOPY      FACIAL FRACTURE SURGERY      FRACTURE SURGERY      KNEE ARTHROSCOPY W/ MENISCECTOMY Left     ORTHOPEDIC SURGERY      TENDON REPAIR      TOTAL KNEE ARTHROPLASTY Left 10/14/2024    Procedure: ARTHROPLASTY KNEE TOTAL;  Surgeon: Michael Sue DO;  Location: [facility];  Service: Orthopedics     Family History:  Family History   Problem Relation Age of Onset    Diabetes Mother     Hypertension Mother     Thyroid disease Mother     No Known Problems Father     No Known Problems Sister     No Known Problems Daughter     Lung cancer Maternal Grandmother     No Known Problems Maternal Grandfather     No Known Problems Paternal Grandmother     No Known Problems Paternal Grandfather     No Known Problems Brother     Heart disease Neg Hx     Stroke Neg Hx      Breast cancer Neg Hx      Social History:  Social History     Substance and Sexual Activity   Alcohol Use Yes    Alcohol/week: 6.0 - 7.0 standard drinks of alcohol    Types: 3 Glasses of wine, 3 - 4 Cans of beer per week    Comment: social     Social History     Substance and Sexual Activity   Drug Use Yes    Types: Marijuana    Comment: daily     Social History     Tobacco Use   Smoking Status Never    Passive exposure: Past   Smokeless Tobacco Never         ROS:  General: Per HPI  Skin: Negative, except if noted below  HEENT: Negative  Respiratory: Negative  Cardiovascular: Negative  Gastrointestinal: Negative  Urinary: Negative  Vascular: Negative  Musculoskeletal: Positive per HPI   Neurologic: Positive per HPI  Endocrine: Negative    Objective:  BP Readings from Last 1 Encounters:   02/05/25 104/60      Wt Readings from Last 1 Encounters:   04/01/25 70.8 kg (156 lb)        Respiratory:   non-labored respirations    Lymphatics:  no palpable lymph nodes    Gait:   Steady    Neurologic:   Alert and oriented times 3  Patient with normal sensation except as noted below  Deep tendon reflexes 2+ except as noted in MSK exam    Bilateral Lower Extremity:  Left Knee:      Inspection:  well healed incision    Overall limb alignment neutral    Effusion: none    ROM 0-125 without pain    Extensor Lag: none    Palpation: pes anserine bursa tender to palpation    AP Stability at 90 deg stable    M/L stability in full extension stable    M/L stability in midflexion stable    Motor: 5/5 Q/HS/TA/GS/P    Pulses: 2+ DP / 2+ PT    SILT DP/SP/S/S/TN    Right knee:     Inspection:  skin intact    Overall limb alignment mild varus    Effusion: none    ROM 0-120 with pain    Extensor Lag: none    Palpation: medial Joint line tenderness to palpation    AP Stability at 90 deg stable    M/L stability in full extension stable    M/L stability in midflexion stable    Motor: 5/5 Q/HS/TA/GS/P    Pulses: 2+ DP / 2+ PT    SILT  "DP/SP/S/S/TN    Imaging:  No new imaging obtained today    BMI:   Estimated body mass index is 28.53 kg/m² as calculated from the following:    Height as of this encounter: 5' 2\" (1.575 m).    Weight as of this encounter: 70.8 kg (156 lb).  BSA:   Estimated body surface area is 1.72 meters squared as calculated from the following:    Height as of this encounter: 5' 2\" (1.575 m).    Weight as of this encounter: 70.8 kg (156 lb).         Large joint arthrocentesis: L pes anserine bursa  Universal Protocol:  Consent: Verbal consent obtained.  Risks and benefits: risks, benefits and alternatives were discussed  Consent given by: patient  Time out: Immediately prior to procedure a \"time out\" was called to verify the correct patient, procedure, equipment, support staff and site/side marked as required.  Timeout called at: 4/1/2025 3:44 PM.  Patient understanding: patient states understanding of the procedure being performed  Site marked: the operative site was marked  Required items: required blood products, implants, devices, and special equipment available  Patient identity confirmed: verbally with patient  Supporting Documentation  Indications: pain   Procedure Details  Location: knee - L pes anserine bursa  Preparation: Patient was prepped and draped in the usual sterile fashion  Needle size: 22 G  Ultrasound guidance: no  Approach: anteromedial  Medications administered: 4 mL lidocaine 1 %; 40 mg triamcinolone acetonide 40 mg/mL    Patient tolerance: patient tolerated the procedure well with no immediate complications  Dressing:  Sterile dressing applied      Large joint arthrocentesis: R knee  Universal Protocol:  Consent: Verbal consent obtained.  Risks and benefits: risks, benefits and alternatives were discussed  Consent given by: patient  Time out: Immediately prior to procedure a \"time out\" was called to verify the correct patient, procedure, equipment, support staff and site/side marked as required.  Timeout " called at: 4/1/2025 3:44 PM.  Patient understanding: patient states understanding of the procedure being performed  Site marked: the operative site was marked  Required items: required blood products, implants, devices, and special equipment available  Patient identity confirmed: verbally with patient  Supporting Documentation  Indications: pain   Procedure Details  Location: knee - R knee  Preparation: Patient was prepped and draped in the usual sterile fashion  Needle size: 22 G  Ultrasound guidance: no  Approach: anterolateral  Medications administered: 4 mL lidocaine 1 %; 40 mg triamcinolone acetonide 40 mg/mL    Patient tolerance: patient tolerated the procedure well with no immediate complications  Dressing:  Sterile dressing applied        Scribe Attestation      I,:  Magali Castillo am acting as a scribe while in the presence of the attending physician.:       I,:  Michael Sue DO personally performed the services described in this documentation    as scribed in my presence.:

## 2025-04-03 ENCOUNTER — TELEPHONE (OUTPATIENT)
Dept: GASTROENTEROLOGY | Facility: MEDICAL CENTER | Age: 55
End: 2025-04-03

## 2025-04-22 ENCOUNTER — PREP FOR PROCEDURE (OUTPATIENT)
Age: 55
End: 2025-04-22

## 2025-04-22 ENCOUNTER — TELEPHONE (OUTPATIENT)
Age: 55
End: 2025-04-22

## 2025-04-22 DIAGNOSIS — Z12.11 SCREENING FOR COLON CANCER: Primary | ICD-10-CM

## 2025-04-22 NOTE — LETTER
Attached are your prep instructions for your upcoming procedure on 6/11/2025. If you have any questions or concerns please contact us at [phone].                Thank you,      [facility]

## 2025-04-22 NOTE — TELEPHONE ENCOUNTER
Scheduled date of colonoscopy (as of today):6/11/2025  Physician performing colonoscopy:Dr Ferrer  Location of colonoscopy:[place]  Bowel prep reviewed with patient:Miralax/Dulcolax  Instructions reviewed with patient by:sent via letter  Clearances: N/A

## 2025-04-22 NOTE — TELEPHONE ENCOUNTER
04/22/25  Screened by: Jo Miranda    Referring Provider     Pre- Screening:     There is no height or weight on file to calculate BMI.156lbs 28.53  Has patient been referred for a routine screening Colonoscopy? yes  Is the patient between 45-75 years old? yes      Previous Colonoscopy yes   If yes:    Date:     Facility:     Reason:    Does the patient want to see a Gastroenterologist prior to their procedure OR are they having any GI symptoms? no    Has the patient been hospitalized or had abdominal surgery in the past 6 months? no    Does the patient use supplemental oxygen? no    Does the patient take Coumadin, Lovenox, Plavix, Elliquis, Xarelto, or other blood thinning medication? no    Has the patient had a stroke, cardiac event, or stent placed in the past year? no      If patient is between 45yrs - 49yrs, please advise patient that we will have to confirm benefits & coverage with their insurance company for a routine screening colonoscopy.

## 2025-05-07 ENCOUNTER — EVALUATION (OUTPATIENT)
Dept: PHYSICAL THERAPY | Facility: MEDICAL CENTER | Age: 55
End: 2025-05-07
Attending: PHYSICIAN ASSISTANT
Payer: MEDICARE

## 2025-05-07 DIAGNOSIS — M17.12 PRIMARY OSTEOARTHRITIS OF LEFT KNEE: Primary | ICD-10-CM

## 2025-05-07 PROCEDURE — 97161 PT EVAL LOW COMPLEX 20 MIN: CPT | Performed by: PHYSICAL THERAPIST

## 2025-05-07 NOTE — PROGRESS NOTES
PT Evaluation     Today's date: 2025  Patient name: Neda Anna  : 1970  MRN: [id number]  Referring provider: Venessa Guzmán PA-C  Dx:   Encounter Diagnosis     ICD-10-CM    1. Primary osteoarthritis of left knee  M17.12 Ambulatory Referral to Physical Therapy                     Assessment  Impairments: abnormal muscle firing, abnormal muscle tone, abnormal or restricted ROM, impaired physical strength, lacks appropriate home exercise program and pain with function    Assessment details: Neda Anna is a 54 y.o. female was evaluated on 2025  for Primary osteoarthritis of left knee  (primary encounter diagnosis). Neda Anna has the above listed impairments resulting in functional deficits and negative impact to quality of life.  Patient is appropriate for skilled PT intervention to promote maximal return to function and patient specific goals.      Patient agrees with outlined treatment plan and all questions were answered to their satisfaction.      Understanding of Dx/Px/POC: good     Prognosis: good    Goals  Patient will successfully transition to home exercise program.  Patient will be able to manage symptoms independently.    Neda will report no limitation in walking long distances  Neda will increase strength by one muscle grade       Plan  Patient would benefit from: skilled PT  Referral necessary: No  Planned modality interventions: thermotherapy: hydrocollator packs    Planned therapy interventions: home exercise program, manual therapy, neuromuscular re-education, patient education, functional ROM exercises, strengthening, stretching, joint mobilization, graded activity, graded exercise, therapeutic exercise, body mechanics training, motor coordination training and activity modification    Frequency: 1x week  Duration in weeks: 12  Treatment plan discussed with: patient        Subjective Evaluation    History of Present Illness  Mechanism of injury: Neda Anna is a 54  y.o. female presenting to therapy with complaints of bilateral knee pain.   She reports having L TKA in October in , underwent therapy and regained her motion however feels like her knee never regained full strength.  She recently underwent injection to pes anersine bursa on L.   She is hoping to improve her strength in knees, considering R TKA in near future.    Patient Goals  Patient goals for therapy: decreased pain, increased motion, return to sport/leisure activities, independence with ADLs/IADLs and increased strength    Pain  Current pain rating: 3  At best pain ratin  At worst pain ratin  Quality: dull ache, discomfort, pressure and tight          Objective     Observations   Left Knee   Negative for edema and effusion.     Right Knee   Negative for edema and effusion.     Active Range of Motion   Left Knee   Normal active range of motion    Right Knee   Normal active range of motion    Mobility   Patellar Mobility:   Left Knee   WFL: medial, lateral, superior and inferior.     Right Knee   WFL: medial, lateral, superior and inferior    Strength/Myotome Testing     Left Hip   Planes of Motion   Flexion: 3+  Extension: 3+  Abduction: 3+  Adduction: 3+    Right Hip   Planes of Motion   Flexion: 3+  Extension: 3+  Abduction: 3+  Adduction: 3+    Left Knee   Flexion: 3+  Prone flexion: 3+  Extension: 3+    Right Knee   Flexion: 3+  Prone flexion: 3+  Extension: 4-             Precautions: None      Manuals                                                                 Neuro Re-Ed                                                                                                        Ther Ex             SLR             SAQ             Standing knee flexion             Leg press                                                                 Ther Activity                                       Gait Training                                       Modalities

## 2025-05-08 ENCOUNTER — TELEPHONE (OUTPATIENT)
Age: 55
End: 2025-05-08

## 2025-05-08 NOTE — TELEPHONE ENCOUNTER
Patient calling in sick and needing to surendra today appt     Rescheduled patient to next opening 6/2026

## 2025-05-14 ENCOUNTER — APPOINTMENT (OUTPATIENT)
Dept: PHYSICAL THERAPY | Facility: MEDICAL CENTER | Age: 55
End: 2025-05-14
Attending: PHYSICIAN ASSISTANT
Payer: MEDICARE

## 2025-05-24 DIAGNOSIS — F31.61 BIPOLAR DISORDER, CURRENT EPISODE MIXED, MILD (HCC): ICD-10-CM

## 2025-05-27 ENCOUNTER — TELEPHONE (OUTPATIENT)
Dept: GASTROENTEROLOGY | Facility: MEDICAL CENTER | Age: 55
End: 2025-05-27

## 2025-05-27 RX ORDER — LURASIDONE HYDROCHLORIDE 40 MG/1
40 TABLET, FILM COATED ORAL
Qty: 30 TABLET | Refills: 2 | Status: SHIPPED | OUTPATIENT
Start: 2025-05-27

## 2025-05-27 RX ORDER — PAROXETINE 10 MG/1
10 TABLET, FILM COATED ORAL DAILY
Qty: 30 TABLET | Refills: 2 | Status: SHIPPED | OUTPATIENT
Start: 2025-05-27

## 2025-05-27 NOTE — TELEPHONE ENCOUNTER
Procedure Confirmation sent to pt via [employer] for pt or review instructions for procedure and confirmation

## 2025-05-28 ENCOUNTER — ANESTHESIA (OUTPATIENT)
Dept: ANESTHESIOLOGY | Facility: HOSPITAL | Age: 55
End: 2025-05-28

## 2025-05-28 ENCOUNTER — ANESTHESIA EVENT (OUTPATIENT)
Dept: ANESTHESIOLOGY | Facility: HOSPITAL | Age: 55
End: 2025-05-28

## 2025-05-30 NOTE — TELEPHONE ENCOUNTER
Called and spoke to pt to confirm procedure for 06/11 with Dr. Jaiyeola, pt has confirmed procedure

## 2025-06-09 ENCOUNTER — OFFICE VISIT (OUTPATIENT)
Dept: PHYSICAL THERAPY | Facility: MEDICAL CENTER | Age: 55
End: 2025-06-09
Attending: PHYSICIAN ASSISTANT
Payer: MEDICARE

## 2025-06-09 DIAGNOSIS — M25.562 CHRONIC PAIN OF LEFT KNEE: Primary | ICD-10-CM

## 2025-06-09 DIAGNOSIS — G89.29 CHRONIC PAIN OF LEFT KNEE: Primary | ICD-10-CM

## 2025-06-09 PROCEDURE — 97110 THERAPEUTIC EXERCISES: CPT | Performed by: PHYSICAL THERAPIST

## 2025-06-09 PROCEDURE — 97140 MANUAL THERAPY 1/> REGIONS: CPT | Performed by: PHYSICAL THERAPIST

## 2025-06-09 NOTE — PROGRESS NOTES
Daily Note     Today's date: 2025  Patient name: Neda Anna  : 1970  MRN: [id number]  Referring provider: Venessa Guzmán PA-C  Dx:   Encounter Diagnosis     ICD-10-CM    1. Chronic pain of left knee  M25.562     G89.29                      Subjective: Neda reports knee has been painful, worse with weather changes       Objective: See treatment diary below      Assessment: Tolerated treatment well. Patient fatigued well with exercises today, pain mostly located medially.   Progress as able and as pain allows      Plan: Continue per plan of care.      Precautions: None      Manuals                                                                 Neuro Re-Ed                                                                                                        Ther Ex             SLR 2+  20            SAQ 2#  20            Standing knee flexion 30            Leg press 50#  30            Standing hip abduction Red  20                                                   Ther Activity                                       Gait Training                                       Modalities

## 2025-06-10 ENCOUNTER — TELEPHONE (OUTPATIENT)
Dept: OTHER | Facility: OTHER | Age: 55
End: 2025-06-10

## 2025-06-11 ENCOUNTER — APPOINTMENT (OUTPATIENT)
Dept: PHYSICAL THERAPY | Facility: MEDICAL CENTER | Age: 55
End: 2025-06-11
Attending: PHYSICIAN ASSISTANT
Payer: MEDICARE

## 2025-06-11 NOTE — TELEPHONE ENCOUNTER
Patient is calling regarding cancelling a procedure.    Date/Time: 6/10/2025 / 8:30 am     Performing Physician:     Milind Ferrer MD       Performing Physician/Nursing Supervisor Notified?:  YES [] NO [x]    Patient requesting call back to reschedule: YES [x] NO []

## 2025-06-13 ENCOUNTER — TELEPHONE (OUTPATIENT)
Dept: FAMILY MEDICINE CLINIC | Facility: CLINIC | Age: 55
End: 2025-06-13

## 2025-06-13 NOTE — TELEPHONE ENCOUNTER
IEB FORM RECEIVED ON 6/13/2025  GIVEN TO VIKA STEINER TO BE COMPLETED, SIGNED BY MD/ (INCLUDE LISC. NUMBER), AND FAXED BACK IN 3 DAYS

## 2025-06-16 ENCOUNTER — APPOINTMENT (OUTPATIENT)
Dept: LAB | Facility: HOSPITAL | Age: 55
End: 2025-06-16
Payer: MEDICARE

## 2025-06-16 ENCOUNTER — OFFICE VISIT (OUTPATIENT)
Dept: FAMILY MEDICINE CLINIC | Facility: CLINIC | Age: 55
End: 2025-06-16

## 2025-06-16 VITALS
SYSTOLIC BLOOD PRESSURE: 130 MMHG | DIASTOLIC BLOOD PRESSURE: 80 MMHG | BODY MASS INDEX: 27.6 KG/M2 | TEMPERATURE: 98 F | WEIGHT: 150 LBS | HEART RATE: 63 BPM | HEIGHT: 62 IN | RESPIRATION RATE: 16 BRPM | OXYGEN SATURATION: 95 %

## 2025-06-16 DIAGNOSIS — G89.4 CHRONIC PAIN SYNDROME: ICD-10-CM

## 2025-06-16 DIAGNOSIS — G89.29 CHRONIC BILATERAL LOW BACK PAIN WITHOUT SCIATICA: ICD-10-CM

## 2025-06-16 DIAGNOSIS — R73.03 PREDIABETES: ICD-10-CM

## 2025-06-16 DIAGNOSIS — I10 BENIGN HYPERTENSION: ICD-10-CM

## 2025-06-16 DIAGNOSIS — M47.816 LUMBAR SPONDYLOSIS: ICD-10-CM

## 2025-06-16 DIAGNOSIS — I10 PRIMARY HYPERTENSION: ICD-10-CM

## 2025-06-16 DIAGNOSIS — E66.3 OVERWEIGHT (BMI 25.0-29.9): ICD-10-CM

## 2025-06-16 DIAGNOSIS — M54.50 CHRONIC BILATERAL LOW BACK PAIN WITHOUT SCIATICA: ICD-10-CM

## 2025-06-16 DIAGNOSIS — M17.0 PRIMARY OSTEOARTHRITIS OF BOTH KNEES: ICD-10-CM

## 2025-06-16 DIAGNOSIS — E04.1 THYROID NODULE: ICD-10-CM

## 2025-06-16 DIAGNOSIS — F31.61 BIPOLAR DISORDER, CURRENT EPISODE MIXED, MILD (HCC): ICD-10-CM

## 2025-06-16 DIAGNOSIS — M51.369 LUMBAR DEGENERATIVE DISC DISEASE: ICD-10-CM

## 2025-06-16 DIAGNOSIS — E04.1 THYROID NODULE: Primary | ICD-10-CM

## 2025-06-16 PROBLEM — M17.12 PRIMARY OSTEOARTHRITIS OF LEFT KNEE: Status: RESOLVED | Noted: 2020-03-25 | Resolved: 2025-06-16

## 2025-06-16 PROBLEM — M54.9 MID BACK PAIN: Status: RESOLVED | Noted: 2018-09-24 | Resolved: 2025-06-16

## 2025-06-16 PROBLEM — L50.1 IDIOPATHIC URTICARIA: Status: RESOLVED | Noted: 2021-03-09 | Resolved: 2025-06-16

## 2025-06-16 PROBLEM — R21 RASH AND NONSPECIFIC SKIN ERUPTION: Status: RESOLVED | Noted: 2024-07-30 | Resolved: 2025-06-16

## 2025-06-16 PROBLEM — R42 DIZZINESS: Status: RESOLVED | Noted: 2021-03-09 | Resolved: 2025-06-16

## 2025-06-16 LAB
ALBUMIN SERPL BCG-MCNC: 4.3 G/DL (ref 3.5–5)
ALP SERPL-CCNC: 74 U/L (ref 34–104)
ALT SERPL W P-5'-P-CCNC: 10 U/L (ref 7–52)
ANION GAP SERPL CALCULATED.3IONS-SCNC: 10 MMOL/L (ref 4–13)
AST SERPL W P-5'-P-CCNC: 10 U/L (ref 13–39)
BASOPHILS # BLD AUTO: 0.07 THOUSANDS/ÂΜL (ref 0–0.1)
BASOPHILS NFR BLD AUTO: 1 % (ref 0–1)
BILIRUB SERPL-MCNC: 0.22 MG/DL (ref 0.2–1)
BUN SERPL-MCNC: 26 MG/DL (ref 5–25)
CALCIUM SERPL-MCNC: 9.6 MG/DL (ref 8.4–10.2)
CHLORIDE SERPL-SCNC: 107 MMOL/L (ref 96–108)
CHOLEST SERPL-MCNC: 238 MG/DL (ref ?–200)
CO2 SERPL-SCNC: 23 MMOL/L (ref 21–32)
CREAT SERPL-MCNC: 0.82 MG/DL (ref 0.6–1.3)
EOSINOPHIL # BLD AUTO: 0.19 THOUSAND/ÂΜL (ref 0–0.61)
EOSINOPHIL NFR BLD AUTO: 3 % (ref 0–6)
ERYTHROCYTE [DISTWIDTH] IN BLOOD BY AUTOMATED COUNT: 15.3 % (ref 11.6–15.1)
EST. AVERAGE GLUCOSE BLD GHB EST-MCNC: 128 MG/DL
GFR SERPL CREATININE-BSD FRML MDRD: 79 ML/MIN/1.73SQ M
GLUCOSE SERPL-MCNC: 89 MG/DL (ref 65–140)
HBA1C MFR BLD: 6.1 %
HCT VFR BLD AUTO: 41.4 % (ref 34.8–46.1)
HDLC SERPL-MCNC: 71 MG/DL
HGB BLD-MCNC: 12.9 G/DL (ref 11.5–15.4)
IMM GRANULOCYTES # BLD AUTO: 0.01 THOUSAND/UL (ref 0–0.2)
IMM GRANULOCYTES NFR BLD AUTO: 0 % (ref 0–2)
LDLC SERPL CALC-MCNC: 132 MG/DL (ref 0–100)
LYMPHOCYTES # BLD AUTO: 3.21 THOUSANDS/ÂΜL (ref 0.6–4.47)
LYMPHOCYTES NFR BLD AUTO: 42 % (ref 14–44)
MCH RBC QN AUTO: 27.9 PG (ref 26.8–34.3)
MCHC RBC AUTO-ENTMCNC: 31.2 G/DL (ref 31.4–37.4)
MCV RBC AUTO: 90 FL (ref 82–98)
MONOCYTES # BLD AUTO: 0.47 THOUSAND/ÂΜL (ref 0.17–1.22)
MONOCYTES NFR BLD AUTO: 6 % (ref 4–12)
NEUTROPHILS # BLD AUTO: 3.77 THOUSANDS/ÂΜL (ref 1.85–7.62)
NEUTS SEG NFR BLD AUTO: 48 % (ref 43–75)
NONHDLC SERPL-MCNC: 167 MG/DL
NRBC BLD AUTO-RTO: 0 /100 WBCS
PLATELET # BLD AUTO: 330 THOUSANDS/UL (ref 149–390)
PMV BLD AUTO: 11 FL (ref 8.9–12.7)
POTASSIUM SERPL-SCNC: 3.5 MMOL/L (ref 3.5–5.3)
PROT SERPL-MCNC: 7.7 G/DL (ref 6.4–8.4)
RBC # BLD AUTO: 4.62 MILLION/UL (ref 3.81–5.12)
SODIUM SERPL-SCNC: 140 MMOL/L (ref 135–147)
TRIGL SERPL-MCNC: 175 MG/DL (ref ?–150)
TSH SERPL DL<=0.05 MIU/L-ACNC: 1.34 UIU/ML (ref 0.45–4.5)
WBC # BLD AUTO: 7.7 THOUSAND/UL (ref 4.31–10.16)

## 2025-06-16 PROCEDURE — 99214 OFFICE O/P EST MOD 30 MIN: CPT | Performed by: FAMILY MEDICINE

## 2025-06-16 PROCEDURE — 83036 HEMOGLOBIN GLYCOSYLATED A1C: CPT

## 2025-06-16 PROCEDURE — 80061 LIPID PANEL: CPT

## 2025-06-16 PROCEDURE — 36415 COLL VENOUS BLD VENIPUNCTURE: CPT

## 2025-06-16 PROCEDURE — 84443 ASSAY THYROID STIM HORMONE: CPT

## 2025-06-16 PROCEDURE — 80053 COMPREHEN METABOLIC PANEL: CPT

## 2025-06-16 PROCEDURE — 85025 COMPLETE CBC W/AUTO DIFF WBC: CPT

## 2025-06-16 RX ORDER — ACETAMINOPHEN 500 MG
1000 TABLET ORAL EVERY 8 HOURS
Qty: 60 TABLET | Refills: 1 | Status: SHIPPED | OUTPATIENT
Start: 2025-06-16

## 2025-06-16 RX ORDER — METHOCARBAMOL 750 MG/1
750 TABLET, FILM COATED ORAL EVERY 12 HOURS PRN
Qty: 30 TABLET | Refills: 1 | Status: SHIPPED | OUTPATIENT
Start: 2025-06-16

## 2025-06-16 RX ORDER — CELECOXIB 200 MG/1
200 CAPSULE ORAL 2 TIMES DAILY PRN
Qty: 60 CAPSULE | Refills: 1 | Status: SHIPPED | OUTPATIENT
Start: 2025-06-16

## 2025-06-16 NOTE — PROGRESS NOTES
Name: Neda Anna      : 1970      MRN: [id number]  Encounter Provider: Abran Crocker MD  Encounter Date: 2025   Encounter department: [facility]  :  Assessment & Plan  Thyroid nodule  History of thyroid nodule  Patient's biological mom has a history of thyroid cancer  Recheck thyroid ultrasound  Orders:    US thyroid; Future    TSH, 3rd generation; Future    Bipolar disorder, current episode mixed, mild (HCC)  Stable  Continue Latuda and Paxil  Provided resources for outpatient mental therapy          Chronic bilateral low back pain without sciatica  Chronic low back pain  No improvement with physical therapy and oral analgesia  Pain is negatively impacting quality of life and ability to complete ADL  Referral to pain management at [facility] per patient request  Orders:    Ambulatory referral to Spine & Pain Management; Future    celecoxib (CeleBREX) 200 mg capsule; Take 1 capsule (200 mg total) by mouth 2 (two) times a day as needed for moderate pain Would like a refill    acetaminophen (TYLENOL) 500 mg tablet; Take 2 tablets (1,000 mg total) by mouth every 8 (eight) hours    methocarbamol (ROBAXIN) 750 mg tablet; Take 1 tablet (750 mg total) by mouth every 12 (twelve) hours as needed for muscle spasms    Prediabetes  Recommend low carbohydrate diet  Recheck hemoglobin A1c            Chronic pain syndrome  Chronic low back pain from degenerative disc disease  Chronic bilateral knee pain from arthritis       Lumbar spondylosis    Orders:    celecoxib (CeleBREX) 200 mg capsule; Take 1 capsule (200 mg total) by mouth 2 (two) times a day as needed for moderate pain Would like a refill    acetaminophen (TYLENOL) 500 mg tablet; Take 2 tablets (1,000 mg total) by mouth every 8 (eight) hours    methocarbamol (ROBAXIN) 750 mg tablet; Take 1 tablet (750 mg total) by mouth every 12 (twelve) hours as needed for muscle spasms    Primary osteoarthritis of both  knees  History of knee replacement surgery  Continue follow-up with orthopedic outpatient  Continue physical therapy    Orders:    celecoxib (CeleBREX) 200 mg capsule; Take 1 capsule (200 mg total) by mouth 2 (two) times a day as needed for moderate pain Would like a refill    acetaminophen (TYLENOL) 500 mg tablet; Take 2 tablets (1,000 mg total) by mouth every 8 (eight) hours    Lumbar degenerative disc disease  Chronic low back pain  No improvement with oral analgesia and physical therapy  Referral to pain management  Orders:    celecoxib (CeleBREX) 200 mg capsule; Take 1 capsule (200 mg total) by mouth 2 (two) times a day as needed for moderate pain Would like a refill    acetaminophen (TYLENOL) 500 mg tablet; Take 2 tablets (1,000 mg total) by mouth every 8 (eight) hours    methocarbamol (ROBAXIN) 750 mg tablet; Take 1 tablet (750 mg total) by mouth every 12 (twelve) hours as needed for muscle spasms    Primary hypertension  Well-controlled  Blood pressure goal less than 140/90  Continue amlodipine          Overweight (BMI 25.0-29.9)             BMI Counseling: Body mass index is 27.44 kg/m². The BMI is above normal. Nutrition recommendations include decreasing portion sizes, encouraging healthy choices of fruits and vegetables, decreasing fast food intake, consuming healthier snacks, limiting drinks that contain sugar, moderation in carbohydrate intake and reducing intake of cholesterol. Exercise recommendations include moderate physical activity 150 minutes/week. Rationale for BMI follow-up plan is due to patient being overweight or obese.       History of Present Illness   55-year-old female with a history of hypertension, chronic low back pain with lumbar degenerative spine disease, bilateral knee arthritis, and prediabetes who presents today for follow-up.  Patient reports chronic low back pain.  She reports that her pain is often severe and affects her ability to perform ADLs.  She is requiring assistance  "of others to help her with her ADL.  She has tried physical therapy and various oral analgesia for her back pain which has not helped much.  She is currently seen pain management specialist at [facility] [place].  She would like to transition care to [facility].  She also has bilateral knee osteoarthritis.  She recently had total knee replacement surgery for her left knee.  She is following with orthopedic outpatient.      Review of Systems   Constitutional:  Negative for chills, diaphoresis, fatigue and fever.   HENT:  Negative for congestion and sinus pressure.    Eyes:  Negative for visual disturbance.   Respiratory:  Negative for cough, shortness of breath and wheezing.    Cardiovascular:  Negative for chest pain, palpitations and leg swelling.   Gastrointestinal:  Negative for abdominal pain, diarrhea, nausea and vomiting.   Genitourinary:  Negative for dysuria, frequency, genital sores, urgency and vaginal discharge.   Musculoskeletal:  Positive for arthralgias, back pain and gait problem.   Skin:  Negative for rash.   Neurological:  Negative for seizures, syncope, weakness and headaches.   Psychiatric/Behavioral:  Negative for confusion, dysphoric mood, self-injury, sleep disturbance and suicidal ideas. The patient is not hyperactive.        Objective   /80 (BP Location: Right arm, Patient Position: Sitting, Cuff Size: Standard)   Pulse 63   Temp 98 °F (36.7 °C) (Temporal)   Resp 16   Ht 5' 2\" (1.575 m)   Wt 68 kg (150 lb)   LMP 05/10/2016   SpO2 95%   BMI 27.44 kg/m²      Physical Exam  Constitutional:       General: She is not in acute distress.     Appearance: Normal appearance. She is well-developed. She is not ill-appearing, toxic-appearing or diaphoretic.   HENT:      Head: Normocephalic and atraumatic.      Right Ear: External ear normal.      Left Ear: External ear normal.      Nose: Nose normal.      Mouth/Throat:      Pharynx: No oropharyngeal exudate. "     Eyes:      General:         Right eye: No discharge.         Left eye: No discharge.      Pupils: Pupils are equal, round, and reactive to light.       Cardiovascular:      Rate and Rhythm: Normal rate and regular rhythm.      Heart sounds: Normal heart sounds. No murmur heard.     No friction rub. No gallop.   Pulmonary:      Effort: Pulmonary effort is normal. No respiratory distress.      Breath sounds: Normal breath sounds. No stridor. No wheezing or rhonchi.   Abdominal:      General: Bowel sounds are normal. There is no distension.      Palpations: Abdomen is soft. There is no mass.      Tenderness: There is no abdominal tenderness. There is no guarding.     Musculoskeletal:         General: Normal range of motion.      Cervical back: Normal range of motion.   Lymphadenopathy:      Cervical: No cervical adenopathy.     Skin:     General: Skin is warm.      Capillary Refill: Capillary refill takes less than 2 seconds.     Neurological:      General: No focal deficit present.      Mental Status: She is alert and oriented to person, place, and time.      Cranial Nerves: No cranial nerve deficit.      Motor: No weakness.      Gait: Gait normal.

## 2025-06-16 NOTE — ASSESSMENT & PLAN NOTE
Chronic low back pain from degenerative disc disease  Chronic bilateral knee pain from arthritis

## 2025-06-16 NOTE — ASSESSMENT & PLAN NOTE
History of thyroid nodule  Patient's biological mom has a history of thyroid cancer  Recheck thyroid ultrasound  Orders:    US thyroid; Future    TSH, 3rd generation; Future

## 2025-06-16 NOTE — ASSESSMENT & PLAN NOTE
Chronic low back pain  No improvement with oral analgesia and physical therapy  Referral to pain management  Orders:    celecoxib (CeleBREX) 200 mg capsule; Take 1 capsule (200 mg total) by mouth 2 (two) times a day as needed for moderate pain Would like a refill    acetaminophen (TYLENOL) 500 mg tablet; Take 2 tablets (1,000 mg total) by mouth every 8 (eight) hours    methocarbamol (ROBAXIN) 750 mg tablet; Take 1 tablet (750 mg total) by mouth every 12 (twelve) hours as needed for muscle spasms

## 2025-06-16 NOTE — ASSESSMENT & PLAN NOTE
Orders:    celecoxib (CeleBREX) 200 mg capsule; Take 1 capsule (200 mg total) by mouth 2 (two) times a day as needed for moderate pain Would like a refill    acetaminophen (TYLENOL) 500 mg tablet; Take 2 tablets (1,000 mg total) by mouth every 8 (eight) hours

## 2025-06-16 NOTE — ASSESSMENT & PLAN NOTE
History of knee replacement surgery  Continue follow-up with orthopedic outpatient  Continue physical therapy    Orders:    celecoxib (CeleBREX) 200 mg capsule; Take 1 capsule (200 mg total) by mouth 2 (two) times a day as needed for moderate pain Would like a refill    acetaminophen (TYLENOL) 500 mg tablet; Take 2 tablets (1,000 mg total) by mouth every 8 (eight) hours

## 2025-06-16 NOTE — ASSESSMENT & PLAN NOTE
Chronic low back pain  No improvement with physical therapy and oral analgesia  Pain is negatively impacting quality of life and ability to complete ADL  Referral to pain management at [facility] per patient request  Orders:    Ambulatory referral to Spine & Pain Management; Future    celecoxib (CeleBREX) 200 mg capsule; Take 1 capsule (200 mg total) by mouth 2 (two) times a day as needed for moderate pain Would like a refill    acetaminophen (TYLENOL) 500 mg tablet; Take 2 tablets (1,000 mg total) by mouth every 8 (eight) hours    methocarbamol (ROBAXIN) 750 mg tablet; Take 1 tablet (750 mg total) by mouth every 12 (twelve) hours as needed for muscle spasms

## 2025-06-18 ENCOUNTER — OFFICE VISIT (OUTPATIENT)
Dept: PHYSICAL THERAPY | Facility: MEDICAL CENTER | Age: 55
End: 2025-06-18
Attending: PHYSICIAN ASSISTANT
Payer: MEDICARE

## 2025-06-18 DIAGNOSIS — M25.562 CHRONIC PAIN OF LEFT KNEE: Primary | ICD-10-CM

## 2025-06-18 DIAGNOSIS — G89.29 CHRONIC PAIN OF LEFT KNEE: Primary | ICD-10-CM

## 2025-06-18 PROCEDURE — 97110 THERAPEUTIC EXERCISES: CPT | Performed by: PHYSICAL THERAPIST

## 2025-06-18 PROCEDURE — 97140 MANUAL THERAPY 1/> REGIONS: CPT | Performed by: PHYSICAL THERAPIST

## 2025-06-20 NOTE — PROGRESS NOTES
Daily Note     Today's date: 2025  Patient name: Neda Anna  : 1970  MRN: [id number]  Referring provider: Venessa Guzmán PA-C  Dx:   Encounter Diagnosis     ICD-10-CM    1. Chronic pain of left knee  M25.562     G89.29                      Subjective: Neda reports knee has been painful, worse with weather changes       Objective: See treatment diary below      Assessment: Tolerated treatment well. Patient fatigued well with exercises today, pain mostly located medially.   Progress as able and as pain allows  Allow soreness to guide progression       Plan: Continue per plan of care.      Precautions: None      Manuals                                                                 Neuro Re-Ed                                                                                                        Ther Ex             SLR 2+  20            SAQ 2#  20            Standing knee flexion 30            Leg press 50#  30            Standing hip abduction Red  20            Bike  5 min                                       Ther Activity                                       Gait Training                                       Modalities

## 2025-06-25 ENCOUNTER — APPOINTMENT (OUTPATIENT)
Dept: PHYSICAL THERAPY | Facility: MEDICAL CENTER | Age: 55
End: 2025-06-25
Attending: PHYSICIAN ASSISTANT
Payer: MEDICARE

## 2025-07-08 ENCOUNTER — TELEPHONE (OUTPATIENT)
Age: 55
End: 2025-07-08

## 2025-07-08 DIAGNOSIS — N95.1 MENOPAUSAL SYMPTOMS: ICD-10-CM

## 2025-07-08 RX ORDER — ESTRADIOL 0.04 MG/D
1 PATCH, EXTENDED RELEASE TRANSDERMAL 2 TIMES WEEKLY
Qty: 8 PATCH | Refills: 11 | Status: SHIPPED | OUTPATIENT
Start: 2025-07-10 | End: 2025-07-11 | Stop reason: SDUPTHER

## 2025-07-08 NOTE — TELEPHONE ENCOUNTER
Patient calling to request refill on her estradiol patches and to schedule a follow up appointment with Dr. Harvey.    Patient report she is out her estradiol patches and is currently on vacation in [place]. Pharmacy updated on chart to pharmacy local to her current location.    Attempted warm transfer to [place] for assistance in scheduling follow up due to availability.  currently unavailable. Message to clerical team to follow up with patient for scheduling.

## 2025-07-10 ENCOUNTER — NURSE TRIAGE (OUTPATIENT)
Age: 55
End: 2025-07-10

## 2025-07-10 DIAGNOSIS — N95.1 MENOPAUSAL SYMPTOMS: ICD-10-CM

## 2025-07-11 RX ORDER — ESTRADIOL 0.04 MG/D
1 PATCH, EXTENDED RELEASE TRANSDERMAL 2 TIMES WEEKLY
Qty: 8 PATCH | Refills: 11 | Status: SHIPPED | OUTPATIENT
Start: 2025-07-11

## 2025-07-11 RX ORDER — ESTRADIOL 0.04 MG/D
1 PATCH, EXTENDED RELEASE TRANSDERMAL 2 TIMES WEEKLY
Qty: 8 PATCH | Refills: 11 | Status: SHIPPED | OUTPATIENT
Start: 2025-07-14 | End: 2025-07-11 | Stop reason: SDUPTHER

## 2025-07-11 NOTE — TELEPHONE ENCOUNTER
"Reason for Disposition  • Prescription prescribed recently is not at pharmacy and triager has access to patient's EMR and prescription is recorded in the EMR    Answer Assessment - Initial Assessment Questions  1. NAME of MEDICINE: \"What medicine(s) are you calling about?\"      Estradiol Melissa Patch   2. QUESTION: \"What is your question?\" (e.g., double dose of medicine, side effect)      Sent to incorrect Pharmacy   3. PRESCRIBER: \"Who prescribed the medicine?\" Reason: if prescribed by specialist, call should be referred to that group.      Dr Shonda Platt    Protocols used: Medication Question Call-Adult-[place]    "

## 2025-07-16 ENCOUNTER — APPOINTMENT (OUTPATIENT)
Dept: PHYSICAL THERAPY | Facility: MEDICAL CENTER | Age: 55
End: 2025-07-16
Attending: PHYSICIAN ASSISTANT
Payer: COMMERCIAL

## 2025-07-18 ENCOUNTER — EVALUATION (OUTPATIENT)
Dept: PHYSICAL THERAPY | Facility: MEDICAL CENTER | Age: 55
End: 2025-07-18
Attending: PHYSICIAN ASSISTANT
Payer: COMMERCIAL

## 2025-07-18 DIAGNOSIS — M25.562 CHRONIC PAIN OF LEFT KNEE: Primary | ICD-10-CM

## 2025-07-18 DIAGNOSIS — G89.29 CHRONIC PAIN OF LEFT KNEE: Primary | ICD-10-CM

## 2025-07-18 PROCEDURE — 97140 MANUAL THERAPY 1/> REGIONS: CPT | Performed by: PHYSICAL THERAPIST

## 2025-07-18 PROCEDURE — 97110 THERAPEUTIC EXERCISES: CPT | Performed by: PHYSICAL THERAPIST

## 2025-07-18 NOTE — PROGRESS NOTES
PT Re-Evaluation     Today's date: 2025  Patient name: Neda Anna  : 1970  MRN: [id number]  Referring provider: Venessa Guzmán PA-C  Dx:   Encounter Diagnosis     ICD-10-CM    1. Chronic pain of left knee  M25.562     G89.29                      Assessment  Impairments: abnormal muscle firing, abnormal muscle tone, abnormal or restricted ROM, impaired physical strength, lacks appropriate home exercise program and pain with function    Assessment details: Neda Anna has been compliant with attending PT and home exercise program since initial eval.  Neda has shown improvements in objective impairments identified at initial evaluation which have resulted in decreased functional deficits.  Neda continues to respond favorably to treatment and will benefit from continued skilled PT services to address remaining impairments and functional limitations including long duration walking, stair climbing.  Patient will be transitioned to home exercise program as appropriate.            Understanding of Dx/Px/POC: good     Prognosis: good    Goals  Patient will successfully transition to home exercise program.  Patient will be able to manage symptoms independently.    Neda will report no limitation in walking long distances  Neda will increase strength by one muscle grade       Plan  Patient would benefit from: skilled PT  Referral necessary: No  Planned modality interventions: thermotherapy: hydrocollator packs    Planned therapy interventions: home exercise program, manual therapy, neuromuscular re-education, patient education, functional ROM exercises, strengthening, stretching, joint mobilization, graded activity, graded exercise, therapeutic exercise, body mechanics training, motor coordination training and activity modification    Frequency: 1x week  Duration in weeks: 12  Treatment plan discussed with: patient      Subjective Evaluation    History of Present Illness  Mechanism of injury:  Neda Anna is a 54 y.o. female presenting to therapy with complaints of bilateral knee pain.   She reports having L TKA in October in , underwent therapy and regained her motion however feels like her knee never regained full strength.  She recently underwent injection to pes anersine bursa on L.   She is hoping to improve her strength in knees, considering R TKA in near future.    Patient Goals  Patient goals for therapy: decreased pain, increased motion, return to sport/leisure activities, independence with ADLs/IADLs and increased strength    Pain  Current pain rating: 3  At best pain ratin  At worst pain ratin  Quality: dull ache, discomfort, pressure and tight        Objective     Observations   Left Knee   Negative for edema and effusion.     Right Knee   Negative for edema and effusion.     Active Range of Motion   Left Knee   Normal active range of motion    Right Knee   Normal active range of motion    Mobility   Patellar Mobility:   Left Knee   WFL: medial, lateral, superior and inferior.     Right Knee   WFL: medial, lateral, superior and inferior    Strength/Myotome Testing     Left Hip   Planes of Motion   Flexion: 3+  Extension: 3+  Abduction: 3+  Adduction: 3+    Right Hip   Planes of Motion   Flexion: 3+  Extension: 3+  Abduction: 3+  Adduction: 3+    Left Knee   Flexion: 3+  Prone flexion: 3+  Extension: 3+    Right Knee   Flexion: 3+  Prone flexion: 3+  Extension: 4-             Precautions: None      Manuals                                                                 Neuro Re-Ed                                                                                                        Ther Ex             SLR             SAQ             Standing knee flexion             Leg press                                                                 Ther Activity                                       Gait Training                                       Modalities

## 2025-07-23 ENCOUNTER — APPOINTMENT (OUTPATIENT)
Dept: PHYSICAL THERAPY | Facility: MEDICAL CENTER | Age: 55
End: 2025-07-23
Attending: PHYSICIAN ASSISTANT
Payer: COMMERCIAL

## 2025-07-28 ENCOUNTER — TELEPHONE (OUTPATIENT)
Dept: GASTROENTEROLOGY | Facility: MEDICAL CENTER | Age: 55
End: 2025-07-28

## 2025-07-30 ENCOUNTER — ANESTHESIA (OUTPATIENT)
Dept: ANESTHESIOLOGY | Facility: HOSPITAL | Age: 55
End: 2025-07-30

## 2025-07-30 ENCOUNTER — OFFICE VISIT (OUTPATIENT)
Dept: PHYSICAL THERAPY | Facility: MEDICAL CENTER | Age: 55
End: 2025-07-30
Attending: PHYSICIAN ASSISTANT
Payer: COMMERCIAL

## 2025-07-30 ENCOUNTER — ANESTHESIA EVENT (OUTPATIENT)
Dept: ANESTHESIOLOGY | Facility: HOSPITAL | Age: 55
End: 2025-07-30

## 2025-07-30 DIAGNOSIS — M25.562 CHRONIC PAIN OF LEFT KNEE: Primary | ICD-10-CM

## 2025-07-30 DIAGNOSIS — G89.29 CHRONIC PAIN OF LEFT KNEE: Primary | ICD-10-CM

## 2025-07-30 PROCEDURE — 97110 THERAPEUTIC EXERCISES: CPT | Performed by: PHYSICAL THERAPIST

## 2025-08-01 ENCOUNTER — TELEPHONE (OUTPATIENT)
Dept: GASTROENTEROLOGY | Facility: MEDICAL CENTER | Age: 55
End: 2025-08-01

## 2025-08-06 ENCOUNTER — OFFICE VISIT (OUTPATIENT)
Dept: PHYSICAL THERAPY | Facility: MEDICAL CENTER | Age: 55
End: 2025-08-06
Attending: PHYSICIAN ASSISTANT
Payer: COMMERCIAL

## 2025-08-06 DIAGNOSIS — G89.29 CHRONIC PAIN OF LEFT KNEE: Primary | ICD-10-CM

## 2025-08-06 DIAGNOSIS — M25.562 CHRONIC PAIN OF LEFT KNEE: Primary | ICD-10-CM

## 2025-08-06 PROCEDURE — 97110 THERAPEUTIC EXERCISES: CPT | Performed by: PHYSICAL THERAPIST

## 2025-08-15 ENCOUNTER — TELEPHONE (OUTPATIENT)
Age: 55
End: 2025-08-15

## 2025-08-16 DIAGNOSIS — F31.61 BIPOLAR DISORDER, CURRENT EPISODE MIXED, MILD (HCC): ICD-10-CM

## 2025-08-18 RX ORDER — PAROXETINE 10 MG/1
10 TABLET, FILM COATED ORAL DAILY
Qty: 30 TABLET | Refills: 1 | Status: SHIPPED | OUTPATIENT
Start: 2025-08-18

## 2025-08-18 RX ORDER — LURASIDONE HYDROCHLORIDE 40 MG/1
40 TABLET, FILM COATED ORAL
Qty: 30 TABLET | Refills: 1 | Status: SHIPPED | OUTPATIENT
Start: 2025-08-18

## (undated) DEVICE — CUFF TOURNIQUET 30 X 4 IN QUICK CONNECT DISP 1BLA

## (undated) DEVICE — SURGICAL GOWN, XL SMARTSLEEVE: Brand: CONVERTORS

## (undated) DEVICE — SUT VICRYL 1 CT-1 27 IN J261H

## (undated) DEVICE — WEBRIL 6 IN UNSTERILE

## (undated) DEVICE — GLOVE INDICATOR PI UNDERGLOVE SZ 8.5 BLUE

## (undated) DEVICE — STIRRUP STRAP ADULT DISP

## (undated) DEVICE — DRAPE EQUIPMENT RF WAND

## (undated) DEVICE — EXOFIN PRECISION PEN HIGH VISCOSITY TOPICAL SKIN ADHESIVE: Brand: EXOFIN PRECISION PEN, 1G

## (undated) DEVICE — BETHLEHEM UNIV TOTAL KNEE, KIT: Brand: CARDINAL HEALTH

## (undated) DEVICE — SYRINGE 50ML LL

## (undated) DEVICE — PLUMEPEN PRO 10FT

## (undated) DEVICE — GLOVE INDICATOR PI UNDERGLOVE SZ 6.5 BLUE

## (undated) DEVICE — SAW BLADE OSCILLATING BRAZOL 167

## (undated) DEVICE — SPONGE SCRUB 4 PCT CHLORHEXIDINE

## (undated) DEVICE — ACE WRAP 6 IN UNSTERILE

## (undated) DEVICE — HOOD: Brand: T7PLUS

## (undated) DEVICE — 3M™ IOBAN™ 2 ANTIMICROBIAL INCISE DRAPE 6650EZ: Brand: IOBAN™ 2

## (undated) DEVICE — CEMENT MIXING SYSTEM WITH FEMORAL BREAKWAY NOZZLE: Brand: REVOLUTION

## (undated) DEVICE — DRESSING MEPILEX AG BORDER POST-OP 4 X 12 IN

## (undated) DEVICE — 450 ML BOTTLE OF 0.05% CHLORHEXIDINE GLUCONATE IN 99.95% STERILE WATER FOR IRRIGATION, USP AND APPLICATOR.: Brand: IRRISEPT ANTIMICROBIAL WOUND LAVAGE

## (undated) DEVICE — SUT VICRYL 2-0 CT-1 36 IN J945H

## (undated) DEVICE — PAD CAST 4 IN COTTON NON STERILE

## (undated) DEVICE — HANDPIECE SET WITH RETRACTABLE COAXIAL FAN SPRAY TIP AND SUCTION TUBE: Brand: INTERPULSE

## (undated) DEVICE — GLOVE SRG BIOGEL ORTHOPEDIC 8.5

## (undated) DEVICE — GLOVE SRG BIOGEL 8.5

## (undated) DEVICE — 3M™ STERI-DRAPE™ U-DRAPE 1015: Brand: STERI-DRAPE™

## (undated) DEVICE — IMPERVIOUS STOCKINETTE: Brand: DEROYAL

## (undated) DEVICE — SUT STRATAFIX SPIRAL PDS PLUS 1 CTX 18 IN SXPP1A400

## (undated) DEVICE — GLOVE SRG BIOGEL 6.5

## (undated) DEVICE — SUT STRATAFIX SPIRAL PLUS 3-0 PS-2 30 X 30 CM SXMP2B408

## (undated) DEVICE — CAPIT KNEE ATTUNE FB MEDIAL STAB AOX POR

## (undated) DEVICE — SPECIMEN CONTAINER STERILE PEEL PACK

## (undated) DEVICE — NEEDLE 18 G X 1 1/2